# Patient Record
Sex: FEMALE | Race: BLACK OR AFRICAN AMERICAN | NOT HISPANIC OR LATINO
[De-identification: names, ages, dates, MRNs, and addresses within clinical notes are randomized per-mention and may not be internally consistent; named-entity substitution may affect disease eponyms.]

---

## 2017-04-05 ENCOUNTER — CHARTING TRANS (OUTPATIENT)
Dept: OTHER | Age: 67
End: 2017-04-05

## 2017-04-05 ENCOUNTER — LAB SERVICES (OUTPATIENT)
Dept: OTHER | Age: 67
End: 2017-04-05

## 2017-04-06 ENCOUNTER — CHARTING TRANS (OUTPATIENT)
Dept: OTHER | Age: 67
End: 2017-04-06

## 2017-04-06 LAB
ALBUMIN SERPL-MCNC: 3.9 G/DL (ref 3.6–5.1)
ALBUMIN/GLOB SERPL: 1.1 (ref 1–2.4)
ALP SERPL-CCNC: 128 UNITS/L (ref 45–117)
ALT SERPL-CCNC: 16 UNITS/L
ANION GAP SERPL CALC-SCNC: 8 MMOL/L (ref 10–20)
AST SERPL-CCNC: 20 UNITS/L
BASOPHILS # BLD: 0 K/MCL (ref 0–0.3)
BASOPHILS NFR BLD: 0 %
BILIRUB SERPL-MCNC: 0.4 MG/DL (ref 0.2–1)
BUN SERPL-MCNC: 14 MG/DL (ref 6–20)
BUN/CREAT SERPL: 18 (ref 7–25)
CALCIUM SERPL-MCNC: 8.9 MG/DL (ref 8.4–10.2)
CHLORIDE SERPL-SCNC: 103 MMOL/L (ref 98–107)
CHOLEST SERPL-MCNC: 144 MG/DL
CHOLEST/HDLC SERPL: 3.1
CO2 SERPL-SCNC: 35 MMOL/L (ref 21–32)
CREAT SERPL-MCNC: 0.76 MG/DL (ref 0.51–0.95)
CREATININE RANDOM URINE: 237 MG/DL
DIFFERENTIAL METHOD BLD: ABNORMAL
EOSINOPHIL # BLD: 0.2 K/MCL (ref 0.1–0.5)
EOSINOPHIL NFR BLD: 3 %
ERYTHROCYTE [DISTWIDTH] IN BLOOD: 14.1 % (ref 11–15)
GLOBULIN SER-MCNC: 3.5 G/DL (ref 2–4)
GLUCOSE SERPL-MCNC: 109 MG/DL (ref 65–99)
HBA1C MFR BLD: 9.6 % (ref 4.5–5.6)
HDLC SERPL-MCNC: 47 MG/DL
HEMATOCRIT: 44.5 % (ref 36–46.5)
HEMOGLOBIN: 14 G/DL (ref 12–15.5)
LDLC SERPL CALC-MCNC: 81 MG/DL
LENGTH OF FAST TIME PATIENT: ABNORMAL HRS
LENGTH OF FAST TIME PATIENT: ABNORMAL HRS
LYMPHOCYTES # BLD: 3.2 K/MCL (ref 1–4)
LYMPHOCYTES NFR BLD: 45 %
MEAN CORPUSCULAR HEMOGLOBIN: 28.6 PG (ref 26–34)
MEAN CORPUSCULAR HGB CONC: 31.5 G/DL (ref 32–36.5)
MEAN CORPUSCULAR VOLUME: 91 FL (ref 78–100)
MICROALBUMIN UR-MCNC: 10.8 MG/DL
MICROALBUMIN/CREAT UR: 45.6 MCG/MG
MONOCYTES # BLD: 0.5 K/MCL (ref 0.3–0.9)
MONOCYTES NFR BLD: 6 %
NEUTROPHILS # BLD: 3.2 K/MCL (ref 1.8–7.7)
NEUTROPHILS NFR BLD: 46 %
NONHDLC SERPL-MCNC: 97 MG/DL
PLATELET COUNT: 276 K/MCL (ref 140–450)
POTASSIUM SERPL-SCNC: 3.5 MMOL/L (ref 3.4–5.1)
RED CELL COUNT: 4.89 MIL/MCL (ref 4–5.2)
SODIUM SERPL-SCNC: 142 MMOL/L (ref 135–145)
TOTAL PROTEIN: 7.4 G/DL (ref 6.4–8.2)
TRIGL SERPL-MCNC: 79 MG/DL
WHITE BLOOD COUNT: 7.1 K/MCL (ref 4.2–11)

## 2017-06-22 ENCOUNTER — CHARTING TRANS (OUTPATIENT)
Dept: OTHER | Age: 67
End: 2017-06-22

## 2017-08-30 ENCOUNTER — CHARTING TRANS (OUTPATIENT)
Dept: OTHER | Age: 67
End: 2017-08-30

## 2018-02-05 ENCOUNTER — CHARTING TRANS (OUTPATIENT)
Dept: OTHER | Age: 68
End: 2018-02-05

## 2018-03-16 ENCOUNTER — HOSPITAL (OUTPATIENT)
Dept: OTHER | Age: 68
End: 2018-03-16
Attending: INTERNAL MEDICINE

## 2018-03-16 LAB — GLUCOSE BLDC GLUCOMTR-MCNC: 175 MG/DL (ref 65–99)

## 2018-08-04 ENCOUNTER — LAB SERVICES (OUTPATIENT)
Dept: OTHER | Age: 68
End: 2018-08-04

## 2018-08-06 ENCOUNTER — CHARTING TRANS (OUTPATIENT)
Dept: OTHER | Age: 68
End: 2018-08-06

## 2018-08-06 LAB
ALBUMIN SERPL-MCNC: 3.8 G/DL (ref 3.6–5.1)
ALBUMIN/GLOB SERPL: 1.1 (ref 1–2.4)
ALP SERPL-CCNC: 111 UNITS/L (ref 45–117)
ALT SERPL-CCNC: 19 UNITS/L
ANION GAP SERPL CALC-SCNC: 12 MMOL/L (ref 10–20)
AST SERPL-CCNC: 16 UNITS/L
BASOPHILS # BLD: 0 K/MCL (ref 0–0.3)
BASOPHILS NFR BLD: 1 %
BILIRUB SERPL-MCNC: 0.4 MG/DL (ref 0.2–1)
BUN SERPL-MCNC: 17 MG/DL (ref 6–20)
BUN/CREAT SERPL: 20 (ref 7–25)
CALCIUM SERPL-MCNC: 9.2 MG/DL (ref 8.4–10.2)
CHLORIDE SERPL-SCNC: 105 MMOL/L (ref 98–107)
CHOLEST SERPL-MCNC: 128 MG/DL
CHOLEST/HDLC SERPL: 2.8
CO2 SERPL-SCNC: 30 MMOL/L (ref 21–32)
CREAT SERPL-MCNC: 0.87 MG/DL (ref 0.51–0.95)
CREATININE RANDOM URINE: 371 MG/DL
DIFFERENTIAL METHOD BLD: ABNORMAL
EOSINOPHIL # BLD: 0.1 K/MCL (ref 0.1–0.5)
EOSINOPHIL NFR BLD: 2 %
ERYTHROCYTE [DISTWIDTH] IN BLOOD: 13.3 % (ref 11–15)
GLOBULIN SER-MCNC: 3.4 G/DL (ref 2–4)
GLUCOSE SERPL-MCNC: 190 MG/DL (ref 65–99)
HBA1C MFR BLD: 10.5 % (ref 4.5–5.6)
HDLC SERPL-MCNC: 45 MG/DL
HEMATOCRIT: 41.1 % (ref 36–46.5)
HEMOGLOBIN: 13.2 G/DL (ref 12–15.5)
IMM GRANULOCYTES # BLD AUTO: 0 K/MCL (ref 0–0.2)
IMM GRANULOCYTES NFR BLD: 0 %
LDLC SERPL CALC-MCNC: 72 MG/DL
LENGTH OF FAST TIME PATIENT: 0 HRS
LENGTH OF FAST TIME PATIENT: 0 HRS
LYMPHOCYTES # BLD: 2 K/MCL (ref 1–4)
LYMPHOCYTES NFR BLD: 37 %
MEAN CORPUSCULAR HEMOGLOBIN: 28.8 PG (ref 26–34)
MEAN CORPUSCULAR HGB CONC: 32.1 G/DL (ref 32–36.5)
MEAN CORPUSCULAR VOLUME: 89.7 FL (ref 78–100)
MICROALBUMIN UR-MCNC: 47.6 MG/DL
MICROALBUMIN/CREAT UR: 128.3 MG/G
MONOCYTES # BLD: 0.5 K/MCL (ref 0.3–0.9)
MONOCYTES NFR BLD: 9 %
NEUTROPHILS # BLD: 2.8 K/MCL (ref 1.8–7.7)
NEUTROPHILS NFR BLD: 51 %
NONHDLC SERPL-MCNC: 83 MG/DL
NRBC (NRBCRE): 0 /100 WBC
PLATELET COUNT: 215 K/MCL (ref 140–450)
POTASSIUM SERPL-SCNC: 4 MMOL/L (ref 3.4–5.1)
RED CELL COUNT: 4.58 MIL/MCL (ref 4–5.2)
SODIUM SERPL-SCNC: 143 MMOL/L (ref 135–145)
TOTAL PROTEIN: 7.2 G/DL (ref 6.4–8.2)
TRIGL SERPL-MCNC: 57 MG/DL
WHITE BLOOD COUNT: 5.5 K/MCL (ref 4.2–11)

## 2018-08-07 ENCOUNTER — CHARTING TRANS (OUTPATIENT)
Dept: OTHER | Age: 68
End: 2018-08-07

## 2018-10-31 VITALS — BODY MASS INDEX: 36.58 KG/M2 | RESPIRATION RATE: 18 BRPM | HEART RATE: 81 BPM | WEIGHT: 247 LBS | HEIGHT: 69 IN

## 2018-11-01 VITALS
HEIGHT: 69 IN | TEMPERATURE: 98.1 F | BODY MASS INDEX: 37.92 KG/M2 | RESPIRATION RATE: 18 BRPM | WEIGHT: 256 LBS | HEART RATE: 70 BPM

## 2018-11-03 VITALS
HEART RATE: 68 BPM | BODY MASS INDEX: 37.62 KG/M2 | WEIGHT: 254 LBS | SYSTOLIC BLOOD PRESSURE: 124 MMHG | HEIGHT: 69 IN | DIASTOLIC BLOOD PRESSURE: 68 MMHG

## 2018-11-03 VITALS
TEMPERATURE: 98.3 F | BODY MASS INDEX: 37.18 KG/M2 | HEIGHT: 69 IN | RESPIRATION RATE: 18 BRPM | WEIGHT: 251.02 LBS | HEART RATE: 62 BPM

## 2018-11-03 VITALS
WEIGHT: 250.99 LBS | HEART RATE: 56 BPM | RESPIRATION RATE: 18 BRPM | BODY MASS INDEX: 37.18 KG/M2 | HEIGHT: 69 IN | TEMPERATURE: 98.3 F

## 2018-11-23 ENCOUNTER — CHARTING TRANS (OUTPATIENT)
Dept: OTHER | Age: 68
End: 2018-11-23

## 2018-12-02 ENCOUNTER — TELEPHONE (OUTPATIENT)
Dept: SCHEDULING | Age: 68
End: 2018-12-02

## 2018-12-04 ENCOUNTER — APPOINTMENT (OUTPATIENT)
Dept: INTERNAL MEDICINE | Age: 68
End: 2018-12-04

## 2018-12-17 ENCOUNTER — OFFICE VISIT (OUTPATIENT)
Dept: INTERNAL MEDICINE | Age: 68
End: 2018-12-17

## 2018-12-17 VITALS
SYSTOLIC BLOOD PRESSURE: 144 MMHG | BODY MASS INDEX: 37.03 KG/M2 | WEIGHT: 250 LBS | DIASTOLIC BLOOD PRESSURE: 68 MMHG | HEIGHT: 69 IN | HEART RATE: 76 BPM | RESPIRATION RATE: 18 BRPM

## 2018-12-17 DIAGNOSIS — Z86.010 HX OF ADENOMATOUS COLONIC POLYPS: ICD-10-CM

## 2018-12-17 PROBLEM — E66.9 OBESITY (BMI 30-39.9): Status: ACTIVE | Noted: 2018-12-17

## 2018-12-17 PROCEDURE — 99214 OFFICE O/P EST MOD 30 MIN: CPT | Performed by: INTERNAL MEDICINE

## 2018-12-17 RX ORDER — FUROSEMIDE 40 MG/1
TABLET ORAL EVERY 12 HOURS SCHEDULED
COMMUNITY
Start: 2018-07-02 | End: 2019-11-13 | Stop reason: SDUPTHER

## 2018-12-17 RX ORDER — LISINOPRIL 40 MG/1
TABLET ORAL
COMMUNITY
Start: 2018-10-09 | End: 2019-04-23 | Stop reason: SDUPTHER

## 2018-12-17 RX ORDER — CARVEDILOL 25 MG/1
TABLET ORAL
COMMUNITY
Start: 2018-07-05 | End: 2019-11-13 | Stop reason: SDUPTHER

## 2018-12-17 RX ORDER — LANCETS
EACH MISCELLANEOUS
COMMUNITY
Start: 2018-03-29 | End: 2021-04-24

## 2018-12-17 RX ORDER — GLIMEPIRIDE 2 MG/1
TABLET ORAL EVERY 12 HOURS
COMMUNITY
Start: 2018-07-02 | End: 2021-05-04 | Stop reason: CLARIF

## 2018-12-17 RX ORDER — DOCUSATE SODIUM 100 MG/1
CAPSULE, LIQUID FILLED ORAL
COMMUNITY
Start: 2018-08-07 | End: 2019-08-07

## 2018-12-17 RX ORDER — SPIRONOLACTONE 50 MG/1
TABLET, FILM COATED ORAL DAILY
COMMUNITY
Start: 2018-10-25 | End: 2019-01-18 | Stop reason: SDUPTHER

## 2018-12-17 RX ORDER — FELODIPINE 10 MG/1
TABLET, EXTENDED RELEASE ORAL DAILY
COMMUNITY
Start: 2018-07-02 | End: 2019-11-13 | Stop reason: SDUPTHER

## 2018-12-17 RX ORDER — ROSUVASTATIN CALCIUM 40 MG/1
TABLET, COATED ORAL
COMMUNITY
Start: 2018-03-25 | End: 2020-10-31 | Stop reason: CLARIF

## 2018-12-17 SDOH — HEALTH STABILITY: MENTAL HEALTH: HOW OFTEN DO YOU HAVE A DRINK CONTAINING ALCOHOL?: NEVER

## 2018-12-17 SDOH — HEALTH STABILITY: PHYSICAL HEALTH: ON AVERAGE, HOW MANY DAYS PER WEEK DO YOU ENGAGE IN MODERATE TO STRENUOUS EXERCISE (LIKE A BRISK WALK)?: 0 DAYS

## 2018-12-17 ASSESSMENT — ENCOUNTER SYMPTOMS
ABDOMINAL PAIN: 0
POLYDIPSIA: 0
WHEEZING: 0
COUGH: 0
ACTIVITY CHANGE: 0
ADENOPATHY: 0
CONFUSION: 0
TROUBLE SWALLOWING: 0
SHORTNESS OF BREATH: 0
ROS SKIN COMMENTS: NO UNUSUAL MOLE OR SKIN LESION
CHEST TIGHTNESS: 0
ROS GI COMMENTS: NO CHANGE IN BOWEL HABITS
APNEA: 0
BRUISES/BLEEDS EASILY: 0
DIZZINESS: 0
NERVOUS/ANXIOUS: 0
RHINORRHEA: 0
CHOKING: 0
SLEEP DISTURBANCE: 0
HEADACHES: 0

## 2018-12-17 ASSESSMENT — PATIENT HEALTH QUESTIONNAIRE - PHQ9
2. FEELING DOWN, DEPRESSED OR HOPELESS: NOT AT ALL
1. LITTLE INTEREST OR PLEASURE IN DOING THINGS: NOT AT ALL
SUM OF ALL RESPONSES TO PHQ9 QUESTIONS 1 AND 2: 0

## 2019-01-01 ENCOUNTER — EXTERNAL RECORD (OUTPATIENT)
Dept: HEALTH INFORMATION MANAGEMENT | Facility: OTHER | Age: 69
End: 2019-01-01

## 2019-01-22 RX ORDER — SPIRONOLACTONE 50 MG/1
TABLET, FILM COATED ORAL
Qty: 90 TABLET | Refills: 0 | Status: SHIPPED | OUTPATIENT
Start: 2019-01-22 | End: 2019-10-04 | Stop reason: SDUPTHER

## 2019-03-18 ENCOUNTER — TELEPHONE (OUTPATIENT)
Dept: SCHEDULING | Age: 69
End: 2019-03-18

## 2019-03-18 ENCOUNTER — OFFICE VISIT (OUTPATIENT)
Dept: INTERNAL MEDICINE | Age: 69
End: 2019-03-18

## 2019-03-18 VITALS
WEIGHT: 249.7 LBS | SYSTOLIC BLOOD PRESSURE: 187 MMHG | DIASTOLIC BLOOD PRESSURE: 98 MMHG | BODY MASS INDEX: 36.87 KG/M2 | HEART RATE: 76 BPM | TEMPERATURE: 98.2 F

## 2019-03-18 DIAGNOSIS — G47.33 OBSTRUCTIVE SLEEP APNEA: ICD-10-CM

## 2019-03-18 DIAGNOSIS — E78.5 DYSLIPIDEMIA: ICD-10-CM

## 2019-03-18 DIAGNOSIS — I10 BENIGN ESSENTIAL HYPERTENSION: ICD-10-CM

## 2019-03-18 DIAGNOSIS — F43.24 ADJUSTMENT DISORDER WITH DISTURBANCE OF CONDUCT: Primary | ICD-10-CM

## 2019-03-18 DIAGNOSIS — K92.1 HEMATOCHEZIA: ICD-10-CM

## 2019-03-18 DIAGNOSIS — E66.09 EXOGENOUS OBESITY: ICD-10-CM

## 2019-03-18 PROCEDURE — 99214 OFFICE O/P EST MOD 30 MIN: CPT | Performed by: INTERNAL MEDICINE

## 2019-03-18 SDOH — HEALTH STABILITY: MENTAL HEALTH: HOW OFTEN DO YOU HAVE A DRINK CONTAINING ALCOHOL?: NEVER

## 2019-03-18 ASSESSMENT — PATIENT HEALTH QUESTIONNAIRE - PHQ9
SUM OF ALL RESPONSES TO PHQ9 QUESTIONS 1 AND 2: 0
2. FEELING DOWN, DEPRESSED OR HOPELESS: NOT AT ALL
SUM OF ALL RESPONSES TO PHQ9 QUESTIONS 1 AND 2: 0
1. LITTLE INTEREST OR PLEASURE IN DOING THINGS: NOT AT ALL

## 2019-03-18 ASSESSMENT — ENCOUNTER SYMPTOMS
NEUROLOGICAL NEGATIVE: 1
HEMATOLOGIC/LYMPHATIC NEGATIVE: 1

## 2019-03-25 ENCOUNTER — APPOINTMENT (OUTPATIENT)
Dept: INTERNAL MEDICINE | Age: 69
End: 2019-03-25

## 2019-04-05 ENCOUNTER — LAB SERVICES (OUTPATIENT)
Dept: LAB | Age: 69
End: 2019-04-05

## 2019-04-05 LAB
ALBUMIN SERPL-MCNC: 4 G/DL (ref 3.6–5.1)
ALBUMIN/GLOB SERPL: 1.1 {RATIO} (ref 1–2.4)
ALP SERPL-CCNC: 127 UNITS/L (ref 45–117)
ALT SERPL-CCNC: 21 UNITS/L
ANION GAP SERPL CALC-SCNC: 10 MMOL/L (ref 10–20)
AST SERPL-CCNC: 15 UNITS/L
BASOPHILS # BLD AUTO: 0.1 K/MCL (ref 0–0.3)
BASOPHILS NFR BLD AUTO: 1 %
BILIRUB SERPL-MCNC: 0.4 MG/DL (ref 0.2–1)
BUN SERPL-MCNC: 18 MG/DL (ref 6–20)
BUN/CREAT SERPL: 19 (ref 7–25)
CALCIUM SERPL-MCNC: 9 MG/DL (ref 8.4–10.2)
CHLORIDE SERPL-SCNC: 103 MMOL/L (ref 98–107)
CHOLEST SERPL-MCNC: 137 MG/DL
CHOLEST/HDLC SERPL: 2.9 {RATIO}
CO2 SERPL-SCNC: 32 MMOL/L (ref 21–32)
CREAT SERPL-MCNC: 0.93 MG/DL (ref 0.51–0.95)
DIFFERENTIAL METHOD BLD: ABNORMAL
EOSINOPHIL # BLD AUTO: 0.2 K/MCL (ref 0.1–0.5)
EOSINOPHIL NFR SPEC: 3 %
ERYTHROCYTE [DISTWIDTH] IN BLOOD: 13.3 % (ref 11–15)
FASTING STATUS PATIENT QL REPORTED: ABNORMAL HRS
GLOBULIN SER-MCNC: 3.7 G/DL (ref 2–4)
GLUCOSE SERPL-MCNC: 178 MG/DL (ref 65–99)
HCT VFR BLD CALC: 44.1 % (ref 36–46.5)
HDLC SERPL-MCNC: 47 MG/DL
HGB BLD-MCNC: 13.9 G/DL (ref 12–15.5)
IMM GRANULOCYTES # BLD AUTO: 0 K/MCL (ref 0–0.2)
IMM GRANULOCYTES NFR BLD: 0 %
LDLC SERPL-MCNC: 75 MG/DL
LENGTH OF FAST TIME PATIENT: ABNORMAL HRS
LYMPHOCYTES # BLD MANUAL: 2.2 K/MCL (ref 1–4)
LYMPHOCYTES NFR BLD MANUAL: 33 %
MCH RBC QN AUTO: 28.7 PG (ref 26–34)
MCHC RBC AUTO-ENTMCNC: 31.5 G/DL (ref 32–36.5)
MCV RBC AUTO: 91.1 FL (ref 78–100)
MONOCYTES # BLD MANUAL: 0.4 K/MCL (ref 0.3–0.9)
MONOCYTES NFR BLD MANUAL: 6 %
NEUTROPHILS # BLD: 3.9 K/MCL (ref 1.8–7.7)
NEUTROPHILS NFR BLD AUTO: 57 %
NONHDLC SERPL-MCNC: 90 MG/DL
NRBC BLD MANUAL-RTO: 0 /100 WBC
PLATELET # BLD: 262 K/MCL (ref 140–450)
POTASSIUM SERPL-SCNC: 3.9 MMOL/L (ref 3.4–5.1)
PROT SERPL-MCNC: 7.7 G/DL (ref 6.4–8.2)
RBC # BLD: 4.84 MIL/MCL (ref 4–5.2)
SODIUM SERPL-SCNC: 141 MMOL/L (ref 135–145)
TRIGL SERPL-MCNC: 75 MG/DL
WBC # BLD: 6.8 K/MCL (ref 4.2–11)

## 2019-04-05 PROCEDURE — 80053 COMPREHEN METABOLIC PANEL: CPT | Performed by: INTERNAL MEDICINE

## 2019-04-05 PROCEDURE — 82043 UR ALBUMIN QUANTITATIVE: CPT | Performed by: INTERNAL MEDICINE

## 2019-04-05 PROCEDURE — 83036 HEMOGLOBIN GLYCOSYLATED A1C: CPT | Performed by: INTERNAL MEDICINE

## 2019-04-05 PROCEDURE — 36415 COLL VENOUS BLD VENIPUNCTURE: CPT

## 2019-04-05 PROCEDURE — 85025 COMPLETE CBC W/AUTO DIFF WBC: CPT | Performed by: INTERNAL MEDICINE

## 2019-04-05 PROCEDURE — 80061 LIPID PANEL: CPT | Performed by: INTERNAL MEDICINE

## 2019-04-06 LAB
CREAT UR-MCNC: 398 MG/DL
HBA1C MFR BLD: 10 % (ref 4.5–5.6)
MICROALBUMIN UR-MCNC: 45.8 MG/DL
MICROALBUMIN/CREAT UR: 115.1 MG/G

## 2019-04-08 ENCOUNTER — OFFICE VISIT (OUTPATIENT)
Dept: INTERNAL MEDICINE | Age: 69
End: 2019-04-08

## 2019-04-08 VITALS
RESPIRATION RATE: 18 BRPM | SYSTOLIC BLOOD PRESSURE: 124 MMHG | HEIGHT: 69 IN | DIASTOLIC BLOOD PRESSURE: 79 MMHG | BODY MASS INDEX: 35.83 KG/M2 | WEIGHT: 241.9 LBS | HEART RATE: 79 BPM

## 2019-04-08 PROCEDURE — 99213 OFFICE O/P EST LOW 20 MIN: CPT | Performed by: INTERNAL MEDICINE

## 2019-04-12 ENCOUNTER — TELEPHONE (OUTPATIENT)
Dept: SCHEDULING | Age: 69
End: 2019-04-12

## 2019-04-12 RX ORDER — ROSUVASTATIN CALCIUM 40 MG/1
40 TABLET, COATED ORAL DAILY
Qty: 30 TABLET | Refills: 3 | Status: SHIPPED | OUTPATIENT
Start: 2019-04-12 | End: 2020-08-03

## 2019-04-12 RX ORDER — ROSUVASTATIN CALCIUM 40 MG/1
40 TABLET, COATED ORAL DAILY
COMMUNITY
End: 2019-04-12 | Stop reason: SDUPTHER

## 2019-04-19 ENCOUNTER — HOSPITAL (OUTPATIENT)
Dept: OTHER | Age: 69
End: 2019-04-19

## 2019-04-19 ENCOUNTER — HOSPITAL (OUTPATIENT)
Dept: OTHER | Age: 69
End: 2019-04-19
Attending: INTERNAL MEDICINE

## 2019-04-19 LAB
GLUCOSE BLDC GLUCOMTR-MCNC: 158 MG/DL (ref 65–99)
GLUCOSE BLDC GLUCOMTR-MCNC: 158 MG/DL (ref 65–99)

## 2019-04-20 ENCOUNTER — TELEPHONE (OUTPATIENT)
Dept: SCHEDULING | Age: 69
End: 2019-04-20

## 2019-04-22 ENCOUNTER — TELEPHONE (OUTPATIENT)
Dept: SCHEDULING | Age: 69
End: 2019-04-22

## 2019-04-23 ENCOUNTER — TELEPHONE (OUTPATIENT)
Dept: SCHEDULING | Age: 69
End: 2019-04-23

## 2019-04-23 LAB — PATHOLOGIST NAME: NORMAL

## 2019-04-23 RX ORDER — LISINOPRIL 40 MG/1
40 TABLET ORAL DAILY
Qty: 90 TABLET | Refills: 1 | Status: SHIPPED | OUTPATIENT
Start: 2019-04-23 | End: 2020-10-31 | Stop reason: SDUPTHER

## 2019-04-23 RX ORDER — LISINOPRIL 40 MG/1
TABLET ORAL
OUTPATIENT
Start: 2019-04-23 | End: 2020-04-22

## 2019-05-03 ENCOUNTER — EXTERNAL RECORD (OUTPATIENT)
Dept: HEALTH INFORMATION MANAGEMENT | Facility: OTHER | Age: 69
End: 2019-05-03

## 2019-05-30 ENCOUNTER — TELEPHONE (OUTPATIENT)
Dept: SCHEDULING | Age: 69
End: 2019-05-30

## 2019-06-18 ENCOUNTER — APPOINTMENT (OUTPATIENT)
Dept: INTERNAL MEDICINE | Age: 69
End: 2019-06-18

## 2019-07-18 ENCOUNTER — TELEPHONE (OUTPATIENT)
Dept: SCHEDULING | Age: 69
End: 2019-07-18

## 2019-07-24 ENCOUNTER — APPOINTMENT (OUTPATIENT)
Dept: INTERNAL MEDICINE | Age: 69
End: 2019-07-24

## 2019-08-05 ENCOUNTER — TELEPHONE (OUTPATIENT)
Dept: SCHEDULING | Age: 69
End: 2019-08-05

## 2019-08-08 RX ORDER — CARVEDILOL 25 MG/1
25 TABLET ORAL 2 TIMES DAILY WITH MEALS
Qty: 180 TABLET | Refills: 3 | Status: SHIPPED | OUTPATIENT
Start: 2019-08-08 | End: 2020-10-31 | Stop reason: SDUPTHER

## 2019-08-08 RX ORDER — CARVEDILOL 25 MG/1
25 TABLET ORAL 2 TIMES DAILY WITH MEALS
Qty: 180 TABLET | Refills: 3 | Status: CANCELLED | OUTPATIENT
Start: 2019-08-08

## 2019-08-08 RX ORDER — GLIMEPIRIDE 2 MG/1
2 TABLET ORAL 2 TIMES DAILY
Qty: 180 TABLET | Refills: 3 | Status: SHIPPED | OUTPATIENT
Start: 2019-08-08 | End: 2020-11-02

## 2019-08-08 RX ORDER — FELODIPINE 10 MG/1
10 TABLET, EXTENDED RELEASE ORAL DAILY
Qty: 90 TABLET | Refills: 3 | Status: SHIPPED | OUTPATIENT
Start: 2019-08-08 | End: 2020-10-31 | Stop reason: SDUPTHER

## 2019-08-20 ENCOUNTER — TELEPHONE (OUTPATIENT)
Dept: SCHEDULING | Age: 69
End: 2019-08-20

## 2019-08-23 ENCOUNTER — APPOINTMENT (OUTPATIENT)
Dept: INTERNAL MEDICINE | Age: 69
End: 2019-08-23

## 2019-09-27 ENCOUNTER — TELEPHONE (OUTPATIENT)
Dept: INTERNAL MEDICINE | Age: 69
End: 2019-09-27

## 2019-10-08 RX ORDER — SPIRONOLACTONE 50 MG/1
TABLET, FILM COATED ORAL
Qty: 5 TABLET | Refills: 0 | Status: SHIPPED | OUTPATIENT
Start: 2019-10-08 | End: 2019-11-13 | Stop reason: SDUPTHER

## 2019-10-09 RX ORDER — SPIRONOLACTONE 50 MG/1
TABLET, FILM COATED ORAL
Qty: 5 TABLET | Refills: 0 | OUTPATIENT
Start: 2019-10-09

## 2019-10-16 ENCOUNTER — TELEPHONE (OUTPATIENT)
Dept: SCHEDULING | Age: 69
End: 2019-10-16

## 2019-10-17 RX ORDER — FUROSEMIDE 40 MG/1
40 TABLET ORAL 2 TIMES DAILY
Qty: 180 TABLET | Refills: 1 | Status: SHIPPED | OUTPATIENT
Start: 2019-10-17 | End: 2020-04-30 | Stop reason: SDUPTHER

## 2019-10-30 ENCOUNTER — TELEPHONE (OUTPATIENT)
Dept: SCHEDULING | Age: 69
End: 2019-10-30

## 2019-11-12 ENCOUNTER — TELEPHONE (OUTPATIENT)
Dept: SCHEDULING | Age: 69
End: 2019-11-12

## 2019-11-13 ENCOUNTER — OFFICE VISIT (OUTPATIENT)
Dept: INTERNAL MEDICINE | Age: 69
End: 2019-11-13

## 2019-11-13 ENCOUNTER — LAB SERVICES (OUTPATIENT)
Dept: LAB | Age: 69
End: 2019-11-13

## 2019-11-13 VITALS
WEIGHT: 244 LBS | HEART RATE: 72 BPM | SYSTOLIC BLOOD PRESSURE: 148 MMHG | TEMPERATURE: 97.7 F | DIASTOLIC BLOOD PRESSURE: 76 MMHG | BODY MASS INDEX: 36.03 KG/M2

## 2019-11-13 DIAGNOSIS — R07.89 OTHER CHEST PAIN: ICD-10-CM

## 2019-11-13 DIAGNOSIS — E78.5 DYSLIPIDEMIA: ICD-10-CM

## 2019-11-13 DIAGNOSIS — K21.00 ESOPHAGITIS, REFLUX: ICD-10-CM

## 2019-11-13 DIAGNOSIS — Z11.59 NEED FOR HEPATITIS C SCREENING TEST: ICD-10-CM

## 2019-11-13 DIAGNOSIS — I10 BENIGN ESSENTIAL HYPERTENSION: ICD-10-CM

## 2019-11-13 DIAGNOSIS — G47.33 OBSTRUCTIVE SLEEP APNEA: Primary | ICD-10-CM

## 2019-11-13 LAB
ALBUMIN SERPL-MCNC: 4 G/DL (ref 3.6–5.1)
ALBUMIN/GLOB SERPL: 1.2 {RATIO} (ref 1–2.4)
ALP SERPL-CCNC: 117 UNITS/L (ref 45–117)
ALT SERPL-CCNC: 17 UNITS/L
ANION GAP SERPL CALC-SCNC: 9 MMOL/L (ref 10–20)
AST SERPL-CCNC: 13 UNITS/L
BASOPHILS # BLD AUTO: 0 K/MCL (ref 0–0.3)
BASOPHILS NFR BLD AUTO: 1 %
BILIRUB SERPL-MCNC: 0.4 MG/DL (ref 0.2–1)
BUN SERPL-MCNC: 16 MG/DL (ref 6–20)
BUN/CREAT SERPL: 23 (ref 7–25)
CALCIUM SERPL-MCNC: 9 MG/DL (ref 8.4–10.2)
CHLORIDE SERPL-SCNC: 106 MMOL/L (ref 98–107)
CHOLEST SERPL-MCNC: 172 MG/DL
CHOLEST/HDLC SERPL: 3.1 {RATIO}
CO2 SERPL-SCNC: 32 MMOL/L (ref 21–32)
CREAT SERPL-MCNC: 0.71 MG/DL (ref 0.51–0.95)
CREAT UR-MCNC: 94 MG/DL
DIFFERENTIAL METHOD BLD: ABNORMAL
EOSINOPHIL # BLD AUTO: 0.2 K/MCL (ref 0.1–0.5)
EOSINOPHIL NFR SPEC: 2 %
ERYTHROCYTE [DISTWIDTH] IN BLOOD: 13.6 % (ref 11–15)
FASTING STATUS PATIENT QL REPORTED: ABNORMAL HRS
GLOBULIN SER-MCNC: 3.4 G/DL (ref 2–4)
GLUCOSE SERPL-MCNC: 123 MG/DL (ref 65–99)
HBA1C MFR BLD: 9.8 % (ref 4.5–5.6)
HCT VFR BLD CALC: 44.8 % (ref 36–46.5)
HCV AB SER QL: NEGATIVE
HDLC SERPL-MCNC: 55 MG/DL
HGB BLD-MCNC: 14.1 G/DL (ref 12–15.5)
IMM GRANULOCYTES # BLD AUTO: 0 K/MCL (ref 0–0.2)
IMM GRANULOCYTES NFR BLD: 0 %
LDLC SERPL-MCNC: 103 MG/DL
LENGTH OF FAST TIME PATIENT: NORMAL HRS
LYMPHOCYTES # BLD MANUAL: 2.6 K/MCL (ref 1–4)
LYMPHOCYTES NFR BLD MANUAL: 39 %
MCH RBC QN AUTO: 28.4 PG (ref 26–34)
MCHC RBC AUTO-ENTMCNC: 31.5 G/DL (ref 32–36.5)
MCV RBC AUTO: 90.1 FL (ref 78–100)
MICROALBUMIN UR-MCNC: 16.7 MG/DL
MICROALBUMIN/CREAT UR: 177.7 MG/G
MONOCYTES # BLD MANUAL: 0.5 K/MCL (ref 0.3–0.9)
MONOCYTES NFR BLD MANUAL: 7 %
NEUTROPHILS # BLD: 3.4 K/MCL (ref 1.8–7.7)
NEUTROPHILS NFR BLD AUTO: 51 %
NONHDLC SERPL-MCNC: 117 MG/DL
NRBC BLD MANUAL-RTO: 0 /100 WBC
PLATELET # BLD: 241 K/MCL (ref 140–450)
POTASSIUM SERPL-SCNC: 3.7 MMOL/L (ref 3.4–5.1)
PROT SERPL-MCNC: 7.4 G/DL (ref 6.4–8.2)
RBC # BLD: 4.97 MIL/MCL (ref 4–5.2)
SODIUM SERPL-SCNC: 143 MMOL/L (ref 135–145)
TRIGL SERPL-MCNC: 72 MG/DL
WBC # BLD: 6.7 K/MCL (ref 4.2–11)

## 2019-11-13 PROCEDURE — 83036 HEMOGLOBIN GLYCOSYLATED A1C: CPT | Performed by: INTERNAL MEDICINE

## 2019-11-13 PROCEDURE — 3078F DIAST BP <80 MM HG: CPT | Performed by: INTERNAL MEDICINE

## 2019-11-13 PROCEDURE — 3077F SYST BP >= 140 MM HG: CPT | Performed by: INTERNAL MEDICINE

## 2019-11-13 PROCEDURE — 36415 COLL VENOUS BLD VENIPUNCTURE: CPT

## 2019-11-13 PROCEDURE — 85025 COMPLETE CBC W/AUTO DIFF WBC: CPT | Performed by: INTERNAL MEDICINE

## 2019-11-13 PROCEDURE — 80061 LIPID PANEL: CPT | Performed by: INTERNAL MEDICINE

## 2019-11-13 PROCEDURE — 80053 COMPREHEN METABOLIC PANEL: CPT | Performed by: INTERNAL MEDICINE

## 2019-11-13 PROCEDURE — 93000 ELECTROCARDIOGRAM COMPLETE: CPT | Performed by: INTERNAL MEDICINE

## 2019-11-13 PROCEDURE — 82043 UR ALBUMIN QUANTITATIVE: CPT | Performed by: INTERNAL MEDICINE

## 2019-11-13 PROCEDURE — 99214 OFFICE O/P EST MOD 30 MIN: CPT | Performed by: INTERNAL MEDICINE

## 2019-11-13 PROCEDURE — 86803 HEPATITIS C AB TEST: CPT | Performed by: INTERNAL MEDICINE

## 2019-11-13 RX ORDER — LANCETS
EACH MISCELLANEOUS
COMMUNITY
Start: 2019-10-04 | End: 2019-12-31 | Stop reason: SDUPTHER

## 2019-11-13 RX ORDER — SPIRONOLACTONE 50 MG/1
50 TABLET, FILM COATED ORAL DAILY
Qty: 90 TABLET | Refills: 1 | Status: SHIPPED | OUTPATIENT
Start: 2019-11-13 | End: 2020-04-30 | Stop reason: SDUPTHER

## 2019-11-13 RX ORDER — PANTOPRAZOLE SODIUM 40 MG/1
40 TABLET, DELAYED RELEASE ORAL DAILY
Qty: 30 TABLET | Refills: 1 | Status: SHIPPED | OUTPATIENT
Start: 2019-11-13 | End: 2021-05-04 | Stop reason: CLARIF

## 2019-11-13 SDOH — HEALTH STABILITY: MENTAL HEALTH: HOW OFTEN DO YOU HAVE A DRINK CONTAINING ALCOHOL?: NEVER

## 2019-11-13 ASSESSMENT — COGNITIVE AND FUNCTIONAL STATUS - GENERAL
DO YOU HAVE SERIOUS DIFFICULTY WALKING OR CLIMBING STAIRS: YES
BECAUSE OF A PHYSICAL, MENTAL, OR EMOTIONAL CONDITION, DO YOU HAVE DIFFICULTY DOING ERRANDS ALONE: NO
BECAUSE OF A PHYSICAL, MENTAL, OR EMOTIONAL CONDITION, DO YOU HAVE SERIOUS DIFFICULTY CONCENTRATING, REMEMBERING OR MAKING DECISIONS: NO
DO YOU HAVE DIFFICULTY DRESSING OR BATHING: NO

## 2019-11-15 ENCOUNTER — TELEPHONE (OUTPATIENT)
Dept: SCHEDULING | Age: 69
End: 2019-11-15

## 2019-11-16 ENCOUNTER — TELEPHONE (OUTPATIENT)
Dept: SCHEDULING | Age: 69
End: 2019-11-16

## 2019-11-19 ENCOUNTER — TELEPHONE (OUTPATIENT)
Dept: SCHEDULING | Age: 69
End: 2019-11-19

## 2019-11-21 ENCOUNTER — HOSPITAL (OUTPATIENT)
Dept: OTHER | Age: 69
End: 2019-11-21
Attending: INTERNAL MEDICINE

## 2019-12-21 ENCOUNTER — TELEPHONE (OUTPATIENT)
Dept: SCHEDULING | Age: 69
End: 2019-12-21

## 2019-12-26 DIAGNOSIS — G47.33 OBSTRUCTIVE SLEEP APNEA: Primary | ICD-10-CM

## 2019-12-31 ENCOUNTER — TELEPHONE (OUTPATIENT)
Dept: SCHEDULING | Age: 69
End: 2019-12-31

## 2019-12-31 RX ORDER — LANCETS
EACH MISCELLANEOUS
Qty: 100 EACH | Refills: 0 | Status: SHIPPED | OUTPATIENT
Start: 2019-12-31 | End: 2020-04-24

## 2020-01-01 ENCOUNTER — EXTERNAL RECORD (OUTPATIENT)
Dept: HEALTH INFORMATION MANAGEMENT | Facility: OTHER | Age: 70
End: 2020-01-01

## 2020-02-12 ENCOUNTER — APPOINTMENT (OUTPATIENT)
Dept: INTERNAL MEDICINE | Age: 70
End: 2020-02-12

## 2020-03-21 ENCOUNTER — TELEPHONE (OUTPATIENT)
Dept: INTERNAL MEDICINE | Age: 70
End: 2020-03-21

## 2020-03-24 ENCOUNTER — APPOINTMENT (OUTPATIENT)
Dept: INTERNAL MEDICINE | Age: 70
End: 2020-03-24

## 2020-04-24 RX ORDER — LANCETS
EACH MISCELLANEOUS
Qty: 100 EACH | Refills: 0 | Status: SHIPPED | OUTPATIENT
Start: 2020-04-24 | End: 2020-08-03

## 2020-04-30 RX ORDER — SPIRONOLACTONE 50 MG/1
50 TABLET, FILM COATED ORAL DAILY
Qty: 90 TABLET | Refills: 0 | Status: SHIPPED | OUTPATIENT
Start: 2020-04-30 | End: 2020-10-31 | Stop reason: SDUPTHER

## 2020-04-30 RX ORDER — FUROSEMIDE 40 MG/1
40 TABLET ORAL 2 TIMES DAILY
Qty: 180 TABLET | Refills: 1 | Status: SHIPPED | OUTPATIENT
Start: 2020-04-30 | End: 2020-10-31 | Stop reason: SDUPTHER

## 2020-05-01 ENCOUNTER — TELEPHONE (OUTPATIENT)
Dept: INTERNAL MEDICINE | Age: 70
End: 2020-05-01

## 2020-05-01 ENCOUNTER — TELEPHONE (OUTPATIENT)
Dept: SCHEDULING | Age: 70
End: 2020-05-01

## 2020-05-13 ENCOUNTER — OFFICE VISIT (OUTPATIENT)
Dept: INTERNAL MEDICINE | Age: 70
End: 2020-05-13

## 2020-05-13 DIAGNOSIS — G47.33 OBSTRUCTIVE SLEEP APNEA: ICD-10-CM

## 2020-05-13 DIAGNOSIS — I47.29 VENTRICULAR TACHYCARDIA (PAROXYSMAL) (CMD): ICD-10-CM

## 2020-05-13 DIAGNOSIS — I10 BENIGN ESSENTIAL HYPERTENSION: ICD-10-CM

## 2020-05-13 PROCEDURE — 99214 OFFICE O/P EST MOD 30 MIN: CPT | Performed by: INTERNAL MEDICINE

## 2020-05-13 SDOH — HEALTH STABILITY: MENTAL HEALTH: HOW OFTEN DO YOU HAVE A DRINK CONTAINING ALCOHOL?: NEVER

## 2020-05-13 ASSESSMENT — PATIENT HEALTH QUESTIONNAIRE - PHQ9
2. FEELING DOWN, DEPRESSED OR HOPELESS: NOT AT ALL
SUM OF ALL RESPONSES TO PHQ9 QUESTIONS 1 AND 2: 0
SUM OF ALL RESPONSES TO PHQ9 QUESTIONS 1 AND 2: 0
1. LITTLE INTEREST OR PLEASURE IN DOING THINGS: NOT AT ALL

## 2020-06-04 ENCOUNTER — TELEPHONE (OUTPATIENT)
Dept: SCHEDULING | Age: 70
End: 2020-06-04

## 2020-06-04 ENCOUNTER — HOSPITAL ENCOUNTER (OUTPATIENT)
Facility: HOSPITAL | Age: 70
Setting detail: OBSERVATION
LOS: 1 days | Discharge: HOME | End: 2020-06-06
Attending: EMERGENCY MEDICINE | Admitting: HOSPITALIST
Payer: MEDICARE

## 2020-06-04 DIAGNOSIS — K92.1 GASTROINTESTINAL HEMORRHAGE WITH MELENA: Primary | ICD-10-CM

## 2020-06-04 DIAGNOSIS — R73.9 HYPERGLYCEMIA: ICD-10-CM

## 2020-06-04 DIAGNOSIS — K64.9 HEMORRHOIDS: ICD-10-CM

## 2020-06-04 DIAGNOSIS — K57.90 DIVERTICULOSIS: ICD-10-CM

## 2020-06-04 PROCEDURE — 99204 OFFICE O/P NEW MOD 45 MIN: CPT | Performed by: INTERNAL MEDICINE

## 2020-06-04 PROCEDURE — 99220 INITIAL OBSERVATION CARE,LEVL III: CPT | Performed by: HOSPITALIST

## 2020-06-04 RX ORDER — SODIUM PHOSPHATE, DIBASIC AND SODIUM PHOSPHATE, MONOBASIC 7; 19 G/133ML; G/133ML
1 ENEMA RECTAL ONCE AS NEEDED
Status: DISCONTINUED | OUTPATIENT
Start: 2020-06-04 | End: 2020-06-06

## 2020-06-04 RX ORDER — INSULIN ASPART 100 [IU]/ML
0.1 INJECTION, SOLUTION INTRAVENOUS; SUBCUTANEOUS ONCE
Status: COMPLETED | OUTPATIENT
Start: 2020-06-04 | End: 2020-06-04

## 2020-06-04 RX ORDER — GLIMEPIRIDE 2 MG/1
2 TABLET ORAL 2 TIMES DAILY WITH MEALS
COMMUNITY
End: 2021-12-21

## 2020-06-04 RX ORDER — POLYETHYLENE GLYCOL 3350 17 G/17G
17 POWDER, FOR SOLUTION ORAL DAILY PRN
Status: DISCONTINUED | OUTPATIENT
Start: 2020-06-04 | End: 2020-06-06

## 2020-06-04 RX ORDER — ROSUVASTATIN CALCIUM 40 MG/1
40 TABLET, COATED ORAL DAILY
COMMUNITY
End: 2022-02-01

## 2020-06-04 RX ORDER — FELODIPINE 10 MG/1
10 TABLET, EXTENDED RELEASE ORAL NIGHTLY
COMMUNITY
End: 2021-12-29

## 2020-06-04 RX ORDER — ACETAMINOPHEN 325 MG/1
650 TABLET ORAL EVERY 6 HOURS PRN
Status: DISCONTINUED | OUTPATIENT
Start: 2020-06-04 | End: 2020-06-06

## 2020-06-04 RX ORDER — FUROSEMIDE 40 MG/1
40 TABLET ORAL DAILY
COMMUNITY

## 2020-06-04 RX ORDER — ONDANSETRON 2 MG/ML
4 INJECTION INTRAMUSCULAR; INTRAVENOUS EVERY 6 HOURS PRN
Status: DISCONTINUED | OUTPATIENT
Start: 2020-06-04 | End: 2020-06-06

## 2020-06-04 RX ORDER — DEXTROSE MONOHYDRATE 25 G/50ML
50 INJECTION, SOLUTION INTRAVENOUS
Status: DISCONTINUED | OUTPATIENT
Start: 2020-06-04 | End: 2020-06-05

## 2020-06-04 RX ORDER — SPIRONOLACTONE 100 MG/1
50 TABLET, FILM COATED ORAL DAILY
COMMUNITY
End: 2021-10-19

## 2020-06-04 RX ORDER — SODIUM CHLORIDE 0.9 % (FLUSH) 0.9 %
3 SYRINGE (ML) INJECTION AS NEEDED
Status: DISCONTINUED | OUTPATIENT
Start: 2020-06-04 | End: 2020-06-06

## 2020-06-04 RX ORDER — LISINOPRIL 40 MG/1
40 TABLET ORAL DAILY
COMMUNITY
End: 2021-12-13

## 2020-06-04 RX ORDER — SODIUM CHLORIDE 9 MG/ML
INJECTION, SOLUTION INTRAVENOUS CONTINUOUS
Status: DISCONTINUED | OUTPATIENT
Start: 2020-06-04 | End: 2020-06-06

## 2020-06-04 RX ORDER — BISACODYL 10 MG
10 SUPPOSITORY, RECTAL RECTAL
Status: DISCONTINUED | OUTPATIENT
Start: 2020-06-04 | End: 2020-06-06

## 2020-06-04 RX ORDER — CARVEDILOL 25 MG/1
25 TABLET ORAL 2 TIMES DAILY WITH MEALS
COMMUNITY
End: 2022-01-20

## 2020-06-04 NOTE — ED INITIAL ASSESSMENT (HPI)
Pt ambulatory to ED with c/o rectal bleeding since yesterday. States she's been passing bright red clots every time she goes to the bathroom. Also c/o fatigue and abd fullness.

## 2020-06-04 NOTE — ED PROVIDER NOTES
Patient Seen in: [facility] 1w    History   Patient presents with:  GI Bleeding    Stated Complaint: rectal bleeding, abd pain    HPI  Patient complains of rectal bleeding , that began last night. Bloody stool with clots. .  Risk factors for GI bl Exam     ED Triage Vitals [06/04/20 0802]   /67   Pulse 84   Resp 20   Temp 97.5 °F (36.4 °C)   Temp src Temporal   SpO2 98 %   O2 Device None (Room air)       Current:/60   Pulse 78   Temp 98.5 °F (36.9 °C) (Oral)   Resp 19   Ht 175.3 cm (5' 9 components within normal limits   CBC W/ DIFFERENTIAL - Abnormal; Notable for the following components:    RBC 3.53 (*)     HGB 10.3 (*)     HCT 31.8 (*)     All other components within normal limits   RAPID SARS-COV-2 BY PCR - Normal   CBC WITH DIFFERENTI pressure.       Medications Prescribed:  Current Discharge Medication List                      Disposition and Plan     Clinical Impression:  Gastrointestinal hemorrhage with melena  (primary encounter diagnosis)  Hyperglycemia    Disposition:  Admit    Fo

## 2020-06-04 NOTE — H&P
[address] [facility] Patient Status:  Inpatient    1950 MRN [id number]   Location [facility] 1W Attending Dianna Lozano MD   Hosp Day # 0 PCP MERRY LAW     Date:  2020  Date of Admission Drug use: Never    Allergies/Medications: Allergies: No Known Allergies  carvedilol 25 MG Oral Tab, Take 25 mg by mouth 2 (two) times daily with meals. Felodipine ER 10 MG Oral Tablet 24 Hr, Take 10 mg by mouth daily.   furosemide 40 MG Oral Tab, Take 40 enlarged, symmetric, no tenderness/mass/nodules  Back: symmetric, no curvature. ROM normal. No CVA tenderness.   Pulmonary:  clear to auscultation bilaterally  Cardiovascular: S1, S2 normal, no murmur, click, rub or gallop, regular rate and rhythm  Abdomina midnight's based on the clinical documentation in H+P. Based on patients current state of illness, I anticipate that, after discharge, patient will require TBD.

## 2020-06-04 NOTE — CONSULTS
Dayan Person 98  Report of GI Consultation    Tab Jasmine Patient Status:  Inpatient    1950 MRN [id number]   Location [facility] 1W Attending Erika Solorio MD   Hosp Day # 0 YOAN TERRELL     Date of Admission:  2020 performed by Dr. Landy Maxwell MD on 8/18/2014:  · Preoperative diagnosis: Dysphagia, rectal bleeding  · Conscious sedation fentanyl 50 mcg Midazolam 10 mg given IVP  · Hiatal hernia and \"distal esophageal ring,\" biopsies taken  · Gastric antral erosions resolve spontaneously. I explained that her history of a colonoscopy examination just 1 year ago is reassuring. I offered options of conservative management, observation alone versus at least preparing for possible colonoscopy examination.   Ms. Artem Anthony (MILK OF MAGNESIA) 400 MG/5ML suspension 30 mL, 30 mL, Oral, Daily PRN  bisacodyl (DULCOLAX) rectal suppository 10 mg, 10 mg, Rectal, Daily PRN  Fleet Enema (FLEET) 7-19 GM/118ML enema 133 mL, 1 enema, Rectal, Once PRN  ondansetron HCl (ZOFRAN) injection 4 06/04/2020    CA 8.2 (L) 06/04/2020    ALB 3.1 (L) 06/04/2020    ALKPHO 105 06/04/2020    TP 6.8 06/04/2020    AST 11 (L) 06/04/2020    ALT 19 06/04/2020    BILT 0.3 06/04/2020           No results for input(s): URINE, CULTI, BLDSMR in the last 168 hours.

## 2020-06-05 ENCOUNTER — ANESTHESIA (OUTPATIENT)
Dept: ENDOSCOPY | Facility: HOSPITAL | Age: 70
End: 2020-06-05
Payer: MEDICARE

## 2020-06-05 ENCOUNTER — ANESTHESIA EVENT (OUTPATIENT)
Dept: ENDOSCOPY | Facility: HOSPITAL | Age: 70
End: 2020-06-05
Payer: MEDICARE

## 2020-06-05 PROBLEM — K92.2 GI HEMORRHAGE: Status: ACTIVE | Noted: 2020-06-05

## 2020-06-05 PROCEDURE — 0DJD8ZZ INSPECTION OF LOWER INTESTINAL TRACT, VIA NATURAL OR ARTIFICIAL OPENING ENDOSCOPIC: ICD-10-PCS | Performed by: INTERNAL MEDICINE

## 2020-06-05 PROCEDURE — 45378 DIAGNOSTIC COLONOSCOPY: CPT | Performed by: INTERNAL MEDICINE

## 2020-06-05 RX ORDER — CARVEDILOL 25 MG/1
25 TABLET ORAL 2 TIMES DAILY WITH MEALS
Status: DISCONTINUED | OUTPATIENT
Start: 2020-06-05 | End: 2020-06-06

## 2020-06-05 RX ORDER — SODIUM CHLORIDE, SODIUM LACTATE, POTASSIUM CHLORIDE, CALCIUM CHLORIDE 600; 310; 30; 20 MG/100ML; MG/100ML; MG/100ML; MG/100ML
INJECTION, SOLUTION INTRAVENOUS CONTINUOUS PRN
Status: DISCONTINUED | OUTPATIENT
Start: 2020-06-05 | End: 2020-06-05 | Stop reason: SURG

## 2020-06-05 RX ORDER — LIDOCAINE HYDROCHLORIDE 10 MG/ML
INJECTION, SOLUTION EPIDURAL; INFILTRATION; INTRACAUDAL; PERINEURAL AS NEEDED
Status: DISCONTINUED | OUTPATIENT
Start: 2020-06-05 | End: 2020-06-05 | Stop reason: SURG

## 2020-06-05 RX ORDER — SODIUM CHLORIDE, SODIUM LACTATE, POTASSIUM CHLORIDE, CALCIUM CHLORIDE 600; 310; 30; 20 MG/100ML; MG/100ML; MG/100ML; MG/100ML
INJECTION, SOLUTION INTRAVENOUS CONTINUOUS
Status: DISCONTINUED | OUTPATIENT
Start: 2020-06-05 | End: 2020-06-06

## 2020-06-05 RX ORDER — CALCIUM CARBONATE 200(500)MG
500 TABLET,CHEWABLE ORAL
Status: DISCONTINUED | OUTPATIENT
Start: 2020-06-05 | End: 2020-06-06

## 2020-06-05 RX ORDER — POTASSIUM CHLORIDE 20 MEQ/1
40 TABLET, EXTENDED RELEASE ORAL ONCE
Status: COMPLETED | OUTPATIENT
Start: 2020-06-05 | End: 2020-06-05

## 2020-06-05 RX ORDER — DEXTROSE MONOHYDRATE 25 G/50ML
50 INJECTION, SOLUTION INTRAVENOUS
Status: DISCONTINUED | OUTPATIENT
Start: 2020-06-05 | End: 2020-06-05 | Stop reason: HOSPADM

## 2020-06-05 RX ORDER — LISINOPRIL 40 MG/1
40 TABLET ORAL DAILY
Status: DISCONTINUED | OUTPATIENT
Start: 2020-06-05 | End: 2020-06-06

## 2020-06-05 RX ORDER — PANTOPRAZOLE SODIUM 40 MG/1
40 TABLET, DELAYED RELEASE ORAL
Status: DISCONTINUED | OUTPATIENT
Start: 2020-06-06 | End: 2020-06-06

## 2020-06-05 RX ORDER — ONDANSETRON 2 MG/ML
4 INJECTION INTRAMUSCULAR; INTRAVENOUS ONCE AS NEEDED
Status: DISCONTINUED | OUTPATIENT
Start: 2020-06-05 | End: 2020-06-05 | Stop reason: HOSPADM

## 2020-06-05 RX ORDER — NALOXONE HYDROCHLORIDE 0.4 MG/ML
80 INJECTION, SOLUTION INTRAMUSCULAR; INTRAVENOUS; SUBCUTANEOUS AS NEEDED
Status: DISCONTINUED | OUTPATIENT
Start: 2020-06-05 | End: 2020-06-05 | Stop reason: HOSPADM

## 2020-06-05 RX ORDER — POTASSIUM CHLORIDE 14.9 MG/ML
20 INJECTION INTRAVENOUS ONCE
Status: COMPLETED | OUTPATIENT
Start: 2020-06-05 | End: 2020-06-06

## 2020-06-05 RX ADMIN — LIDOCAINE HYDROCHLORIDE 50 MG: 10 INJECTION, SOLUTION EPIDURAL; INFILTRATION; INTRACAUDAL; PERINEURAL at 13:57:00

## 2020-06-05 RX ADMIN — SODIUM CHLORIDE, SODIUM LACTATE, POTASSIUM CHLORIDE, CALCIUM CHLORIDE: 600; 310; 30; 20 INJECTION, SOLUTION INTRAVENOUS at 14:28:00

## 2020-06-05 RX ADMIN — SODIUM CHLORIDE, SODIUM LACTATE, POTASSIUM CHLORIDE, CALCIUM CHLORIDE: 600; 310; 30; 20 INJECTION, SOLUTION INTRAVENOUS at 13:51:00

## 2020-06-05 NOTE — PLAN OF CARE
Report called to [facility]. RN,  Endorsed completion of potassium per potassium protocol upon pts return from endoscopy

## 2020-06-05 NOTE — ANESTHESIA PREPROCEDURE EVALUATION
Anesthesia PreOp Note    HPI:     Yue Cuba is a 81year old female who presents for preoperative consultation requested by: Rebecca Wesftall MD    Date of Surgery: 6/4/2020 - 6/5/2020    Procedure(s):  COLONOSCOPY  Indication: hemetochezia DO    Or  Glucose-Vitamin C (DEX-4) chewable tab 4 tablet, 4 tablet, Oral, Q15 Min PRN, Lester Basilio DO    Or  dextrose 50 % injection 50 mL, 50 mL, Intravenous, Q15 Min PRN, Lester Basilio DO    Or  glucose (DEX4) oral liquid 30 g, 30 g, Oral, Q15 Use      Smoking status: Never Smoker      Smokeless tobacco: Never Used    Substance and Sexual Activity      Alcohol use: Not Currently      Drug use: Never      Sexual activity: Not on file    Lifestyle      Physical activity:        Days per week: Not Oral   Oral   SpO2: 99%  100%    Weight:       Height:            Anesthesia Evaluation     Patient summary reviewed and Nursing notes reviewed    Airway   Mallampati: III  TM distance: >3 FB  Neck ROM: full  Dental - normal exam     Pulmonary - normal exa

## 2020-06-05 NOTE — ANESTHESIA POSTPROCEDURE EVALUATION
Patient: Waleska Vargas    Procedure Summary     Date:  06/05/20 Room / Location:  [facility] ENDOSCOPY 04 / [facility] ENDOSCOPY    Anesthesia Start:  5415 Anesthesia Stop:  9696    Procedure:  COLONOSCOPY (N/A ) Diagnosis:       Hematochezia      (diverticulosis, he

## 2020-06-05 NOTE — RESPIRATORY THERAPY NOTE
-Patient wears CPAP at home, and requested to use one during her hospital stay    -Pt.  Will be placed on AUTO 20/5 ( home settings unknown)

## 2020-06-05 NOTE — OPERATIVE REPORT
COLONOSCOPY PROCEDURE REPORT     DATE OF PROCEDURE:  6/5/2020     PCP: Georgie TERRELL     PREOPERATIVE DIAGNOSIS:  Hematochezia, acute blood loss anemia     POSTOPERATIVE DIAGNOSIS:  See impression. SURGEON:  BASILIA Hamilton

## 2020-06-05 NOTE — CM/SW NOTE
Patient failed inpatient criteria. Second level of review completed and supports observation. UR committee in agreement. Discussed with Dr. Philip Zambrano  who approves observation status. MOON given to the patient and order written.     Read notice to pt - copy on

## 2020-06-05 NOTE — DISCHARGE SUMMARY
[facility]    Discharge Summary     DOA 20  DOD 20    Eliane Guerrero Patient Status:  Observation    1950 MRN [id number]   Location [facility] ENDOSCOPY LAB SUITES Attending Karly Rothman, [facility] Day # 1 PCP suspension 30 mL, 30 mL, Oral, Daily PRN  bisacodyl (DULCOLAX) rectal suppository 10 mg, 10 mg, Rectal, Daily PRN  Fleet Enema (FLEET) 7-19 GM/118ML enema 133 mL, 1 enema, Rectal, Once PRN  ondansetron HCl (ZOFRAN) injection 4 mg, 4 mg, Intravenous, Q6H ID

## 2020-06-05 NOTE — PLAN OF CARE
Report given to [facility] , endoscopy  Aware that potassium continues to infuse,  Pt updated with POC, aware of transfer to endoscopy for colonoscopy at this time NPO manitained

## 2020-06-05 NOTE — PAYOR COMM NOTE
--------------  ADMISSION REVIEW     [employer] MEDICARE ADV PPO  Subscriber #:  [id number]  Authorization Number: [id number]    Admit date: 6/4/20  Admit time: 200       Admitting Physician: Miguel John MD  Attending Physician:  Maryln Denver, D • Cancer Father 58        COLON CANCER   • Hypertension Mother    • Other (Other) Mother         CIRCULATION PROBLEM   • Other (CHF) Other        Social History    Tobacco Use      Smoking status: Never Smoker      Smokeless tobacco: Never Used    Alcohol HEPATIC FUNCTION PANEL (7) - Abnormal; Notable for the following components:    AST 11 (*)     Albumin 3.1 (*)     All other components within normal limits   HEMOGLOBIN A1C - Abnormal; Notable for the following components:    HgbA1C 11.4 (*)     Estimated RAINBOW DRAW BLUE   RAINBOW DRAW LAVENDER   RAINBOW DRAW LIGHT GREEN   RAINBOW DRAW GOLD       MDM     @      Monitor Interpretation:  nsr 76    Radiology Interpretation:        Critical Care:        MDM       Admission disposition: 6/4/2020 10:06 AM HPI:   Song Sy is a(n) 81year old female. Patient is a 68-year-old female with a history of diabetes, hypertension, and hyperlipidemia who presents with bright red blood per rectum for the last 2 days.   Patient reports that yesterday she had a Rosuvastatin Calcium 40 MG Oral Tab, Take 40 mg by mouth daily. lisinopril 40 MG Oral Tab, Take 40 mg by mouth daily. metFORMIN HCl 500 MG Oral Tab, Take 1,000 mg by mouth 2 (two) times daily with meals.   spironolactone 100 MG Oral Tab, Take 50 mg by gordo Extremities: extremities normal, atraumatic, no cyanosis or edema  Pulses: 2+ and symmetric  Skin: Skin color, texture, turgor normal. No rashes or lesions  Neurologic: Alert and oriented X 3, normal strength and tone. Normal symmetric reflexes.  Normal  insulin aspart (NOVOLOG) 100 UNIT/ML vial 12 Units     Date Action Dose Route User    6/4/2020 0926 Given 12 Units Subcutaneous (Right Lower Abdomen) Herbert Griffin RN      Insulin Regular Human (NOVOLIN R) 100 UNIT/ML injection 1-5 Units     Date Action She describes abrupt onset about 24 hours ago, around 4 PM of painless hematochezia, large volumes. Though this has happened before, previous significant event 2014 this was the worst yet.   Some vague abdominal cramping, fullness but certainly no pain.    No record here of that cecal polyp removed.     Pathology report identified in Epic dated 4/19/2019 from [facility], Dr. Antwon Del Castillo.  MD Robbie including esophageal biopsies, gastric biopsies (H. pylori negative) hyperplastic 6/4/2020                  Past Medical History       Past Medical History:   Diagnosis Date   • Diabetes ([facility] [address]     • Diverticulitis     • Essential hypertension     • High blood pressure     • High cholesterol     • Hyperlipidemia     • Sleep apnea       Felodipine ER 10 MG Oral Tablet 24 Hr, Take 10 mg by mouth daily. furosemide 40 MG Oral Tab, Take 40 mg by mouth daily. glimepiride 2 MG Oral Tab, Take 2 mg by mouth 2 (two) times daily with meals.   Rosuvastatin Calcium 40 MG Oral Tab, Take 40 mg by mout Electronically signed by Venkat Stewart MD at 6/4/2020  4:39 PM       ED to Hosp-Admission (Current) on 6/4/2020          Detailed Report      Chart Review: Note Routing History     No routing history on file.      PLEASE FAX DAYS CERTIFIED AND N

## 2020-06-06 VITALS
HEART RATE: 70 BPM | WEIGHT: 239.69 LBS | SYSTOLIC BLOOD PRESSURE: 148 MMHG | DIASTOLIC BLOOD PRESSURE: 67 MMHG | RESPIRATION RATE: 20 BRPM | HEIGHT: 69 IN | TEMPERATURE: 98 F | BODY MASS INDEX: 35.5 KG/M2 | OXYGEN SATURATION: 99 %

## 2020-06-06 NOTE — PLAN OF CARE
Problem: Patient Centered Care  Goal: Patient preferences are identified and integrated in the patient's plan of care  Description  Interventions:  - What would you like us to know as we care for you?  Lives at home with daughter, she came living with me fever/infection during anticipated neutropenic period  Description  INTERVENTIONS  - Monitor WBC  - Administer growth factors as ordered  - Implement neutropenic guidelines  Outcome: Progressing     Problem: SAFETY ADULT - FALL  Goal: Free from fall injury trends  - Administer supportive blood products/factors, fluids and medications as ordered and appropriate  - Administer supportive blood products/factors as ordered and appropriate  Outcome: Progressing     A&Ox4, ambulates in room, patient independent   M

## 2020-06-06 NOTE — PLAN OF CARE
Problem: Patient Centered Care  Goal: Patient preferences are identified and integrated in the patient's plan of care  Description  Interventions:  - What would you like us to know as we care for you?  Lives at home with daughter, she came living with me pain and evaluate response  - Implement non-pharmacological measures as appropriate and evaluate response  - Consider cultural and social influences on pain and pain management  - Manage/alleviate anxiety  - Utilize distraction and/or relaxation techniques signs and symptoms of electrolyte imbalances  - Administer electrolyte replacement as ordered  - Monitor response to electrolyte replacements, including rhythm and repeat lab results as appropriate  - Fluid restriction as ordered  - Instruct patient on flu

## 2020-06-08 ENCOUNTER — TELEPHONE (OUTPATIENT)
Dept: SCHEDULING | Age: 70
End: 2020-06-08

## 2020-06-08 ENCOUNTER — TELEPHONE (OUTPATIENT)
Dept: MEDSURG UNIT | Facility: HOSPITAL | Age: 70
End: 2020-06-08

## 2020-06-08 ENCOUNTER — OFFICE VISIT (OUTPATIENT)
Dept: INTERNAL MEDICINE | Age: 70
End: 2020-06-08

## 2020-06-08 DIAGNOSIS — D50.0 ANEMIA DUE TO GI BLOOD LOSS: ICD-10-CM

## 2020-06-08 DIAGNOSIS — D64.9 ANEMIA, UNSPECIFIED TYPE: Primary | ICD-10-CM

## 2020-06-08 PROCEDURE — 99443 TELEPHONE E&M BY PHYSICIAN EST PT NOT ORIG PREV 7 DAYS 21-30 MIN: CPT | Performed by: INTERNAL MEDICINE

## 2020-06-08 RX ORDER — LISINOPRIL 40 MG/1
40 TABLET ORAL DAILY
Qty: 90 TABLET | Refills: 3 | Status: SHIPPED
Start: 2020-06-08 | End: 2020-06-22 | Stop reason: SDUPTHER

## 2020-06-08 ASSESSMENT — PATIENT HEALTH QUESTIONNAIRE - PHQ9
SUM OF ALL RESPONSES TO PHQ9 QUESTIONS 1 AND 2: 0
CLINICAL INTERPRETATION OF PHQ9 SCORE: NO FURTHER SCREENING NEEDED
CLINICAL INTERPRETATION OF PHQ2 SCORE: NO FURTHER SCREENING NEEDED
2. FEELING DOWN, DEPRESSED OR HOPELESS: NOT AT ALL
SUM OF ALL RESPONSES TO PHQ9 QUESTIONS 1 AND 2: 0
1. LITTLE INTEREST OR PLEASURE IN DOING THINGS: NOT AT ALL

## 2020-06-08 NOTE — PAYOR COMM NOTE
--------------  DISCHARGE REVIEW    Payor: [employer] MEDICARE ADV PPO  Subscriber #:  [id number]  Authorization Number: [id number]    Admit date: 6/4/20  Admit time:  1117  Discharge Date: 6/6/2020  3:03 PM     Admitting Physician: Miguel John MD  Attendin Dr. Stanton spoke with Dr. Thorpe after office visit. Dr. Thorpe would like to see patient in office ASAP. Will place stat referral.   tablet, 8 tablet, Oral, Q15 Min PRN  lactated ringers infusion, , Intravenous, Continuous  Atropine Sulfate 0.1 MG/ML injection 0.5 mg, 0.5 mg, Intravenous, PRN  Naloxone HCl (NARCAN) 0.4 MG/ML injection 80 mcg, 80 mcg, Intravenous, PRN  ondansetron HCl (Z Assessment and Plan:      Gastrointestinal hemorrhage with melena  - s/p c-scope with hemorrhoids. No bleeding noted.    - hb stable today   - apprec GI consult   - adv diet and possible dc later today        Hyperglycemia and DM - uncontrolled   - will

## 2020-06-20 ENCOUNTER — TELEPHONE (OUTPATIENT)
Dept: SCHEDULING | Age: 70
End: 2020-06-20

## 2020-06-20 ENCOUNTER — LAB SERVICES (OUTPATIENT)
Dept: LAB | Age: 70
End: 2020-06-20

## 2020-06-20 DIAGNOSIS — D64.9 ANEMIA, UNSPECIFIED TYPE: ICD-10-CM

## 2020-06-20 LAB
ALBUMIN SERPL-MCNC: 3.7 G/DL (ref 3.6–5.1)
ALBUMIN/GLOB SERPL: 1 {RATIO} (ref 1–2.4)
ALP SERPL-CCNC: 80 UNITS/L (ref 45–117)
ALT SERPL-CCNC: 14 UNITS/L
ANION GAP SERPL CALC-SCNC: 12 MMOL/L (ref 10–20)
AST SERPL-CCNC: 12 UNITS/L
BASOPHILS # BLD: 0 K/MCL (ref 0–0.3)
BASOPHILS NFR BLD: 1 %
BILIRUB SERPL-MCNC: 0.4 MG/DL (ref 0.2–1)
BUN SERPL-MCNC: 21 MG/DL (ref 6–20)
BUN/CREAT SERPL: 21 (ref 7–25)
CALCIUM SERPL-MCNC: 9.3 MG/DL (ref 8.4–10.2)
CHLORIDE SERPL-SCNC: 107 MMOL/L (ref 98–107)
CHOLEST SERPL-MCNC: 127 MG/DL
CHOLEST/HDLC SERPL: 2.7 {RATIO}
CO2 SERPL-SCNC: 29 MMOL/L (ref 21–32)
CREAT SERPL-MCNC: 1 MG/DL (ref 0.51–0.95)
DIFFERENTIAL METHOD BLD: ABNORMAL
EOSINOPHIL # BLD: 0.1 K/MCL (ref 0.1–0.5)
EOSINOPHIL NFR BLD: 1 %
ERYTHROCYTE [DISTWIDTH] IN BLOOD: 14.5 % (ref 11–15)
GLOBULIN SER-MCNC: 3.8 G/DL (ref 2–4)
GLUCOSE SERPL-MCNC: 109 MG/DL (ref 65–99)
HBA1C MFR BLD: 8.6 % (ref 4.5–5.6)
HCT VFR BLD CALC: 34.7 % (ref 36–46.5)
HDLC SERPL-MCNC: 47 MG/DL
HGB BLD-MCNC: 10.9 G/DL (ref 12–15.5)
IMM GRANULOCYTES # BLD AUTO: 0 K/MCL (ref 0–0.2)
IMM GRANULOCYTES NFR BLD: 0 %
LDLC SERPL CALC-MCNC: 67 MG/DL
LENGTH OF FAST TIME PATIENT: ABNORMAL HRS
LENGTH OF FAST TIME PATIENT: ABNORMAL HRS
LYMPHOCYTES # BLD: 2.2 K/MCL (ref 1–4)
LYMPHOCYTES NFR BLD: 35 %
MCH RBC QN AUTO: 28.7 PG (ref 26–34)
MCHC RBC AUTO-ENTMCNC: 31.4 G/DL (ref 32–36.5)
MCV RBC AUTO: 91.3 FL (ref 78–100)
MONOCYTES # BLD: 0.5 K/MCL (ref 0.3–0.9)
MONOCYTES NFR BLD: 7 %
NEUTROPHILS # BLD: 3.5 K/MCL (ref 1.8–7.7)
NEUTROPHILS NFR BLD: 56 %
NONHDLC SERPL-MCNC: 80 MG/DL
NRBC BLD MANUAL-RTO: 0 /100 WBC
PLATELET # BLD: 283 K/MCL (ref 140–450)
POTASSIUM SERPL-SCNC: 4.1 MMOL/L (ref 3.4–5.1)
PROT SERPL-MCNC: 7.5 G/DL (ref 6.4–8.2)
RBC # BLD: 3.8 MIL/MCL (ref 4–5.2)
SODIUM SERPL-SCNC: 144 MMOL/L (ref 135–145)
TRIGL SERPL-MCNC: 67 MG/DL
WBC # BLD: 6.3 K/MCL (ref 4.2–11)

## 2020-06-20 PROCEDURE — 80053 COMPREHEN METABOLIC PANEL: CPT | Performed by: INTERNAL MEDICINE

## 2020-06-20 PROCEDURE — 85025 COMPLETE CBC W/AUTO DIFF WBC: CPT | Performed by: INTERNAL MEDICINE

## 2020-06-20 PROCEDURE — 36415 COLL VENOUS BLD VENIPUNCTURE: CPT

## 2020-06-20 PROCEDURE — 83036 HEMOGLOBIN GLYCOSYLATED A1C: CPT | Performed by: INTERNAL MEDICINE

## 2020-06-20 PROCEDURE — 80061 LIPID PANEL: CPT | Performed by: INTERNAL MEDICINE

## 2020-06-22 DIAGNOSIS — I10 BENIGN ESSENTIAL HYPERTENSION: Primary | ICD-10-CM

## 2020-06-22 RX ORDER — LISINOPRIL 40 MG/1
TABLET ORAL
Qty: 90 TABLET | Refills: 3 | Status: SHIPPED
Start: 2020-06-22 | End: 2020-08-03

## 2020-07-07 ENCOUNTER — TELEPHONE (OUTPATIENT)
Dept: SCHEDULING | Age: 70
End: 2020-07-07

## 2020-07-17 ENCOUNTER — NURSE TRIAGE (OUTPATIENT)
Dept: TELEHEALTH | Age: 70
End: 2020-07-17

## 2020-07-17 ENCOUNTER — V-VISIT (OUTPATIENT)
Dept: INTERNAL MEDICINE | Age: 70
End: 2020-07-17

## 2020-07-17 ENCOUNTER — TELEPHONE (OUTPATIENT)
Dept: SCHEDULING | Age: 70
End: 2020-07-17

## 2020-07-17 ENCOUNTER — LAB SERVICES (OUTPATIENT)
Dept: LAB | Age: 70
End: 2020-07-17

## 2020-07-17 VITALS
TEMPERATURE: 95.7 F | WEIGHT: 220.46 LBS | HEART RATE: 64 BPM | RESPIRATION RATE: 16 BRPM | BODY MASS INDEX: 32.65 KG/M2 | DIASTOLIC BLOOD PRESSURE: 65 MMHG | SYSTOLIC BLOOD PRESSURE: 101 MMHG | HEIGHT: 69 IN

## 2020-07-17 DIAGNOSIS — E78.5 DYSLIPIDEMIA: ICD-10-CM

## 2020-07-17 DIAGNOSIS — I10 BENIGN ESSENTIAL HYPERTENSION: ICD-10-CM

## 2020-07-17 DIAGNOSIS — R53.83 FATIGUE, UNSPECIFIED TYPE: Primary | ICD-10-CM

## 2020-07-17 DIAGNOSIS — D50.0 ANEMIA DUE TO GI BLOOD LOSS: ICD-10-CM

## 2020-07-17 DIAGNOSIS — R53.83 FATIGUE, UNSPECIFIED TYPE: ICD-10-CM

## 2020-07-17 PROBLEM — R07.89 OTHER CHEST PAIN: Status: RESOLVED | Noted: 2019-11-13 | Resolved: 2020-07-17

## 2020-07-17 LAB
FASTING STATUS PATIENT QL REPORTED: NO
GLUCOSE SERPL-MCNC: 93 MG/DL (ref 65–99)

## 2020-07-17 PROCEDURE — 80050 GENERAL HEALTH PANEL: CPT | Performed by: INTERNAL MEDICINE

## 2020-07-17 PROCEDURE — 83540 ASSAY OF IRON: CPT | Performed by: INTERNAL MEDICINE

## 2020-07-17 PROCEDURE — 36415 COLL VENOUS BLD VENIPUNCTURE: CPT

## 2020-07-17 PROCEDURE — 3074F SYST BP LT 130 MM HG: CPT | Performed by: INTERNAL MEDICINE

## 2020-07-17 PROCEDURE — 83735 ASSAY OF MAGNESIUM: CPT | Performed by: INTERNAL MEDICINE

## 2020-07-17 PROCEDURE — 3078F DIAST BP <80 MM HG: CPT | Performed by: INTERNAL MEDICINE

## 2020-07-17 PROCEDURE — 82306 VITAMIN D 25 HYDROXY: CPT | Performed by: INTERNAL MEDICINE

## 2020-07-17 PROCEDURE — 99214 OFFICE O/P EST MOD 30 MIN: CPT | Performed by: INTERNAL MEDICINE

## 2020-07-17 PROCEDURE — 82962 GLUCOSE BLOOD TEST: CPT | Performed by: INTERNAL MEDICINE

## 2020-07-17 PROCEDURE — 82607 VITAMIN B-12: CPT | Performed by: INTERNAL MEDICINE

## 2020-07-17 PROCEDURE — 83550 IRON BINDING TEST: CPT | Performed by: INTERNAL MEDICINE

## 2020-07-17 SDOH — HEALTH STABILITY: MENTAL HEALTH: HOW OFTEN DO YOU HAVE A DRINK CONTAINING ALCOHOL?: NEVER

## 2020-07-17 SDOH — SOCIAL STABILITY: SOCIAL NETWORK: ARE YOU MARRIED, WIDOWED, DIVORCED, SEPARATED, NEVER MARRIED, OR LIVING WITH A PARTNER?: DIVORCED

## 2020-07-17 SDOH — HEALTH STABILITY: PHYSICAL HEALTH: ON AVERAGE, HOW MANY DAYS PER WEEK DO YOU ENGAGE IN MODERATE TO STRENUOUS EXERCISE (LIKE A BRISK WALK)?: 0 DAYS

## 2020-07-17 ASSESSMENT — ENCOUNTER SYMPTOMS
DIZZINESS: 0
ENDOCRINE NEGATIVE: 1
PSYCHIATRIC NEGATIVE: 1
CHILLS: 0
HEADACHES: 0
UNEXPECTED WEIGHT CHANGE: 1
FEVER: 0
RESPIRATORY NEGATIVE: 1
GASTROINTESTINAL NEGATIVE: 1

## 2020-07-17 ASSESSMENT — PATIENT HEALTH QUESTIONNAIRE - PHQ9
4. FEELING TIRED OR HAVING LITTLE ENERGY: NEARLY EVERY DAY
2. FEELING DOWN, DEPRESSED OR HOPELESS: NOT AT ALL
SUM OF ALL RESPONSES TO PHQ9 QUESTIONS 1 AND 2: 3
6. FEELING BAD ABOUT YOURSELF - OR THAT YOU ARE A FAILURE OR HAVE LET YOURSELF OR YOUR FAMILY DOWN: NEARLY EVERY DAY
9. THOUGHTS THAT YOU WOULD BE BETTER OFF DEAD, OR OF HURTING YOURSELF: NOT AT ALL
5. POOR APPETITE, WEIGHT LOSS, OR OVEREATING: NEARLY EVERY DAY
CLINICAL INTERPRETATION OF PHQ9 SCORE: FURTHER SCREENING NEEDED
CLINICAL INTERPRETATION OF PHQ9 SCORE: MODERATELY SEVERE DEPRESSION
SUM OF ALL RESPONSES TO PHQ QUESTIONS 1-9: 16
8. MOVING OR SPEAKING SO SLOWLY THAT OTHER PEOPLE COULD HAVE NOTICED. OR THE OPPOSITE, BEING SO FIGETY OR RESTLESS THAT YOU HAVE BEEN MOVING AROUND A LOT MORE THAN USUAL: SEVERAL DAYS
CLINICAL INTERPRETATION OF PHQ2 SCORE: MODERATELY SEVERE DEPRESSION
10. IF YOU CHECKED OFF ANY PROBLEMS, HOW DIFFICULT HAVE THESE PROBLEMS MADE IT FOR YOU TO DO YOUR WORK, TAKE CARE OF THINGS AT HOME, OR GET ALONG WITH OTHER PEOPLE: SOMEWHAT DIFFICULT
7. TROUBLE CONCENTRATING ON THINGS, SUCH AS READING THE NEWSPAPER OR WATCHING TELEVISION: NOT AT ALL
CLINICAL INTERPRETATION OF PHQ2 SCORE: FURTHER SCREENING NEEDED
SUM OF ALL RESPONSES TO PHQ9 QUESTIONS 1 TO 9: 16
3. TROUBLE FALLING OR STAYING ASLEEP OR SLEEPING TOO MUCH: NEARLY EVERY DAY
SUM OF ALL RESPONSES TO PHQ9 QUESTIONS 1 AND 2: 3
1. LITTLE INTEREST OR PLEASURE IN DOING THINGS: NEARLY EVERY DAY

## 2020-07-17 ASSESSMENT — COGNITIVE AND FUNCTIONAL STATUS - GENERAL
BECAUSE OF A PHYSICAL, MENTAL, OR EMOTIONAL CONDITION, DO YOU HAVE SERIOUS DIFFICULTY CONCENTRATING, REMEMBERING OR MAKING DECISIONS: NO
DO YOU HAVE SERIOUS DIFFICULTY WALKING OR CLIMBING STAIRS: YES
DO YOU HAVE DIFFICULTY DRESSING OR BATHING: NO
DO YOU HAVE SERIOUS DIFFICULTY WALKING OR CLIMBING STAIRS: NO
BECAUSE OF A PHYSICAL, MENTAL, OR EMOTIONAL CONDITION, DO YOU HAVE DIFFICULTY DOING ERRANDS ALONE: NO
DO YOU HAVE DIFFICULTY DRESSING OR BATHING: NO
BECAUSE OF A PHYSICAL, MENTAL, OR EMOTIONAL CONDITION, DO YOU HAVE DIFFICULTY DOING ERRANDS ALONE: YES
BECAUSE OF A PHYSICAL, MENTAL, OR EMOTIONAL CONDITION, DO YOU HAVE SERIOUS DIFFICULTY CONCENTRATING, REMEMBERING OR MAKING DECISIONS: NO

## 2020-07-18 LAB
25(OH)D3+25(OH)D2 SERPL-MCNC: 19.1 NG/ML (ref 30–100)
ALBUMIN SERPL-MCNC: 4 G/DL (ref 3.6–5.1)
ALBUMIN/GLOB SERPL: 1.1 {RATIO} (ref 1–2.4)
ALP SERPL-CCNC: 77 UNITS/L (ref 45–117)
ALT SERPL-CCNC: 13 UNITS/L
ANION GAP SERPL CALC-SCNC: 13 MMOL/L (ref 10–20)
AST SERPL-CCNC: 18 UNITS/L
BASOPHILS # BLD: 0.1 K/MCL (ref 0–0.3)
BASOPHILS NFR BLD: 1 %
BILIRUB SERPL-MCNC: 0.5 MG/DL (ref 0.2–1)
BUN SERPL-MCNC: 25 MG/DL (ref 6–20)
BUN/CREAT SERPL: 12 (ref 7–25)
CALCIUM SERPL-MCNC: 9.3 MG/DL (ref 8.4–10.2)
CHLORIDE SERPL-SCNC: 105 MMOL/L (ref 98–107)
CO2 SERPL-SCNC: 29 MMOL/L (ref 21–32)
CREAT SERPL-MCNC: 2.06 MG/DL (ref 0.51–0.95)
DIFFERENTIAL METHOD BLD: ABNORMAL
EOSINOPHIL # BLD: 0.1 K/MCL (ref 0.1–0.5)
EOSINOPHIL NFR BLD: 2 %
ERYTHROCYTE [DISTWIDTH] IN BLOOD: 13.8 % (ref 11–15)
GLOBULIN SER-MCNC: 3.8 G/DL (ref 2–4)
GLUCOSE SERPL-MCNC: 92 MG/DL (ref 65–99)
HCT VFR BLD CALC: 39.4 % (ref 36–46.5)
HGB BLD-MCNC: 12.3 G/DL (ref 12–15.5)
IMM GRANULOCYTES # BLD AUTO: 0 K/MCL (ref 0–0.2)
IMM GRANULOCYTES NFR BLD: 0 %
IRON SATN MFR SERPL: 15 % (ref 15–45)
IRON SERPL-MCNC: 52 MCG/DL (ref 50–170)
LENGTH OF FAST TIME PATIENT: ABNORMAL HRS
LYMPHOCYTES # BLD: 2.9 K/MCL (ref 1–4)
LYMPHOCYTES NFR BLD: 34 %
MAGNESIUM SERPL-MCNC: 1.9 MG/DL (ref 1.7–2.4)
MCH RBC QN AUTO: 28.3 PG (ref 26–34)
MCHC RBC AUTO-ENTMCNC: 31.2 G/DL (ref 32–36.5)
MCV RBC AUTO: 90.6 FL (ref 78–100)
MONOCYTES # BLD: 0.5 K/MCL (ref 0.3–0.9)
MONOCYTES NFR BLD: 6 %
NEUTROPHILS # BLD: 4.8 K/MCL (ref 1.8–7.7)
NEUTROPHILS NFR BLD: 57 %
NRBC BLD MANUAL-RTO: 0 /100 WBC
PLATELET # BLD: 265 K/MCL (ref 140–450)
POTASSIUM SERPL-SCNC: 3.2 MMOL/L (ref 3.4–5.1)
PROT SERPL-MCNC: 7.8 G/DL (ref 6.4–8.2)
RBC # BLD: 4.35 MIL/MCL (ref 4–5.2)
SODIUM SERPL-SCNC: 144 MMOL/L (ref 135–145)
TIBC SERPL-MCNC: 358 MCG/DL (ref 250–450)
TSH SERPL-ACNC: 1.14 MCUNITS/ML (ref 0.35–5)
VIT B12 SERPL-MCNC: 527 PG/ML (ref 211–911)
WBC # BLD: 8.4 K/MCL (ref 4.2–11)

## 2020-07-20 ENCOUNTER — E-ADVICE (OUTPATIENT)
Dept: INTERNAL MEDICINE | Age: 70
End: 2020-07-20

## 2020-07-21 ENCOUNTER — LAB SERVICES (OUTPATIENT)
Dept: INTERNAL MEDICINE | Age: 70
End: 2020-07-21

## 2020-07-21 DIAGNOSIS — R79.89 ELEVATED SERUM CREATININE: Primary | ICD-10-CM

## 2020-07-31 ENCOUNTER — TELEPHONE (OUTPATIENT)
Dept: SCHEDULING | Age: 70
End: 2020-07-31

## 2020-08-01 DIAGNOSIS — I10 BENIGN ESSENTIAL HYPERTENSION: ICD-10-CM

## 2020-08-03 PROCEDURE — 80048 BASIC METABOLIC PNL TOTAL CA: CPT | Performed by: INTERNAL MEDICINE

## 2020-08-03 RX ORDER — LISINOPRIL 40 MG/1
TABLET ORAL
Qty: 90 TABLET | Refills: 0 | Status: SHIPPED | OUTPATIENT
Start: 2020-08-03 | End: 2020-10-31 | Stop reason: CLARIF

## 2020-08-03 RX ORDER — LANCETS
EACH MISCELLANEOUS
Qty: 100 EACH | Refills: 0 | Status: SHIPPED | OUTPATIENT
Start: 2020-08-03 | End: 2020-10-29

## 2020-08-03 RX ORDER — ROSUVASTATIN CALCIUM 40 MG/1
TABLET, COATED ORAL
Qty: 90 TABLET | Refills: 0 | Status: SHIPPED | OUTPATIENT
Start: 2020-08-03 | End: 2020-10-31 | Stop reason: SDUPTHER

## 2020-08-04 ENCOUNTER — LAB SERVICES (OUTPATIENT)
Dept: LAB | Age: 70
End: 2020-08-04

## 2020-08-04 DIAGNOSIS — D50.0 ANEMIA DUE TO GI BLOOD LOSS: ICD-10-CM

## 2020-08-04 LAB
ANION GAP SERPL CALC-SCNC: 12 MMOL/L (ref 10–20)
BUN SERPL-MCNC: 24 MG/DL (ref 6–20)
BUN/CREAT SERPL: 21 (ref 7–25)
CALCIUM SERPL-MCNC: 10.1 MG/DL (ref 8.4–10.2)
CHLORIDE SERPL-SCNC: 101 MMOL/L (ref 98–107)
CO2 SERPL-SCNC: 29 MMOL/L (ref 21–32)
CREAT SERPL-MCNC: 1.17 MG/DL (ref 0.51–0.95)
GLUCOSE SERPL-MCNC: 118 MG/DL (ref 65–99)
LENGTH OF FAST TIME PATIENT: ABNORMAL HRS
POTASSIUM SERPL-SCNC: 4.3 MMOL/L (ref 3.4–5.1)
SODIUM SERPL-SCNC: 138 MMOL/L (ref 135–145)

## 2020-08-04 PROCEDURE — 36415 COLL VENOUS BLD VENIPUNCTURE: CPT

## 2020-09-01 ENCOUNTER — MED REC SCAN ONLY (OUTPATIENT)
Dept: GASTROENTEROLOGY | Facility: CLINIC | Age: 70
End: 2020-09-01

## 2020-09-15 ENCOUNTER — LAB SERVICES (OUTPATIENT)
Dept: LAB | Age: 70
End: 2020-09-15

## 2020-09-15 ENCOUNTER — OFFICE VISIT (OUTPATIENT)
Dept: INTERNAL MEDICINE | Age: 70
End: 2020-09-15

## 2020-09-15 VITALS
BODY MASS INDEX: 34.36 KG/M2 | DIASTOLIC BLOOD PRESSURE: 72 MMHG | HEIGHT: 69 IN | HEART RATE: 68 BPM | RESPIRATION RATE: 18 BRPM | TEMPERATURE: 98 F | WEIGHT: 232 LBS | SYSTOLIC BLOOD PRESSURE: 118 MMHG

## 2020-09-15 DIAGNOSIS — E78.5 DYSLIPIDEMIA: ICD-10-CM

## 2020-09-15 DIAGNOSIS — R07.89 OTHER CHEST PAIN: ICD-10-CM

## 2020-09-15 DIAGNOSIS — E66.09 EXOGENOUS OBESITY: ICD-10-CM

## 2020-09-15 DIAGNOSIS — I47.29 VENTRICULAR TACHYCARDIA (PAROXYSMAL) (CMD): ICD-10-CM

## 2020-09-15 DIAGNOSIS — Z12.31 VISIT FOR SCREENING MAMMOGRAM: ICD-10-CM

## 2020-09-15 DIAGNOSIS — I10 BENIGN ESSENTIAL HYPERTENSION: ICD-10-CM

## 2020-09-15 DIAGNOSIS — R53.83 FATIGUE, UNSPECIFIED TYPE: ICD-10-CM

## 2020-09-15 LAB
ALBUMIN SERPL-MCNC: 4.1 G/DL (ref 3.6–5.1)
ALBUMIN/GLOB SERPL: 1.1 {RATIO} (ref 1–2.4)
ALP SERPL-CCNC: 80 UNITS/L (ref 45–117)
ALT SERPL-CCNC: 15 UNITS/L
ANION GAP SERPL CALC-SCNC: 11 MMOL/L (ref 10–20)
AST SERPL-CCNC: 7 UNITS/L
BASOPHILS # BLD: 0.1 K/MCL (ref 0–0.3)
BASOPHILS NFR BLD: 1 %
BILIRUB SERPL-MCNC: 0.3 MG/DL (ref 0.2–1)
BUN SERPL-MCNC: 31 MG/DL (ref 6–20)
BUN/CREAT SERPL: 26 (ref 7–25)
CALCIUM SERPL-MCNC: 10.1 MG/DL (ref 8.4–10.2)
CHLORIDE SERPL-SCNC: 104 MMOL/L (ref 98–107)
CO2 SERPL-SCNC: 30 MMOL/L (ref 21–32)
CREAT SERPL-MCNC: 1.21 MG/DL (ref 0.51–0.95)
DIFFERENTIAL METHOD BLD: ABNORMAL
EOSINOPHIL # BLD: 0.3 K/MCL (ref 0.1–0.5)
EOSINOPHIL NFR BLD: 3 %
ERYTHROCYTE [DISTWIDTH] IN BLOOD: 16.6 % (ref 11–15)
GLOBULIN SER-MCNC: 3.8 G/DL (ref 2–4)
GLUCOSE SERPL-MCNC: 112 MG/DL (ref 65–99)
HCT VFR BLD CALC: 34.4 % (ref 36–46.5)
HGB BLD-MCNC: 10.9 G/DL (ref 12–15.5)
IMM GRANULOCYTES # BLD AUTO: 0 K/MCL (ref 0–0.2)
IMM GRANULOCYTES NFR BLD: 0 %
LENGTH OF FAST TIME PATIENT: ABNORMAL HRS
LYMPHOCYTES # BLD: 2.8 K/MCL (ref 1–4)
LYMPHOCYTES NFR BLD: 30 %
MCH RBC QN AUTO: 28.2 PG (ref 26–34)
MCHC RBC AUTO-ENTMCNC: 31.7 G/DL (ref 32–36.5)
MCV RBC AUTO: 88.9 FL (ref 78–100)
MONOCYTES # BLD: 0.7 K/MCL (ref 0.3–0.9)
MONOCYTES NFR BLD: 7 %
NEUTROPHILS # BLD: 5.5 K/MCL (ref 1.8–7.7)
NEUTROPHILS NFR BLD: 59 %
NRBC BLD MANUAL-RTO: 0 /100 WBC
PLATELET # BLD: 263 K/MCL (ref 140–450)
POTASSIUM SERPL-SCNC: 4.1 MMOL/L (ref 3.4–5.1)
PROT SERPL-MCNC: 7.9 G/DL (ref 6.4–8.2)
RBC # BLD: 3.87 MIL/MCL (ref 4–5.2)
SODIUM SERPL-SCNC: 141 MMOL/L (ref 135–145)
WBC # BLD: 9.3 K/MCL (ref 4.2–11)

## 2020-09-15 PROCEDURE — 36415 COLL VENOUS BLD VENIPUNCTURE: CPT

## 2020-09-15 PROCEDURE — 85025 COMPLETE CBC W/AUTO DIFF WBC: CPT | Performed by: INTERNAL MEDICINE

## 2020-09-15 PROCEDURE — 3078F DIAST BP <80 MM HG: CPT | Performed by: INTERNAL MEDICINE

## 2020-09-15 PROCEDURE — 80053 COMPREHEN METABOLIC PANEL: CPT | Performed by: INTERNAL MEDICINE

## 2020-09-15 PROCEDURE — 3074F SYST BP LT 130 MM HG: CPT | Performed by: INTERNAL MEDICINE

## 2020-09-15 PROCEDURE — 83036 HEMOGLOBIN GLYCOSYLATED A1C: CPT | Performed by: INTERNAL MEDICINE

## 2020-09-15 PROCEDURE — 82043 UR ALBUMIN QUANTITATIVE: CPT | Performed by: INTERNAL MEDICINE

## 2020-09-15 PROCEDURE — 99214 OFFICE O/P EST MOD 30 MIN: CPT | Performed by: INTERNAL MEDICINE

## 2020-09-15 SDOH — ECONOMIC STABILITY: TRANSPORTATION INSECURITY
IN THE PAST 12 MONTHS, HAS LACK OF TRANSPORTATION KEPT YOU FROM MEETINGS, WORK, OR FROM GETTING THINGS NEEDED FOR DAILY LIVING?: NO

## 2020-09-15 SDOH — HEALTH STABILITY: MENTAL HEALTH: HOW OFTEN DO YOU HAVE A DRINK CONTAINING ALCOHOL?: NEVER

## 2020-09-15 SDOH — ECONOMIC STABILITY: TRANSPORTATION INSECURITY
IN THE PAST 12 MONTHS, HAS THE LACK OF TRANSPORTATION KEPT YOU FROM MEDICAL APPOINTMENTS OR FROM GETTING MEDICATIONS?: NO

## 2020-09-15 ASSESSMENT — PATIENT HEALTH QUESTIONNAIRE - PHQ9
1. LITTLE INTEREST OR PLEASURE IN DOING THINGS: NOT AT ALL
CLINICAL INTERPRETATION OF PHQ2 SCORE: NO FURTHER SCREENING NEEDED
SUM OF ALL RESPONSES TO PHQ9 QUESTIONS 1 AND 2: 0
SUM OF ALL RESPONSES TO PHQ9 QUESTIONS 1 AND 2: 0
2. FEELING DOWN, DEPRESSED OR HOPELESS: NOT AT ALL
CLINICAL INTERPRETATION OF PHQ9 SCORE: NO FURTHER SCREENING NEEDED

## 2020-09-16 LAB
CREAT UR-MCNC: 39 MG/DL
HBA1C MFR BLD: 7.7 % (ref 4.5–5.6)
MICROALBUMIN UR-MCNC: 4.13 MG/DL
MICROALBUMIN/CREAT UR: 105.9 MG/G

## 2020-09-30 ENCOUNTER — ADVANCED DIRECTIVES (OUTPATIENT)
Dept: INTERNAL MEDICINE | Age: 70
End: 2020-09-30

## 2020-10-13 ENCOUNTER — OFFICE VISIT (OUTPATIENT)
Dept: INTERNAL MEDICINE | Age: 70
End: 2020-10-13

## 2020-10-13 DIAGNOSIS — E11.29 DIABETES MELLITUS WITH MICROALBUMINURIA (CMD): Primary | ICD-10-CM

## 2020-10-13 DIAGNOSIS — R80.9 DIABETES MELLITUS WITH MICROALBUMINURIA (CMD): Primary | ICD-10-CM

## 2020-10-13 PROCEDURE — 3074F SYST BP LT 130 MM HG: CPT | Performed by: INTERNAL MEDICINE

## 2020-10-13 PROCEDURE — 99215 OFFICE O/P EST HI 40 MIN: CPT | Performed by: INTERNAL MEDICINE

## 2020-10-13 PROCEDURE — 3078F DIAST BP <80 MM HG: CPT | Performed by: INTERNAL MEDICINE

## 2020-10-13 SDOH — HEALTH STABILITY: MENTAL HEALTH: HOW OFTEN DO YOU HAVE A DRINK CONTAINING ALCOHOL?: NEVER

## 2020-10-13 ASSESSMENT — PATIENT HEALTH QUESTIONNAIRE - PHQ9
1. LITTLE INTEREST OR PLEASURE IN DOING THINGS: NOT AT ALL
2. FEELING DOWN, DEPRESSED OR HOPELESS: NOT AT ALL
CLINICAL INTERPRETATION OF PHQ9 SCORE: NO FURTHER SCREENING NEEDED
CLINICAL INTERPRETATION OF PHQ2 SCORE: NO FURTHER SCREENING NEEDED
SUM OF ALL RESPONSES TO PHQ9 QUESTIONS 1 AND 2: 0
SUM OF ALL RESPONSES TO PHQ9 QUESTIONS 1 AND 2: 0

## 2020-10-13 ASSESSMENT — COGNITIVE AND FUNCTIONAL STATUS - GENERAL
BECAUSE OF A PHYSICAL, MENTAL, OR EMOTIONAL CONDITION, DO YOU HAVE DIFFICULTY DOING ERRANDS ALONE: NO
BECAUSE OF A PHYSICAL, MENTAL, OR EMOTIONAL CONDITION, DO YOU HAVE SERIOUS DIFFICULTY CONCENTRATING, REMEMBERING OR MAKING DECISIONS: NO
DO YOU HAVE DIFFICULTY DRESSING OR BATHING: NO
DO YOU HAVE SERIOUS DIFFICULTY WALKING OR CLIMBING STAIRS: YES

## 2020-10-13 ASSESSMENT — PAIN SCALES - GENERAL: PAINLEVEL: 0

## 2020-10-29 DIAGNOSIS — I10 BENIGN ESSENTIAL HYPERTENSION: ICD-10-CM

## 2020-10-29 RX ORDER — FELODIPINE 10 MG/1
TABLET, EXTENDED RELEASE ORAL
Qty: 90 TABLET | Refills: 0 | OUTPATIENT
Start: 2020-10-29

## 2020-10-29 RX ORDER — ROSUVASTATIN CALCIUM 40 MG/1
TABLET, COATED ORAL
Qty: 90 TABLET | Refills: 0 | OUTPATIENT
Start: 2020-10-29

## 2020-10-29 RX ORDER — GLIMEPIRIDE 2 MG/1
TABLET ORAL
Qty: 180 TABLET | Refills: 0 | OUTPATIENT
Start: 2020-10-29

## 2020-10-29 RX ORDER — LANCETS
EACH MISCELLANEOUS
Qty: 100 EACH | Refills: 0 | Status: SHIPPED | OUTPATIENT
Start: 2020-10-29 | End: 2021-02-21

## 2020-10-29 RX ORDER — CARVEDILOL 25 MG/1
TABLET ORAL
Qty: 180 TABLET | Refills: 0 | OUTPATIENT
Start: 2020-10-29

## 2020-10-29 RX ORDER — FUROSEMIDE 40 MG/1
TABLET ORAL
Qty: 180 TABLET | Refills: 0 | OUTPATIENT
Start: 2020-10-29

## 2020-10-29 RX ORDER — SPIRONOLACTONE 50 MG/1
TABLET, FILM COATED ORAL
Qty: 90 TABLET | Refills: 0 | OUTPATIENT
Start: 2020-10-29

## 2020-10-29 RX ORDER — LISINOPRIL 40 MG/1
TABLET ORAL
Qty: 90 TABLET | Refills: 0 | OUTPATIENT
Start: 2020-10-29

## 2020-10-30 DIAGNOSIS — I10 BENIGN ESSENTIAL HYPERTENSION: ICD-10-CM

## 2020-10-30 RX ORDER — SPIRONOLACTONE 50 MG/1
TABLET, FILM COATED ORAL
Qty: 90 TABLET | Refills: 0 | OUTPATIENT
Start: 2020-10-30

## 2020-10-30 RX ORDER — CARVEDILOL 25 MG/1
TABLET ORAL
Qty: 180 TABLET | Refills: 0 | OUTPATIENT
Start: 2020-10-30

## 2020-10-30 RX ORDER — FUROSEMIDE 40 MG/1
TABLET ORAL
Qty: 180 TABLET | Refills: 0 | OUTPATIENT
Start: 2020-10-30

## 2020-10-30 RX ORDER — GLIMEPIRIDE 2 MG/1
TABLET ORAL
Qty: 180 TABLET | Refills: 0 | OUTPATIENT
Start: 2020-10-30

## 2020-10-30 RX ORDER — LISINOPRIL 40 MG/1
TABLET ORAL
Qty: 90 TABLET | Refills: 0 | OUTPATIENT
Start: 2020-10-30

## 2020-10-30 RX ORDER — ROSUVASTATIN CALCIUM 40 MG/1
TABLET, COATED ORAL
Qty: 90 TABLET | Refills: 0 | OUTPATIENT
Start: 2020-10-30

## 2020-10-30 RX ORDER — FELODIPINE 10 MG/1
TABLET, EXTENDED RELEASE ORAL
Qty: 90 TABLET | Refills: 0 | OUTPATIENT
Start: 2020-10-30

## 2020-10-31 ENCOUNTER — TELEPHONE (OUTPATIENT)
Dept: SCHEDULING | Age: 70
End: 2020-10-31

## 2020-10-31 RX ORDER — ROSUVASTATIN CALCIUM 40 MG/1
40 TABLET, COATED ORAL DAILY
Qty: 90 TABLET | Refills: 0 | Status: SHIPPED | OUTPATIENT
Start: 2020-10-31 | End: 2021-02-21

## 2020-10-31 RX ORDER — FELODIPINE 10 MG/1
10 TABLET, EXTENDED RELEASE ORAL DAILY
Qty: 90 TABLET | Refills: 3 | Status: SHIPPED | OUTPATIENT
Start: 2020-10-31

## 2020-10-31 RX ORDER — SPIRONOLACTONE 50 MG/1
50 TABLET, FILM COATED ORAL DAILY
Qty: 90 TABLET | Refills: 0 | Status: SHIPPED | OUTPATIENT
Start: 2020-10-31 | End: 2021-02-21

## 2020-10-31 RX ORDER — LISINOPRIL 40 MG/1
40 TABLET ORAL DAILY
Qty: 90 TABLET | Refills: 1 | Status: SHIPPED | OUTPATIENT
Start: 2020-10-31 | End: 2021-05-04 | Stop reason: SDUPTHER

## 2020-10-31 RX ORDER — CARVEDILOL 25 MG/1
25 TABLET ORAL 2 TIMES DAILY WITH MEALS
Qty: 180 TABLET | Refills: 3 | Status: SHIPPED | OUTPATIENT
Start: 2020-10-31

## 2020-10-31 RX ORDER — FUROSEMIDE 40 MG/1
40 TABLET ORAL 2 TIMES DAILY
Qty: 180 TABLET | Refills: 1 | Status: SHIPPED | OUTPATIENT
Start: 2020-10-31 | End: 2021-05-04 | Stop reason: SDUPTHER

## 2020-11-02 ENCOUNTER — TELEPHONE (OUTPATIENT)
Dept: SCHEDULING | Age: 70
End: 2020-11-02

## 2020-11-02 RX ORDER — GLIMEPIRIDE 2 MG/1
TABLET ORAL
Qty: 180 TABLET | Refills: 0 | Status: SHIPPED | OUTPATIENT
Start: 2020-11-02 | End: 2021-02-21

## 2020-11-03 ENCOUNTER — TELEPHONE (OUTPATIENT)
Dept: SCHEDULING | Age: 70
End: 2020-11-03

## 2020-11-23 DIAGNOSIS — Z12.31 VISIT FOR SCREENING MAMMOGRAM: ICD-10-CM

## 2020-12-11 ENCOUNTER — TELEPHONE (OUTPATIENT)
Dept: SCHEDULING | Age: 70
End: 2020-12-11

## 2020-12-15 ENCOUNTER — APPOINTMENT (OUTPATIENT)
Dept: INTERNAL MEDICINE | Age: 70
End: 2020-12-15

## 2021-01-01 ENCOUNTER — EXTERNAL RECORD (OUTPATIENT)
Dept: HEALTH INFORMATION MANAGEMENT | Facility: OTHER | Age: 71
End: 2021-01-01

## 2021-02-03 DIAGNOSIS — Z23 NEED FOR VACCINATION: ICD-10-CM

## 2021-02-08 ENCOUNTER — TELEPHONE (OUTPATIENT)
Dept: SCHEDULING | Age: 71
End: 2021-02-08

## 2021-02-09 ENCOUNTER — APPOINTMENT (OUTPATIENT)
Dept: INTERNAL MEDICINE | Age: 71
End: 2021-02-09

## 2021-02-10 ENCOUNTER — TELEPHONE (OUTPATIENT)
Dept: SCHEDULING | Age: 71
End: 2021-02-10

## 2021-02-21 RX ORDER — ROSUVASTATIN CALCIUM 40 MG/1
TABLET, COATED ORAL
Qty: 90 TABLET | Refills: 3 | Status: SHIPPED | OUTPATIENT
Start: 2021-02-21

## 2021-02-21 RX ORDER — LANCETS
EACH MISCELLANEOUS
Qty: 100 EACH | Refills: 3 | Status: SHIPPED | OUTPATIENT
Start: 2021-02-21

## 2021-02-21 RX ORDER — GLIMEPIRIDE 2 MG/1
TABLET ORAL
Qty: 180 TABLET | Refills: 3 | Status: SHIPPED | OUTPATIENT
Start: 2021-02-21 | End: 2021-05-04 | Stop reason: CLARIF

## 2021-02-21 RX ORDER — SPIRONOLACTONE 50 MG/1
TABLET, FILM COATED ORAL
Qty: 90 TABLET | Refills: 1 | Status: SHIPPED | OUTPATIENT
Start: 2021-02-21 | End: 2021-08-05

## 2021-02-23 ENCOUNTER — APPOINTMENT (OUTPATIENT)
Dept: INTERNAL MEDICINE | Age: 71
End: 2021-02-23

## 2021-03-05 ENCOUNTER — IMMUNIZATION (OUTPATIENT)
Dept: LAB | Age: 71
End: 2021-03-05

## 2021-03-05 DIAGNOSIS — Z23 NEED FOR VACCINATION: Primary | ICD-10-CM

## 2021-03-05 PROCEDURE — 91301 COVID-19 MODERNA VACCINE: CPT

## 2021-03-05 PROCEDURE — 0011A COVID-19 MODERNA VACCINE: CPT

## 2021-03-10 ENCOUNTER — TELEPHONE (OUTPATIENT)
Dept: SCHEDULING | Age: 71
End: 2021-03-10

## 2021-03-30 ENCOUNTER — APPOINTMENT (OUTPATIENT)
Dept: INTERNAL MEDICINE | Age: 71
End: 2021-03-30

## 2021-04-02 ENCOUNTER — IMMUNIZATION (OUTPATIENT)
Dept: LAB | Age: 71
End: 2021-04-02
Attending: HOSPITALIST

## 2021-04-02 DIAGNOSIS — Z23 NEED FOR VACCINATION: Primary | ICD-10-CM

## 2021-04-02 PROCEDURE — 91301 COVID-19 MODERNA VACCINE: CPT

## 2021-04-02 PROCEDURE — 0012A COVID-19 MODERNA VACCINE: CPT

## 2021-04-04 ENCOUNTER — TELEPHONE (OUTPATIENT)
Dept: SCHEDULING | Age: 71
End: 2021-04-04

## 2021-04-05 ENCOUNTER — TELEPHONE (OUTPATIENT)
Dept: SCHEDULING | Age: 71
End: 2021-04-05

## 2021-04-06 ENCOUNTER — APPOINTMENT (OUTPATIENT)
Dept: INTERNAL MEDICINE | Age: 71
End: 2021-04-06

## 2021-04-10 ENCOUNTER — TELEPHONE (OUTPATIENT)
Dept: SCHEDULING | Age: 71
End: 2021-04-10

## 2021-04-13 ENCOUNTER — APPOINTMENT (OUTPATIENT)
Dept: INTERNAL MEDICINE | Age: 71
End: 2021-04-13

## 2021-05-04 ENCOUNTER — OFFICE VISIT (OUTPATIENT)
Dept: INTERNAL MEDICINE | Age: 71
End: 2021-05-04

## 2021-05-04 DIAGNOSIS — I87.2 VENOUS INSUFFICIENCY: Primary | ICD-10-CM

## 2021-05-04 DIAGNOSIS — E66.9 OBESITY (BMI 30-39.9): ICD-10-CM

## 2021-05-04 DIAGNOSIS — E78.5 DYSLIPIDEMIA: ICD-10-CM

## 2021-05-04 DIAGNOSIS — E11.29 DIABETES MELLITUS WITH MICROALBUMINURIA (CMD): ICD-10-CM

## 2021-05-04 DIAGNOSIS — R80.9 DIABETES MELLITUS WITH MICROALBUMINURIA (CMD): ICD-10-CM

## 2021-05-04 DIAGNOSIS — I10 BENIGN ESSENTIAL HYPERTENSION: ICD-10-CM

## 2021-05-04 DIAGNOSIS — G47.33 OBSTRUCTIVE SLEEP APNEA: ICD-10-CM

## 2021-05-04 DIAGNOSIS — N18.31 STAGE 3A CHRONIC KIDNEY DISEASE (CMD): ICD-10-CM

## 2021-05-04 DIAGNOSIS — E66.09 EXOGENOUS OBESITY: ICD-10-CM

## 2021-05-04 PROBLEM — Z91.199 NONCOMPLIANCE WITH CPAP TREATMENT: Status: ACTIVE | Noted: 2021-05-04

## 2021-05-04 PROBLEM — E11.22 TYPE 2 DM WITH CKD STAGE 3 AND HYPERTENSION (CMD): Status: ACTIVE | Noted: 2017-08-30

## 2021-05-04 PROBLEM — I12.9 TYPE 2 DM WITH CKD STAGE 3 AND HYPERTENSION (CMD): Status: ACTIVE | Noted: 2017-08-30

## 2021-05-04 PROBLEM — N18.30 TYPE 2 DM WITH CKD STAGE 3 AND HYPERTENSION (CMD): Status: ACTIVE | Noted: 2017-08-30

## 2021-05-04 PROBLEM — K21.9 GASTROESOPHAGEAL REFLUX DISEASE WITHOUT ESOPHAGITIS: Status: ACTIVE | Noted: 2021-05-04

## 2021-05-04 PROBLEM — D50.0 ANEMIA DUE TO GI BLOOD LOSS: Status: RESOLVED | Noted: 2020-06-08 | Resolved: 2021-05-04

## 2021-05-04 PROCEDURE — 3078F DIAST BP <80 MM HG: CPT | Performed by: INTERNAL MEDICINE

## 2021-05-04 PROCEDURE — 3074F SYST BP LT 130 MM HG: CPT | Performed by: INTERNAL MEDICINE

## 2021-05-04 PROCEDURE — 99214 OFFICE O/P EST MOD 30 MIN: CPT | Performed by: INTERNAL MEDICINE

## 2021-05-04 RX ORDER — FUROSEMIDE 40 MG/1
40 TABLET ORAL DAILY
Qty: 90 TABLET | Refills: 1 | Status: SHIPPED | COMMUNITY
Start: 2021-05-04 | End: 2021-08-05

## 2021-05-04 RX ORDER — LISINOPRIL 40 MG/1
40 TABLET ORAL DAILY
Qty: 90 TABLET | Refills: 1 | Status: SHIPPED | OUTPATIENT
Start: 2021-05-04 | End: 2022-05-04

## 2021-05-04 ASSESSMENT — PATIENT HEALTH QUESTIONNAIRE - PHQ9
SUM OF ALL RESPONSES TO PHQ9 QUESTIONS 1 AND 2: 0
2. FEELING DOWN, DEPRESSED OR HOPELESS: NOT AT ALL
CLINICAL INTERPRETATION OF PHQ2 SCORE: NO FURTHER SCREENING NEEDED
CLINICAL INTERPRETATION OF PHQ9 SCORE: NO FURTHER SCREENING NEEDED
SUM OF ALL RESPONSES TO PHQ9 QUESTIONS 1 AND 2: 0
1. LITTLE INTEREST OR PLEASURE IN DOING THINGS: NOT AT ALL

## 2021-05-04 ASSESSMENT — PAIN SCALES - GENERAL: PAINLEVEL: 0

## 2021-05-20 ENCOUNTER — TELEPHONE (OUTPATIENT)
Dept: SCHEDULING | Age: 71
End: 2021-05-20

## 2021-05-20 DIAGNOSIS — I87.2 VENOUS INSUFFICIENCY: ICD-10-CM

## 2021-05-20 RX ORDER — FUROSEMIDE 40 MG/1
TABLET ORAL
Qty: 180 TABLET | Refills: 0 | OUTPATIENT
Start: 2021-05-20

## 2021-05-21 ENCOUNTER — TELEPHONE (OUTPATIENT)
Dept: SCHEDULING | Age: 71
End: 2021-05-21

## 2021-05-22 DIAGNOSIS — I87.2 VENOUS INSUFFICIENCY: ICD-10-CM

## 2021-05-24 RX ORDER — FUROSEMIDE 40 MG/1
TABLET ORAL
Qty: 180 TABLET | Refills: 0 | OUTPATIENT
Start: 2021-05-24

## 2021-05-25 ENCOUNTER — APPOINTMENT (OUTPATIENT)
Dept: INTERNAL MEDICINE | Age: 71
End: 2021-05-25

## 2021-05-25 VITALS
DIASTOLIC BLOOD PRESSURE: 57 MMHG | HEART RATE: 65 BPM | BODY MASS INDEX: 36.29 KG/M2 | SYSTOLIC BLOOD PRESSURE: 123 MMHG | HEART RATE: 72 BPM | TEMPERATURE: 96.9 F | TEMPERATURE: 97.7 F | SYSTOLIC BLOOD PRESSURE: 115 MMHG | HEIGHT: 69 IN | DIASTOLIC BLOOD PRESSURE: 65 MMHG | BODY MASS INDEX: 34.25 KG/M2 | WEIGHT: 231.26 LBS | HEIGHT: 69 IN | WEIGHT: 245 LBS

## 2021-06-08 ENCOUNTER — NURSE TRIAGE (OUTPATIENT)
Dept: TELEHEALTH | Age: 71
End: 2021-06-08

## 2021-06-08 ENCOUNTER — TELEPHONE (OUTPATIENT)
Dept: SCHEDULING | Age: 71
End: 2021-06-08

## 2021-06-08 ENCOUNTER — APPOINTMENT (OUTPATIENT)
Dept: LAB | Age: 71
End: 2021-06-08

## 2021-06-08 ENCOUNTER — APPOINTMENT (OUTPATIENT)
Dept: INTERNAL MEDICINE | Age: 71
End: 2021-06-08

## 2021-06-10 ENCOUNTER — LAB SERVICES (OUTPATIENT)
Dept: LAB | Age: 71
End: 2021-06-10

## 2021-06-10 DIAGNOSIS — I87.2 VENOUS INSUFFICIENCY: ICD-10-CM

## 2021-06-10 DIAGNOSIS — I10 BENIGN ESSENTIAL HYPERTENSION: ICD-10-CM

## 2021-06-10 DIAGNOSIS — E78.5 DYSLIPIDEMIA: ICD-10-CM

## 2021-06-10 DIAGNOSIS — R80.9 DIABETES MELLITUS WITH MICROALBUMINURIA (CMD): ICD-10-CM

## 2021-06-10 DIAGNOSIS — E11.29 DIABETES MELLITUS WITH MICROALBUMINURIA (CMD): ICD-10-CM

## 2021-06-10 LAB
ALBUMIN SERPL-MCNC: 3.7 G/DL (ref 3.6–5.1)
ALBUMIN/GLOB SERPL: 1.1 {RATIO} (ref 1–2.4)
ALP SERPL-CCNC: 105 UNITS/L (ref 45–117)
ALT SERPL-CCNC: 18 UNITS/L
ANION GAP SERPL CALC-SCNC: 8 MMOL/L (ref 10–20)
AST SERPL-CCNC: 11 UNITS/L
BILIRUB SERPL-MCNC: 0.4 MG/DL (ref 0.2–1)
BUN SERPL-MCNC: 22 MG/DL (ref 6–20)
BUN/CREAT SERPL: 20 (ref 7–25)
CALCIUM SERPL-MCNC: 9.3 MG/DL (ref 8.4–10.2)
CHLORIDE SERPL-SCNC: 104 MMOL/L (ref 98–107)
CHOLEST SERPL-MCNC: 150 MG/DL
CHOLEST/HDLC SERPL: 2.5 {RATIO}
CO2 SERPL-SCNC: 32 MMOL/L (ref 21–32)
CREAT SERPL-MCNC: 1.08 MG/DL (ref 0.51–0.95)
CREAT UR-MCNC: 264 MG/DL
FASTING DURATION TIME PATIENT: ABNORMAL H
FASTING DURATION TIME PATIENT: NORMAL H
GFR SERPLBLD BASED ON 1.73 SQ M-ARVRAT: 61 ML/MIN/1.73M2
GLOBULIN SER-MCNC: 3.3 G/DL (ref 2–4)
GLUCOSE SERPL-MCNC: 261 MG/DL (ref 65–99)
HBA1C MFR BLD: 12.3 % (ref 4.5–5.6)
HDLC SERPL-MCNC: 59 MG/DL
LDLC SERPL CALC-MCNC: 76 MG/DL
MICROALBUMIN UR-MCNC: 12.7 MG/DL
MICROALBUMIN/CREAT UR: 48.1 MG/G
NONHDLC SERPL-MCNC: 91 MG/DL
POTASSIUM SERPL-SCNC: 4 MMOL/L (ref 3.4–5.1)
PROT SERPL-MCNC: 7 G/DL (ref 6.4–8.2)
SODIUM SERPL-SCNC: 140 MMOL/L (ref 135–145)
TRIGL SERPL-MCNC: 77 MG/DL

## 2021-06-10 PROCEDURE — 82570 ASSAY OF URINE CREATININE: CPT | Performed by: INTERNAL MEDICINE

## 2021-06-10 PROCEDURE — 36415 COLL VENOUS BLD VENIPUNCTURE: CPT

## 2021-06-10 PROCEDURE — 83036 HEMOGLOBIN GLYCOSYLATED A1C: CPT | Performed by: INTERNAL MEDICINE

## 2021-06-10 PROCEDURE — 80061 LIPID PANEL: CPT | Performed by: INTERNAL MEDICINE

## 2021-06-10 PROCEDURE — 82043 UR ALBUMIN QUANTITATIVE: CPT | Performed by: INTERNAL MEDICINE

## 2021-06-10 PROCEDURE — 80053 COMPREHEN METABOLIC PANEL: CPT | Performed by: INTERNAL MEDICINE

## 2021-06-11 PROBLEM — N18.2 TYPE 2 DM WITH CKD STAGE 2 AND HYPERTENSION (CMD): Status: ACTIVE | Noted: 2021-06-11

## 2021-06-11 PROBLEM — I12.9 TYPE 2 DM WITH CKD STAGE 2 AND HYPERTENSION (CMD): Status: ACTIVE | Noted: 2021-06-11

## 2021-06-11 PROBLEM — N18.31 STAGE 3A CHRONIC KIDNEY DISEASE (CMD): Status: RESOLVED | Noted: 2021-05-04 | Resolved: 2021-06-11

## 2021-06-11 PROBLEM — E11.22 TYPE 2 DM WITH CKD STAGE 2 AND HYPERTENSION (CMD): Status: ACTIVE | Noted: 2021-06-11

## 2021-06-11 PROBLEM — N18.2 CKD STAGE 2 DUE TO TYPE 2 DIABETES MELLITUS (CMD): Status: ACTIVE | Noted: 2017-08-30

## 2021-06-14 ENCOUNTER — TELEPHONE (OUTPATIENT)
Dept: INTERNAL MEDICINE | Age: 71
End: 2021-06-14

## 2021-06-15 ENCOUNTER — OFFICE VISIT (OUTPATIENT)
Dept: INTERNAL MEDICINE | Age: 71
End: 2021-06-15

## 2021-06-15 VITALS
OXYGEN SATURATION: 95 % | HEIGHT: 69 IN | TEMPERATURE: 98 F | SYSTOLIC BLOOD PRESSURE: 101 MMHG | DIASTOLIC BLOOD PRESSURE: 67 MMHG | HEART RATE: 74 BPM | BODY MASS INDEX: 35.17 KG/M2 | WEIGHT: 237.44 LBS

## 2021-06-15 DIAGNOSIS — I12.9 TYPE 2 DM WITH CKD STAGE 2 AND HYPERTENSION (CMD): ICD-10-CM

## 2021-06-15 DIAGNOSIS — E11.22 TYPE 2 DM WITH CKD STAGE 2 AND HYPERTENSION (CMD): ICD-10-CM

## 2021-06-15 DIAGNOSIS — I10 BENIGN ESSENTIAL HYPERTENSION: Primary | ICD-10-CM

## 2021-06-15 DIAGNOSIS — N18.2 TYPE 2 DM WITH CKD STAGE 2 AND HYPERTENSION (CMD): ICD-10-CM

## 2021-06-15 PROCEDURE — 99214 OFFICE O/P EST MOD 30 MIN: CPT | Performed by: INTERNAL MEDICINE

## 2021-06-15 ASSESSMENT — PATIENT HEALTH QUESTIONNAIRE - PHQ9
1. LITTLE INTEREST OR PLEASURE IN DOING THINGS: MORE THAN HALF THE DAYS
2. FEELING DOWN, DEPRESSED OR HOPELESS: NOT AT ALL
CLINICAL INTERPRETATION OF PHQ9 SCORE: NO FURTHER SCREENING NEEDED
SUM OF ALL RESPONSES TO PHQ9 QUESTIONS 1 AND 2: 2
CLINICAL INTERPRETATION OF PHQ2 SCORE: NO FURTHER SCREENING NEEDED
SUM OF ALL RESPONSES TO PHQ9 QUESTIONS 1 AND 2: 2

## 2021-07-05 ENCOUNTER — TELEPHONE (OUTPATIENT)
Dept: INTERNAL MEDICINE | Age: 71
End: 2021-07-05

## 2021-07-05 ENCOUNTER — TELEPHONE (OUTPATIENT)
Dept: SCHEDULING | Age: 71
End: 2021-07-05

## 2021-07-20 ENCOUNTER — APPOINTMENT (OUTPATIENT)
Dept: INTERNAL MEDICINE | Age: 71
End: 2021-07-20

## 2021-08-05 DIAGNOSIS — I87.2 VENOUS INSUFFICIENCY: ICD-10-CM

## 2021-08-05 RX ORDER — SPIRONOLACTONE 50 MG/1
TABLET, FILM COATED ORAL
Qty: 90 TABLET | Refills: 0 | Status: SHIPPED | OUTPATIENT
Start: 2021-08-05

## 2021-08-05 RX ORDER — FUROSEMIDE 40 MG/1
TABLET ORAL
Qty: 180 TABLET | Refills: 3 | Status: SHIPPED | OUTPATIENT
Start: 2021-08-05 | End: 2022-10-31

## 2021-09-28 ENCOUNTER — OFFICE VISIT (OUTPATIENT)
Dept: INTERNAL MEDICINE CLINIC | Facility: CLINIC | Age: 71
End: 2021-09-28
Payer: MEDICARE

## 2021-09-28 VITALS
HEIGHT: 69 IN | BODY MASS INDEX: 34.96 KG/M2 | SYSTOLIC BLOOD PRESSURE: 120 MMHG | TEMPERATURE: 97 F | RESPIRATION RATE: 20 BRPM | HEART RATE: 60 BPM | DIASTOLIC BLOOD PRESSURE: 74 MMHG | WEIGHT: 236 LBS

## 2021-09-28 DIAGNOSIS — N18.31 TYPE 2 DIABETES MELLITUS WITH STAGE 3A CHRONIC KIDNEY DISEASE, WITHOUT LONG-TERM CURRENT USE OF INSULIN (HCC): Primary | ICD-10-CM

## 2021-09-28 DIAGNOSIS — E11.22 TYPE 2 DIABETES MELLITUS WITH STAGE 3A CHRONIC KIDNEY DISEASE, WITHOUT LONG-TERM CURRENT USE OF INSULIN (HCC): Primary | ICD-10-CM

## 2021-09-28 DIAGNOSIS — E78.5 HYPERLIPIDEMIA, UNSPECIFIED HYPERLIPIDEMIA TYPE: ICD-10-CM

## 2021-09-28 DIAGNOSIS — I10 PRIMARY HYPERTENSION: ICD-10-CM

## 2021-09-28 DIAGNOSIS — E55.9 VITAMIN D DEFICIENCY: ICD-10-CM

## 2021-09-28 DIAGNOSIS — R06.00 DOE (DYSPNEA ON EXERTION): ICD-10-CM

## 2021-09-28 DIAGNOSIS — K62.5 GASTROINTESTINAL HEMORRHAGE ASSOCIATED WITH ANORECTAL SOURCE: ICD-10-CM

## 2021-09-28 PROBLEM — K92.2 GI HEMORRHAGE: Status: RESOLVED | Noted: 2020-06-05 | Resolved: 2021-09-28

## 2021-09-28 PROBLEM — K92.1 GASTROINTESTINAL HEMORRHAGE WITH MELENA: Status: RESOLVED | Noted: 2020-06-04 | Resolved: 2021-09-28

## 2021-09-28 PROBLEM — K92.1 MELENA: Status: RESOLVED | Noted: 2020-06-04 | Resolved: 2021-09-28

## 2021-09-28 PROBLEM — R73.9 HYPERGLYCEMIA: Status: RESOLVED | Noted: 2020-06-04 | Resolved: 2021-09-28

## 2021-09-28 PROBLEM — E11.9 TYPE 2 DIABETES MELLITUS, WITHOUT LONG-TERM CURRENT USE OF INSULIN (HCC): Status: ACTIVE | Noted: 2021-09-28

## 2021-09-28 PROCEDURE — 3078F DIAST BP <80 MM HG: CPT | Performed by: INTERNAL MEDICINE

## 2021-09-28 PROCEDURE — 99204 OFFICE O/P NEW MOD 45 MIN: CPT | Performed by: INTERNAL MEDICINE

## 2021-09-28 PROCEDURE — 3074F SYST BP LT 130 MM HG: CPT | Performed by: INTERNAL MEDICINE

## 2021-09-28 PROCEDURE — 3008F BODY MASS INDEX DOCD: CPT | Performed by: INTERNAL MEDICINE

## 2021-09-28 RX ORDER — BLOOD SUGAR DIAGNOSTIC
STRIP MISCELLANEOUS 2 TIMES DAILY
COMMUNITY
Start: 2021-08-05

## 2021-09-28 RX ORDER — ORAL SEMAGLUTIDE 3 MG/1
TABLET ORAL
COMMUNITY
Start: 2021-06-15 | End: 2021-09-28

## 2021-09-28 RX ORDER — FLUOROMETHOLONE 0.1 %
SUSPENSION, DROPS(FINAL DOSAGE FORM)(ML) OPHTHALMIC (EYE)
COMMUNITY
End: 2021-09-28 | Stop reason: ALTCHOICE

## 2021-09-28 RX ORDER — LANCETS
EACH MISCELLANEOUS 2 TIMES DAILY
COMMUNITY
Start: 2021-08-11 | End: 2022-01-31

## 2021-09-28 RX ORDER — NEBIVOLOL 10 MG/1
10 TABLET ORAL DAILY
COMMUNITY
End: 2021-09-28

## 2021-09-28 NOTE — ASSESSMENT & PLAN NOTE
History of diabetes–longstanding, not insulin-dependent. Last A1c completed about 6 months back was about 9. She has been on Metformin 500 mg 2 tablets twice daily which she has tolerated well.   Trial of glimepiride 2 mg twice daily–cause severe low suga

## 2021-09-28 NOTE — PATIENT INSTRUCTIONS
Problem List Items Addressed This Visit        Unprioritized    RESOLVED: GI hemorrhage    Hyperlipidemia     Patient has been on rosuvastatin at 40 mg daily which she seems to have tolerated well. She is due for recheck labs–orders provided.          Prim tolerate due to nausea, abdominal discomfort and loss of appetite. Patient has not been taking both the glimepiride and the Rybelsus. She was given a trial of the semaglutide which she does not seem to have started.   Her blood sugars at home have been an Relevant Orders    CARD ECHO 2D DOPPLER (CPT=93306)

## 2021-09-28 NOTE — PROGRESS NOTES
HPI:    Patient ID: Margaret Devi is a 78year old female. New patient, here to establish care.   Was seen by Dr. Sherrill Holstein law in the past.        Immunization History  Administered            Date(s) Administered    Covid-19 Vaccine Moderna 100 mcg/0.5 m current. Review of Systems   Constitutional: Negative. HENT: Negative. Eyes: Negative. Respiratory: Negative. Cardiovascular: Negative. Gastrointestinal: Negative. Endocrine: Negative. Genitourinary: Negative.     Musculoskeletal: normal.      Mouth/Throat:      Pharynx: No oropharyngeal exudate. Eyes:      General: No scleral icterus. Extraocular Movements: Extraocular movements intact.       Conjunctiva/sclera: Conjunctivae normal.      Pupils: Pupils are equal, round, and re tablets twice daily which she has tolerated well. Trial of glimepiride 2 mg twice daily–cause severe low sugar reactions and hence discontinued.   She also has been given a trial of Rybelsus which she could not tolerate due to nausea, abdominal discomfort TO FREE T4 (Completed)    OPHTHALMOLOGY - INTERNAL    Hyperlipidemia     Patient has been on rosuvastatin at 40 mg daily which she seems to have tolerated well. She is due for recheck labs–orders provided.          Primary hypertension     Blood pressure 1 DIRECTIONS AND ADVICE    Orders Placed This Encounter      Comp Metabolic Panel (14)      CBC With Differential With Platelet      Hemoglobin A1C      Lipid Panel      Microalb/Creat Ratio, Random Urine      Urinalysis, Routine      TSH W Reflex To Free T4

## 2021-09-28 NOTE — ASSESSMENT & PLAN NOTE
Blood pressure 120/74, pulse 60, temperature 97.2 °F (36.2 °C), temperature source Other, resp. rate 20, height 5' 9\" (1.753 m), weight 236 lb (107 kg). Blood pressure seems well controlled at this time.   She does have bilateral lower extremity juliann

## 2021-09-28 NOTE — ASSESSMENT & PLAN NOTE
History of vitamin D deficiency in the past, this has not been recently rechecked. Recheck labs ordered.

## 2021-09-28 NOTE — ASSESSMENT & PLAN NOTE
Patient has been on rosuvastatin at 40 mg daily which she seems to have tolerated well. She is due for recheck labs–orders provided.

## 2021-10-11 ENCOUNTER — HOSPITAL ENCOUNTER (OUTPATIENT)
Dept: CV DIAGNOSTICS | Facility: HOSPITAL | Age: 71
Discharge: HOME | End: 2021-10-11
Attending: INTERNAL MEDICINE
Payer: MEDICARE

## 2021-10-11 DIAGNOSIS — R06.00 DOE (DYSPNEA ON EXERTION): ICD-10-CM

## 2021-10-11 DIAGNOSIS — I10 PRIMARY HYPERTENSION: ICD-10-CM

## 2021-10-11 PROCEDURE — 93306 TTE W/DOPPLER COMPLETE: CPT | Performed by: INTERNAL MEDICINE

## 2021-10-14 ENCOUNTER — LAB ENCOUNTER (OUTPATIENT)
Dept: LAB | Facility: HOSPITAL | Age: 71
End: 2021-10-14
Attending: INTERNAL MEDICINE
Payer: MEDICARE

## 2021-10-14 DIAGNOSIS — E55.9 VITAMIN D DEFICIENCY: ICD-10-CM

## 2021-10-14 DIAGNOSIS — E11.22 TYPE 2 DIABETES MELLITUS WITH STAGE 3A CHRONIC KIDNEY DISEASE, WITHOUT LONG-TERM CURRENT USE OF INSULIN (HCC): ICD-10-CM

## 2021-10-14 DIAGNOSIS — N18.31 TYPE 2 DIABETES MELLITUS WITH STAGE 3A CHRONIC KIDNEY DISEASE, WITHOUT LONG-TERM CURRENT USE OF INSULIN (HCC): ICD-10-CM

## 2021-10-14 PROCEDURE — 82043 UR ALBUMIN QUANTITATIVE: CPT

## 2021-10-14 PROCEDURE — 80061 LIPID PANEL: CPT

## 2021-10-14 PROCEDURE — 83036 HEMOGLOBIN GLYCOSYLATED A1C: CPT

## 2021-10-14 PROCEDURE — 80053 COMPREHEN METABOLIC PANEL: CPT

## 2021-10-14 PROCEDURE — 3046F HEMOGLOBIN A1C LEVEL >9.0%: CPT | Performed by: INTERNAL MEDICINE

## 2021-10-14 PROCEDURE — 84443 ASSAY THYROID STIM HORMONE: CPT

## 2021-10-14 PROCEDURE — 36415 COLL VENOUS BLD VENIPUNCTURE: CPT

## 2021-10-14 PROCEDURE — 82570 ASSAY OF URINE CREATININE: CPT

## 2021-10-14 PROCEDURE — 3060F POS MICROALBUMINURIA REV: CPT | Performed by: INTERNAL MEDICINE

## 2021-10-14 PROCEDURE — 3061F NEG MICROALBUMINURIA REV: CPT | Performed by: INTERNAL MEDICINE

## 2021-10-14 PROCEDURE — 81001 URINALYSIS AUTO W/SCOPE: CPT

## 2021-10-14 PROCEDURE — 84439 ASSAY OF FREE THYROXINE: CPT

## 2021-10-14 PROCEDURE — 82607 VITAMIN B-12: CPT

## 2021-10-14 PROCEDURE — 85025 COMPLETE CBC W/AUTO DIFF WBC: CPT

## 2021-10-14 PROCEDURE — 82306 VITAMIN D 25 HYDROXY: CPT

## 2021-10-18 NOTE — PROGRESS NOTES
2D echo Doppler of the heart shows normal heart size and wall thickness. Pumping capacity of the heart looks normal with an ejection fraction of 60 to 60%. There is slight stiffening in the heart muscle most likely related to high blood pressure-grade 2 pinto

## 2021-10-18 NOTE — PROGRESS NOTES
Test for diabetes-hemoglobin A1c slightly better from 11.4-9.5 at this time. This needs to be below 7.5. We will need to set up an appointment to discuss.   Vitamin D levels are low, please start on vitamin D 50,000 units once a week for the next 6 months

## 2021-10-19 ENCOUNTER — OFFICE VISIT (OUTPATIENT)
Dept: INTERNAL MEDICINE CLINIC | Facility: CLINIC | Age: 71
End: 2021-10-19
Payer: MEDICARE

## 2021-10-19 VITALS
TEMPERATURE: 98 F | WEIGHT: 236 LBS | HEART RATE: 62 BPM | BODY MASS INDEX: 34.96 KG/M2 | DIASTOLIC BLOOD PRESSURE: 62 MMHG | SYSTOLIC BLOOD PRESSURE: 130 MMHG | RESPIRATION RATE: 20 BRPM | HEIGHT: 69 IN

## 2021-10-19 DIAGNOSIS — I51.89 DIASTOLIC DYSFUNCTION: Primary | ICD-10-CM

## 2021-10-19 DIAGNOSIS — E55.9 VITAMIN D DEFICIENCY: ICD-10-CM

## 2021-10-19 DIAGNOSIS — I10 PRIMARY HYPERTENSION: ICD-10-CM

## 2021-10-19 DIAGNOSIS — E78.5 HYPERLIPIDEMIA, UNSPECIFIED HYPERLIPIDEMIA TYPE: ICD-10-CM

## 2021-10-19 DIAGNOSIS — E11.22 TYPE 2 DIABETES MELLITUS WITH STAGE 3A CHRONIC KIDNEY DISEASE, WITHOUT LONG-TERM CURRENT USE OF INSULIN (HCC): ICD-10-CM

## 2021-10-19 DIAGNOSIS — N18.31 TYPE 2 DIABETES MELLITUS WITH STAGE 3A CHRONIC KIDNEY DISEASE, WITHOUT LONG-TERM CURRENT USE OF INSULIN (HCC): ICD-10-CM

## 2021-10-19 PROCEDURE — 3078F DIAST BP <80 MM HG: CPT | Performed by: INTERNAL MEDICINE

## 2021-10-19 PROCEDURE — 3075F SYST BP GE 130 - 139MM HG: CPT | Performed by: INTERNAL MEDICINE

## 2021-10-19 PROCEDURE — 3008F BODY MASS INDEX DOCD: CPT | Performed by: INTERNAL MEDICINE

## 2021-10-19 PROCEDURE — 99214 OFFICE O/P EST MOD 30 MIN: CPT | Performed by: INTERNAL MEDICINE

## 2021-10-19 RX ORDER — ERGOCALCIFEROL 1.25 MG/1
50000 CAPSULE ORAL WEEKLY
Qty: 12 CAPSULE | Refills: 1 | Status: SHIPPED | OUTPATIENT
Start: 2021-10-19 | End: 2021-11-18

## 2021-10-19 RX ORDER — SPIRONOLACTONE 25 MG/1
TABLET ORAL
Qty: 90 TABLET | Refills: 1 | Status: SHIPPED | OUTPATIENT
Start: 2021-10-19

## 2021-10-19 NOTE — PROGRESS NOTES
HPI:    Patient ID: Anna Blunt is a 78year old female. Labs    Test for diabetes-hemoglobin A1c slightly better from 11.4-9.5 at this time.  This needs to be below 7.5.  We will need to set up an appointment to discuss.   Vitamin D levels are low, Did not start on semaglutide which was given to her. ). She is compliant with treatment most of the time. Her weight is stable. She is following a diabetic, generally unhealthy, high fiber, low fat/cholesterol and low salt diet.  Meal planning includes avoi Calcium 40 MG Oral Tab Take 40 mg by mouth daily. • lisinopril 40 MG Oral Tab Take 40 mg by mouth daily. • metFORMIN HCl 500 MG Oral Tab Take 1,000 mg by mouth 2 (two) times daily with meals.        Allergies:  Flu Virus Vaccine       OTHER (SEE COM sensory deficit. Motor: No abnormal muscle tone.       Coordination: Coordination normal.      Gait: Gait normal.      Deep Tendon Reflexes: Reflexes normal.   Psychiatric:         Mood and Affect: Mood normal.         Behavior: Behavior normal. spironolactone to 25 mg daily. Relevant Medications    spironolactone 25 MG Oral Tab    Vitamin D deficiency     Restarted on vitamin D 50,000 units once a week for the next 6 months. New prescription provided.          Diastolic dysfunction - Prim

## 2021-10-19 NOTE — PATIENT INSTRUCTIONS
.  Problem List Items Addressed This Visit        Unprioritized    Diastolic dysfunction - Primary     Grade 2 diastolic dysfunction with exertional dyspnea. Unable to tolerate a treadmill stress test and does not want nuclear stress test either.   Referra eating         Relevant Medications    SITagliptin Phosphate (JANUVIA) 50 MG Oral Tab    Vitamin D deficiency     Restarted on vitamin D 50,000 units once a week for the next 6 months. New prescription provided.

## 2021-10-19 NOTE — ASSESSMENT & PLAN NOTE
Blood pressure 130/62, pulse 62, temperature 98.2 °F (36.8 °C), temperature source Other, resp. rate 20, height 5' 9\" (1.753 m), weight 236 lb (107 kg). Blood pressures upon recheck were much lower–100/60.   History of CKD stage III which continues to

## 2021-10-19 NOTE — ASSESSMENT & PLAN NOTE
Grade 2 diastolic dysfunction with exertional dyspnea. Unable to tolerate a treadmill stress test and does not want nuclear stress test either. Referral to cardiology provided.   CT calcium scoring heart scan–call [phone] option #2

## 2021-10-19 NOTE — ASSESSMENT & PLAN NOTE
Lipid panel and liver function test seems stable on rosuvastatin at 40 mg daily. Continue on the same dose of medication.

## 2021-10-19 NOTE — ASSESSMENT & PLAN NOTE
Since her last office visit here on September 28 she has been on Metformin 500 mg twice daily and glimepiride 2 mg half a tablet with lunch.   She did have a few low sugar reactions which most likely is because of mealtime alterations or change in the type

## 2021-10-20 ENCOUNTER — TELEPHONE (OUTPATIENT)
Dept: INTERNAL MEDICINE CLINIC | Facility: CLINIC | Age: 71
End: 2021-10-20

## 2021-10-20 NOTE — TELEPHONE ENCOUNTER
Patient called and advised that Dr. Colletta Cypress was pushed out really far (into Early 2022). She was then suggested to see Dr. Zoya Crow. She called her insurance company and they advised he is covered.  Patient wanted to let  know, in case Dr chatterjee

## 2021-10-20 NOTE — TELEPHONE ENCOUNTER
That is the generic option ,ok to change to onglyza or tradjenta if that is cheaper for thept-please call pharmacy

## 2021-10-20 NOTE — TELEPHONE ENCOUNTER
Patient called and advised that the medication below was $45 Out of Pocket. She was wondering if there is a generic version of this medication that would not be as expensive for her. Please Advise.       Medication in Question:   Medication Quantity R

## 2021-10-21 NOTE — TELEPHONE ENCOUNTER
Pharm states that [place] [facility] [place], onglyza and tradjenta are all the same class of meds and would all be 45$ per 90days. Pt agreed to go ahead the [place] [facility] [place].

## 2021-10-21 NOTE — TELEPHONE ENCOUNTER
Pt states that she did get a sooner appt with Dr. Melany Matt in November. No further action required.

## 2021-11-29 ENCOUNTER — HOSPITAL ENCOUNTER (OUTPATIENT)
Dept: CT IMAGING | Facility: HOSPITAL | Age: 71
Discharge: HOME | End: 2021-11-29
Attending: INTERNAL MEDICINE

## 2021-11-29 DIAGNOSIS — Z13.6 SCREENING FOR HEART DISEASE: ICD-10-CM

## 2021-12-05 ENCOUNTER — LAB ENCOUNTER (OUTPATIENT)
Dept: LAB | Facility: HOSPITAL | Age: 71
End: 2021-12-05
Attending: INTERNAL MEDICINE
Payer: MEDICARE

## 2021-12-05 DIAGNOSIS — E07.9 THYROID DYSFUNCTION: ICD-10-CM

## 2021-12-05 PROCEDURE — 80048 BASIC METABOLIC PNL TOTAL CA: CPT | Performed by: INTERNAL MEDICINE

## 2021-12-05 PROCEDURE — 84481 FREE ASSAY (FT-3): CPT

## 2021-12-05 PROCEDURE — 84443 ASSAY THYROID STIM HORMONE: CPT

## 2021-12-05 PROCEDURE — 36415 COLL VENOUS BLD VENIPUNCTURE: CPT | Performed by: INTERNAL MEDICINE

## 2021-12-05 PROCEDURE — 84439 ASSAY OF FREE THYROXINE: CPT

## 2021-12-06 NOTE — PROGRESS NOTES
CT scan over read which looks at the lung and the tissues around the heart shows multiple lung nodules which will need further evaluation, please schedule an office visit to discuss.

## 2021-12-07 ENCOUNTER — OFFICE VISIT (OUTPATIENT)
Dept: INTERNAL MEDICINE CLINIC | Facility: CLINIC | Age: 71
End: 2021-12-07
Payer: MEDICARE

## 2021-12-07 VITALS
HEART RATE: 68 BPM | SYSTOLIC BLOOD PRESSURE: 114 MMHG | BODY MASS INDEX: 36.14 KG/M2 | TEMPERATURE: 98 F | DIASTOLIC BLOOD PRESSURE: 70 MMHG | RESPIRATION RATE: 20 BRPM | HEIGHT: 69 IN | WEIGHT: 244 LBS

## 2021-12-07 DIAGNOSIS — N18.31 TYPE 2 DIABETES MELLITUS WITH STAGE 3A CHRONIC KIDNEY DISEASE, WITHOUT LONG-TERM CURRENT USE OF INSULIN (HCC): Primary | ICD-10-CM

## 2021-12-07 DIAGNOSIS — E55.9 VITAMIN D DEFICIENCY: ICD-10-CM

## 2021-12-07 DIAGNOSIS — E11.22 TYPE 2 DIABETES MELLITUS WITH STAGE 3A CHRONIC KIDNEY DISEASE, WITHOUT LONG-TERM CURRENT USE OF INSULIN (HCC): Primary | ICD-10-CM

## 2021-12-07 DIAGNOSIS — Z78.0 MENOPAUSE: ICD-10-CM

## 2021-12-07 DIAGNOSIS — Z12.31 VISIT FOR SCREENING MAMMOGRAM: ICD-10-CM

## 2021-12-07 DIAGNOSIS — I10 PRIMARY HYPERTENSION: ICD-10-CM

## 2021-12-07 DIAGNOSIS — R91.1 PULMONARY NODULE: ICD-10-CM

## 2021-12-07 DIAGNOSIS — E78.5 HYPERLIPIDEMIA, UNSPECIFIED HYPERLIPIDEMIA TYPE: ICD-10-CM

## 2021-12-07 DIAGNOSIS — I51.89 DIASTOLIC DYSFUNCTION: ICD-10-CM

## 2021-12-07 PROCEDURE — 1111F DSCHRG MED/CURRENT MED MERGE: CPT | Performed by: INTERNAL MEDICINE

## 2021-12-07 PROCEDURE — 3078F DIAST BP <80 MM HG: CPT | Performed by: INTERNAL MEDICINE

## 2021-12-07 PROCEDURE — 99214 OFFICE O/P EST MOD 30 MIN: CPT | Performed by: INTERNAL MEDICINE

## 2021-12-07 PROCEDURE — 3008F BODY MASS INDEX DOCD: CPT | Performed by: INTERNAL MEDICINE

## 2021-12-07 PROCEDURE — 3074F SYST BP LT 130 MM HG: CPT | Performed by: INTERNAL MEDICINE

## 2021-12-08 NOTE — ASSESSMENT & PLAN NOTE
Patient is currently on Metformin 500 mg twice daily and Januvia 50 mg daily. She discontinued her glimepiride due to multiple low sugar reactions. Blood sugars on Januvia with Metformin are all below 150. She has not had any low sugar reactions. Advised to continue on the same regimen. We will discontinue the glimepiride at this time. Consider recheck labs in about 3 to 4 months. Keep well-hydrated, drink plenty of fluids.

## 2021-12-13 NOTE — TELEPHONE ENCOUNTER
Refill passed per [facility] protocol, but not refilled due to high level interaction.     Requested Prescriptions   Pending Prescriptions Disp Refills    LISINOPRIL 40 MG Oral Tab [Pharmacy Med Name: Lisinopril 40 Mg Tab Solc] 90 tablet 0     Sig: OTF

## 2021-12-15 NOTE — TELEPHONE ENCOUNTER
Patient is calling to check status of refill. She is down to last pill today. Please call back when refill is sent to pharmacy.

## 2021-12-17 RX ORDER — LISINOPRIL 40 MG/1
40 TABLET ORAL DAILY
Qty: 90 TABLET | Refills: 1 | Status: SHIPPED | OUTPATIENT
Start: 2021-12-17

## 2021-12-21 VITALS — BODY MASS INDEX: 36.43 KG/M2 | WEIGHT: 246 LBS | HEIGHT: 69 IN

## 2021-12-21 RX ORDER — ASPIRIN 81 MG/1
81 TABLET ORAL DAILY
COMMUNITY

## 2021-12-21 RX ORDER — SODIUM CHLORIDE 9 MG/ML
INJECTION, SOLUTION INTRAVENOUS
Status: CANCELLED | OUTPATIENT
Start: 2021-12-22 | End: 2021-12-22

## 2021-12-23 ENCOUNTER — NURSE ONLY (OUTPATIENT)
Dept: LAB | Facility: HOSPITAL | Age: 71
End: 2021-12-23
Attending: INTERNAL MEDICINE
Payer: MEDICARE

## 2021-12-23 ENCOUNTER — HOSPITAL ENCOUNTER (OUTPATIENT)
Dept: GENERAL RADIOLOGY | Facility: HOSPITAL | Age: 71
Discharge: HOME | End: 2021-12-23
Attending: INTERNAL MEDICINE
Payer: MEDICARE

## 2021-12-23 DIAGNOSIS — Z01.818 PRE-OP TESTING: ICD-10-CM

## 2021-12-23 PROCEDURE — 85610 PROTHROMBIN TIME: CPT

## 2021-12-23 PROCEDURE — 71046 X-RAY EXAM CHEST 2 VIEWS: CPT | Performed by: INTERNAL MEDICINE

## 2021-12-23 PROCEDURE — 80048 BASIC METABOLIC PNL TOTAL CA: CPT

## 2021-12-23 PROCEDURE — 36415 COLL VENOUS BLD VENIPUNCTURE: CPT

## 2021-12-23 PROCEDURE — 85027 COMPLETE CBC AUTOMATED: CPT

## 2021-12-28 ENCOUNTER — HOSPITAL ENCOUNTER (OUTPATIENT)
Dept: INTERVENTIONAL RADIOLOGY/VASCULAR | Facility: HOSPITAL | Age: 71
Discharge: HOME | End: 2021-12-28
Attending: INTERNAL MEDICINE
Payer: MEDICARE

## 2021-12-28 DIAGNOSIS — Z01.818 PRE-OP TESTING: Primary | ICD-10-CM

## 2021-12-29 NOTE — TELEPHONE ENCOUNTER
Patient requesting refills on the following medications. She mentioned that Dr. Sharyle Mealing changed her dosage on metformin to twice a day. She wanted to make sure that was noted.      Metformin 500 mg  Felodipine 10 mg

## 2021-12-30 RX ORDER — FELODIPINE 10 MG/1
10 TABLET, EXTENDED RELEASE ORAL NIGHTLY
Qty: 90 TABLET | Refills: 1 | Status: SHIPPED | OUTPATIENT
Start: 2021-12-30

## 2021-12-30 NOTE — TELEPHONE ENCOUNTER
Patient is currently on Metformin 500 mg twice daily and Januvia 50 mg daily. She discontinued her glimepiride due to multiple low sugar reactions. Blood sugars on Januvia with Metformin are all below 150. She has not had any low sugar reactions.

## 2022-01-03 ENCOUNTER — HOSPITAL ENCOUNTER (OUTPATIENT)
Dept: ULTRASOUND IMAGING | Facility: HOSPITAL | Age: 72
Discharge: HOME | End: 2022-01-03
Attending: INTERNAL MEDICINE

## 2022-01-03 DIAGNOSIS — Z13.6 SCREENING FOR CARDIOVASCULAR CONDITION: ICD-10-CM

## 2022-01-04 ENCOUNTER — LAB ENCOUNTER (OUTPATIENT)
Dept: LAB | Facility: HOSPITAL | Age: 72
End: 2022-01-04
Attending: INTERNAL MEDICINE
Payer: MEDICARE

## 2022-01-04 DIAGNOSIS — Z01.818 PRE-OP TESTING: ICD-10-CM

## 2022-01-05 LAB — SARS-COV-2 RNA RESP QL NAA+PROBE: NOT DETECTED

## 2022-01-07 ENCOUNTER — HOSPITAL ENCOUNTER (OUTPATIENT)
Dept: INTERVENTIONAL RADIOLOGY/VASCULAR | Facility: HOSPITAL | Age: 72
Discharge: HOME | End: 2022-01-07
Attending: INTERNAL MEDICINE | Admitting: INTERNAL MEDICINE
Payer: MEDICARE

## 2022-01-07 VITALS
WEIGHT: 246 LBS | OXYGEN SATURATION: 99 % | SYSTOLIC BLOOD PRESSURE: 108 MMHG | TEMPERATURE: 98 F | RESPIRATION RATE: 14 BRPM | BODY MASS INDEX: 36.43 KG/M2 | DIASTOLIC BLOOD PRESSURE: 57 MMHG | HEART RATE: 82 BPM | HEIGHT: 69 IN

## 2022-01-07 DIAGNOSIS — R06.02 SOB (SHORTNESS OF BREATH): ICD-10-CM

## 2022-01-07 DIAGNOSIS — E11.9 DM (DIABETES MELLITUS), TYPE 2 (HCC): ICD-10-CM

## 2022-01-07 DIAGNOSIS — I50.31 ACUTE HEART FAILURE WITH PRESERVED EJECTION FRACTION (HFPEF) (HCC): ICD-10-CM

## 2022-01-07 DIAGNOSIS — R93.1 ABNORMAL FINDINGS ON DIAGNOSTIC IMAGING OF HEART AND CORONARY CIRCULATION: ICD-10-CM

## 2022-01-07 DIAGNOSIS — Z01.818 PRE-OP TESTING: Primary | ICD-10-CM

## 2022-01-07 LAB
GLUCOSE BLDC GLUCOMTR-MCNC: 206 MG/DL (ref 70–99)
ISTAT ACTIVATED CLOTTING TIME: 273 SECONDS (ref 125–137)

## 2022-01-07 PROCEDURE — 4A023N7 MEASUREMENT OF CARDIAC SAMPLING AND PRESSURE, LEFT HEART, PERCUTANEOUS APPROACH: ICD-10-PCS | Performed by: INTERNAL MEDICINE

## 2022-01-07 PROCEDURE — 36415 COLL VENOUS BLD VENIPUNCTURE: CPT

## 2022-01-07 PROCEDURE — 85347 COAGULATION TIME ACTIVATED: CPT

## 2022-01-07 PROCEDURE — B2111ZZ FLUOROSCOPY OF MULTIPLE CORONARY ARTERIES USING LOW OSMOLAR CONTRAST: ICD-10-PCS | Performed by: INTERNAL MEDICINE

## 2022-01-07 PROCEDURE — 93458 L HRT ARTERY/VENTRICLE ANGIO: CPT

## 2022-01-07 PROCEDURE — 027135Z DILATION OF CORONARY ARTERY, TWO ARTERIES WITH TWO DRUG-ELUTING INTRALUMINAL DEVICES, PERCUTANEOUS APPROACH: ICD-10-PCS | Performed by: INTERNAL MEDICINE

## 2022-01-07 PROCEDURE — 99152 MOD SED SAME PHYS/QHP 5/>YRS: CPT

## 2022-01-07 PROCEDURE — 99153 MOD SED SAME PHYS/QHP EA: CPT

## 2022-01-07 PROCEDURE — 82962 GLUCOSE BLOOD TEST: CPT

## 2022-01-07 RX ORDER — HEPARIN SODIUM 1000 [USP'U]/ML
INJECTION, SOLUTION INTRAVENOUS; SUBCUTANEOUS
Status: COMPLETED
Start: 2022-01-07 | End: 2022-01-07

## 2022-01-07 RX ORDER — CLOPIDOGREL BISULFATE 75 MG/1
TABLET ORAL
Status: COMPLETED
Start: 2022-01-07 | End: 2022-01-07

## 2022-01-07 RX ORDER — SODIUM CHLORIDE 9 MG/ML
INJECTION, SOLUTION INTRAVENOUS
Status: DISCONTINUED | OUTPATIENT
Start: 2022-01-07 | End: 2022-01-07

## 2022-01-07 RX ORDER — CLOPIDOGREL BISULFATE 75 MG/1
75 TABLET ORAL DAILY
Status: DISCONTINUED | OUTPATIENT
Start: 2022-01-08 | End: 2022-01-07

## 2022-01-07 RX ORDER — VERAPAMIL HYDROCHLORIDE 2.5 MG/ML
INJECTION, SOLUTION INTRAVENOUS
Status: COMPLETED
Start: 2022-01-07 | End: 2022-01-07

## 2022-01-07 RX ORDER — LIDOCAINE HYDROCHLORIDE 20 MG/ML
INJECTION, SOLUTION EPIDURAL; INFILTRATION; INTRACAUDAL; PERINEURAL
Status: COMPLETED
Start: 2022-01-07 | End: 2022-01-07

## 2022-01-07 RX ORDER — CLOPIDOGREL BISULFATE 75 MG/1
75 TABLET ORAL DAILY
Qty: 90 TABLET | Refills: 3 | Status: SHIPPED | OUTPATIENT
Start: 2022-01-08

## 2022-01-07 RX ORDER — NITROGLYCERIN 20 MG/100ML
INJECTION INTRAVENOUS
Status: DISCONTINUED
Start: 2022-01-07 | End: 2022-01-07 | Stop reason: WASHOUT

## 2022-01-07 RX ORDER — NITROGLYCERIN 20 MG/100ML
INJECTION INTRAVENOUS
Status: COMPLETED
Start: 2022-01-07 | End: 2022-01-07

## 2022-01-07 RX ORDER — MIDAZOLAM HYDROCHLORIDE 1 MG/ML
INJECTION INTRAMUSCULAR; INTRAVENOUS
Status: COMPLETED
Start: 2022-01-07 | End: 2022-01-07

## 2022-01-07 NOTE — DIETARY NOTE
NUTRITION EDUCATION NOTE    Received consult for nutrition education per cardiac rehab order set. Encouraged follow up in [facility] for elevated A1C>9% 10/14/21. Appropriate education and handout(s) provided.  See education section of Epic for

## 2022-01-07 NOTE — PROCEDURES
[facility] HOSP - [facility]    Cardiac Cath Procedure Note  Mario Lakhani Patient Status:  Outpatient in a Bed    1950 MRN [id number]   Location [facility] Attending Jj Bergeron MD   Hosp Day # 0 YOAN TERRELL diagonal  Normal ejection fraction  Diabetes  Hypertension      Recommendations:  DAPT: Aspirin and Plavix  Continue Crestor 40  No changes to blood pressure medications or diabetes medications      Summary of Case: After written informed consent was Ulisses Baker

## 2022-01-07 NOTE — CARDIAC REHAB
Cardiac Rehab Phase I    Activity:  Distance   Assistance needed   Patient tolerated activity . Education:  Handouts provided and reviewed: [address] Diet: Healthy Cardiac diet reviewed.     Disease Process: Disease process rev

## 2022-01-18 ENCOUNTER — HOSPITAL ENCOUNTER (OUTPATIENT)
Dept: CT IMAGING | Facility: HOSPITAL | Age: 72
Discharge: HOME | End: 2022-01-18
Attending: INTERNAL MEDICINE
Payer: MEDICARE

## 2022-01-18 DIAGNOSIS — R91.1 PULMONARY NODULE: ICD-10-CM

## 2022-01-18 PROCEDURE — 71260 CT THORAX DX C+: CPT | Performed by: INTERNAL MEDICINE

## 2022-01-20 RX ORDER — CARVEDILOL 25 MG/1
TABLET ORAL
Qty: 180 TABLET | Refills: 1 | Status: SHIPPED | OUTPATIENT
Start: 2022-01-20

## 2022-01-20 NOTE — TELEPHONE ENCOUNTER
Refill passed per [facility] protocol    Requested Prescriptions   Pending Prescriptions Disp Refills    CARVEDILOL 25 MG Oral Tab [Pharmacy Med Name: Carvedilol 25 Mg Tab Auro] 180 tablet 0     Sig: TAKE ONE TABLET BY MOUTH TWICE A DAY WITH MEALS

## 2022-01-31 RX ORDER — LANCETS
EACH MISCELLANEOUS
Qty: 100 EACH | Refills: 3 | Status: SHIPPED | OUTPATIENT
Start: 2022-01-31

## 2022-02-01 RX ORDER — ROSUVASTATIN CALCIUM 40 MG/1
TABLET, COATED ORAL
Qty: 90 TABLET | Refills: 0 | Status: SHIPPED | OUTPATIENT
Start: 2022-02-01

## 2022-02-01 NOTE — TELEPHONE ENCOUNTER
Please review. Protocol failed / No protocol. Medication never ordered by provider.  Routing to provider for consideration  Requested Prescriptions   Pending Prescriptions Disp Refills    ROSUVASTATIN 40 MG Oral Tab [Pharmacy Med Name: Rosuvastatin Calcium 40 Mg Tab Nort] 90 tablet 0     Sig: TAKE ONE TABLET BY MOUTH DAILY        Cholesterol Medication Protocol Passed - 1/31/2022  9:06 AM        Passed - ALT in past 12 months        Passed - LDL in past 12 months        Passed - Last ALT < 80       Lab Results   Component Value Date    ALT 16 10/14/2021             Passed - Last LDL < 130     Lab Results   Component Value Date    LDL 82 10/14/2021               Passed - Appointment in past 12 or next 3 months           ACCU-CHEK SOFTCLIX LANCETS Does not apply Misc [Pharmacy Med Name: Accu-Chek Softclix Lancets Mis Roch]  0     Sig: TEST BLOOD GLUCOSE ONCE DAILY        Diabetic Supplies Protocol Passed - 1/31/2022  9:06 AM        Passed - Appointment in past 12 or next 3 months             Recent Outpatient Visits              1 month ago Pre-op testing    [facility]    Nurse Only    1 month ago Type 2 diabetes mellitus with stage 3a chronic kidney disease, without long-term current use of insulin ([address]    [facility], [address], Nicholas Salu MD    Office Visit    3 months ago Diastolic dysfunction    [facility], Nicholas Saul MD    Office Visit    4 months ago Type 2 diabetes mellitus with stage 3a chronic kidney disease, without long-term current use of insulin [facility])    [address] [place] [address], [address], Nicholas Saul MD    Office Visit           Future Appointments         Provider Department Appt Notes    In 1 week Drew Child DO [address], [facility] 84 Pulmonary nodule    In 2 weeks [facility] THE [place] [address] for Health     In 2 weeks [facility] [address] [facility] [employer] [address] pt indicates that she's had a calvin around Oct 2019 w/ other establishment / unable to get scans    In 1 month Gabriel Manuel MD [facility], [address] [place] [address] follow up    In 1 month Libby Michael MD [facility] for Health Ophthalmology NP,RAJAT EE

## 2022-02-08 ENCOUNTER — OFFICE VISIT (OUTPATIENT)
Dept: PULMONOLOGY | Facility: CLINIC | Age: 72
End: 2022-02-08
Payer: MEDICARE

## 2022-02-08 VITALS
OXYGEN SATURATION: 99 % | HEIGHT: 69 IN | HEART RATE: 65 BPM | BODY MASS INDEX: 36.14 KG/M2 | DIASTOLIC BLOOD PRESSURE: 67 MMHG | RESPIRATION RATE: 16 BRPM | SYSTOLIC BLOOD PRESSURE: 124 MMHG | WEIGHT: 244 LBS

## 2022-02-08 DIAGNOSIS — R91.8 LUNG NODULES: Primary | ICD-10-CM

## 2022-02-08 PROCEDURE — 3078F DIAST BP <80 MM HG: CPT | Performed by: INTERNAL MEDICINE

## 2022-02-08 PROCEDURE — 99204 OFFICE O/P NEW MOD 45 MIN: CPT | Performed by: INTERNAL MEDICINE

## 2022-02-08 PROCEDURE — 3074F SYST BP LT 130 MM HG: CPT | Performed by: INTERNAL MEDICINE

## 2022-02-08 PROCEDURE — 3008F BODY MASS INDEX DOCD: CPT | Performed by: INTERNAL MEDICINE

## 2022-02-15 ENCOUNTER — HOSPITAL ENCOUNTER (OUTPATIENT)
Dept: BONE DENSITY | Facility: HOSPITAL | Age: 72
Discharge: HOME | End: 2022-02-15
Attending: INTERNAL MEDICINE
Payer: MEDICARE

## 2022-02-15 ENCOUNTER — HOSPITAL ENCOUNTER (OUTPATIENT)
Dept: MAMMOGRAPHY | Facility: HOSPITAL | Age: 72
Discharge: HOME | End: 2022-02-15
Attending: INTERNAL MEDICINE
Payer: MEDICARE

## 2022-02-15 DIAGNOSIS — Z12.31 VISIT FOR SCREENING MAMMOGRAM: ICD-10-CM

## 2022-02-15 DIAGNOSIS — Z78.0 MENOPAUSE: ICD-10-CM

## 2022-02-15 PROCEDURE — 77063 BREAST TOMOSYNTHESIS BI: CPT | Performed by: INTERNAL MEDICINE

## 2022-02-15 PROCEDURE — 77080 DXA BONE DENSITY AXIAL: CPT | Performed by: INTERNAL MEDICINE

## 2022-02-15 PROCEDURE — 77067 SCR MAMMO BI INCL CAD: CPT | Performed by: INTERNAL MEDICINE

## 2022-03-06 ENCOUNTER — LAB ENCOUNTER (OUTPATIENT)
Dept: LAB | Facility: HOSPITAL | Age: 72
End: 2022-03-06
Attending: INTERNAL MEDICINE
Payer: MEDICARE

## 2022-03-06 DIAGNOSIS — N18.31 TYPE 2 DIABETES MELLITUS WITH STAGE 3A CHRONIC KIDNEY DISEASE, WITHOUT LONG-TERM CURRENT USE OF INSULIN (HCC): ICD-10-CM

## 2022-03-06 DIAGNOSIS — E11.22 TYPE 2 DIABETES MELLITUS WITH STAGE 3A CHRONIC KIDNEY DISEASE, WITHOUT LONG-TERM CURRENT USE OF INSULIN (HCC): ICD-10-CM

## 2022-03-06 LAB
ALBUMIN SERPL-MCNC: 3.5 G/DL (ref 3.4–5)
ALBUMIN/GLOB SERPL: 1 {RATIO} (ref 1–2)
ALP LIVER SERPL-CCNC: 82 U/L
ALT SERPL-CCNC: 16 U/L
ANION GAP SERPL CALC-SCNC: 5 MMOL/L (ref 0–18)
AST SERPL-CCNC: 12 U/L (ref 15–37)
BASOPHILS # BLD AUTO: 0.04 X10(3) UL (ref 0–0.2)
BASOPHILS NFR BLD AUTO: 0.6 %
BILIRUB SERPL-MCNC: 0.3 MG/DL (ref 0.1–2)
BILIRUB UR QL: NEGATIVE
BUN BLD-MCNC: 36 MG/DL (ref 7–18)
BUN/CREAT SERPL: 25.4 (ref 10–20)
CALCIUM BLD-MCNC: 8.8 MG/DL (ref 8.5–10.1)
CHLORIDE SERPL-SCNC: 106 MMOL/L (ref 98–112)
CHOLEST SERPL-MCNC: 119 MG/DL (ref ?–200)
CO2 SERPL-SCNC: 29 MMOL/L (ref 21–32)
COLOR UR: YELLOW
CREAT BLD-MCNC: 1.42 MG/DL
CREAT UR-SCNC: 219 MG/DL
DEPRECATED RDW RBC AUTO: 50.3 FL (ref 35.1–46.3)
EOSINOPHIL # BLD AUTO: 0.22 X10(3) UL (ref 0–0.7)
EOSINOPHIL NFR BLD AUTO: 3.3 %
ERYTHROCYTE [DISTWIDTH] IN BLOOD BY AUTOMATED COUNT: 14.4 % (ref 11–15)
EST. AVERAGE GLUCOSE BLD GHB EST-MCNC: 180 MG/DL (ref 68–126)
FASTING PATIENT LIPID ANSWER: YES
FASTING STATUS PATIENT QL REPORTED: YES
GLOBULIN PLAS-MCNC: 3.5 G/DL (ref 2.8–4.4)
GLUCOSE BLD-MCNC: 155 MG/DL (ref 70–99)
GLUCOSE UR-MCNC: NEGATIVE MG/DL
HBA1C MFR BLD: 7.9 % (ref ?–5.7)
HCT VFR BLD AUTO: 35.8 %
HDLC SERPL-MCNC: 52 MG/DL (ref 40–59)
HGB BLD-MCNC: 11.1 G/DL
HGB UR QL STRIP.AUTO: NEGATIVE
IMM GRANULOCYTES # BLD AUTO: 0.02 X10(3) UL (ref 0–1)
IMM GRANULOCYTES NFR BLD: 0.3 %
KETONES UR-MCNC: NEGATIVE MG/DL
LDLC SERPL CALC-MCNC: 52 MG/DL (ref ?–100)
LEUKOCYTE ESTERASE UR QL STRIP.AUTO: NEGATIVE
LYMPHOCYTES # BLD AUTO: 1.67 X10(3) UL (ref 1–4)
LYMPHOCYTES NFR BLD AUTO: 24.9 %
MCH RBC QN AUTO: 29.2 PG (ref 26–34)
MCHC RBC AUTO-ENTMCNC: 31 G/DL (ref 31–37)
MCV RBC AUTO: 94.2 FL
MICROALBUMIN UR-MCNC: 15.5 MG/DL
MICROALBUMIN/CREAT 24H UR-RTO: 70.8 UG/MG (ref ?–30)
MONOCYTES # BLD AUTO: 0.54 X10(3) UL (ref 0.1–1)
MONOCYTES NFR BLD AUTO: 8 %
NEUTROPHILS # BLD AUTO: 4.23 X10 (3) UL (ref 1.5–7.7)
NEUTROPHILS # BLD AUTO: 4.23 X10(3) UL (ref 1.5–7.7)
NEUTROPHILS NFR BLD AUTO: 62.9 %
NITRITE UR QL STRIP.AUTO: NEGATIVE
NONHDLC SERPL-MCNC: 67 MG/DL (ref ?–130)
OSMOLALITY SERPL CALC.SUM OF ELEC: 301 MOSM/KG (ref 275–295)
PH UR: 5 [PH] (ref 5–8)
PLATELET # BLD AUTO: 219 10(3)UL (ref 150–450)
POTASSIUM SERPL-SCNC: 4.2 MMOL/L (ref 3.5–5.1)
PROT SERPL-MCNC: 7 G/DL (ref 6.4–8.2)
PROT UR-MCNC: 100 MG/DL
RBC # BLD AUTO: 3.8 X10(6)UL
SODIUM SERPL-SCNC: 140 MMOL/L (ref 136–145)
SP GR UR STRIP: 1.02 (ref 1–1.03)
TRIGL SERPL-MCNC: 70 MG/DL (ref 30–149)
UROBILINOGEN UR STRIP-ACNC: <2
VIT C UR-MCNC: 40 MG/DL
VLDLC SERPL CALC-MCNC: 10 MG/DL (ref 0–30)
WBC # BLD AUTO: 6.7 X10(3) UL (ref 4–11)

## 2022-03-06 PROCEDURE — 81001 URINALYSIS AUTO W/SCOPE: CPT

## 2022-03-06 PROCEDURE — 36415 COLL VENOUS BLD VENIPUNCTURE: CPT

## 2022-03-06 PROCEDURE — 82570 ASSAY OF URINE CREATININE: CPT

## 2022-03-06 PROCEDURE — 83036 HEMOGLOBIN GLYCOSYLATED A1C: CPT

## 2022-03-06 PROCEDURE — 3051F HG A1C>EQUAL 7.0%<8.0%: CPT | Performed by: INTERNAL MEDICINE

## 2022-03-06 PROCEDURE — 3061F NEG MICROALBUMINURIA REV: CPT | Performed by: INTERNAL MEDICINE

## 2022-03-06 PROCEDURE — 80061 LIPID PANEL: CPT

## 2022-03-06 PROCEDURE — 85025 COMPLETE CBC W/AUTO DIFF WBC: CPT

## 2022-03-06 PROCEDURE — 82043 UR ALBUMIN QUANTITATIVE: CPT

## 2022-03-06 PROCEDURE — 80053 COMPREHEN METABOLIC PANEL: CPT

## 2022-03-06 PROCEDURE — 3060F POS MICROALBUMINURIA REV: CPT | Performed by: INTERNAL MEDICINE

## 2022-03-07 NOTE — PROGRESS NOTES
Please ensure that this patient is transferred to PCP in the system as I will be leaving the practice.

## 2022-03-08 ENCOUNTER — OFFICE VISIT (OUTPATIENT)
Dept: INTERNAL MEDICINE CLINIC | Facility: CLINIC | Age: 72
End: 2022-03-08
Payer: MEDICARE

## 2022-03-08 VITALS
BODY MASS INDEX: 35.99 KG/M2 | HEART RATE: 60 BPM | HEIGHT: 69 IN | WEIGHT: 243 LBS | DIASTOLIC BLOOD PRESSURE: 74 MMHG | TEMPERATURE: 97 F | SYSTOLIC BLOOD PRESSURE: 118 MMHG | RESPIRATION RATE: 20 BRPM

## 2022-03-08 DIAGNOSIS — D50.0 IRON DEFICIENCY ANEMIA DUE TO CHRONIC BLOOD LOSS: ICD-10-CM

## 2022-03-08 DIAGNOSIS — E78.5 HYPERLIPIDEMIA, UNSPECIFIED HYPERLIPIDEMIA TYPE: ICD-10-CM

## 2022-03-08 DIAGNOSIS — N18.31 TYPE 2 DIABETES MELLITUS WITH STAGE 3A CHRONIC KIDNEY DISEASE, WITHOUT LONG-TERM CURRENT USE OF INSULIN (HCC): Primary | ICD-10-CM

## 2022-03-08 DIAGNOSIS — Z95.820 S/P ANGIOPLASTY WITH STENT: ICD-10-CM

## 2022-03-08 DIAGNOSIS — R91.1 PULMONARY NODULE: ICD-10-CM

## 2022-03-08 DIAGNOSIS — N18.30 CKD STAGE 3 DUE TO TYPE 2 DIABETES MELLITUS (HCC): ICD-10-CM

## 2022-03-08 DIAGNOSIS — E11.22 CKD STAGE 3 DUE TO TYPE 2 DIABETES MELLITUS (HCC): ICD-10-CM

## 2022-03-08 DIAGNOSIS — E11.22 TYPE 2 DIABETES MELLITUS WITH STAGE 3A CHRONIC KIDNEY DISEASE, WITHOUT LONG-TERM CURRENT USE OF INSULIN (HCC): Primary | ICD-10-CM

## 2022-03-08 DIAGNOSIS — I10 PRIMARY HYPERTENSION: ICD-10-CM

## 2022-03-08 PROCEDURE — 3008F BODY MASS INDEX DOCD: CPT | Performed by: INTERNAL MEDICINE

## 2022-03-08 PROCEDURE — 3074F SYST BP LT 130 MM HG: CPT | Performed by: INTERNAL MEDICINE

## 2022-03-08 PROCEDURE — 3078F DIAST BP <80 MM HG: CPT | Performed by: INTERNAL MEDICINE

## 2022-03-08 PROCEDURE — 99214 OFFICE O/P EST MOD 30 MIN: CPT | Performed by: INTERNAL MEDICINE

## 2022-03-08 RX ORDER — FELODIPINE 10 MG/1
10 TABLET, EXTENDED RELEASE ORAL NIGHTLY
Qty: 90 TABLET | Refills: 1 | Status: SHIPPED | OUTPATIENT
Start: 2022-03-08

## 2022-03-08 RX ORDER — ERGOCALCIFEROL 1.25 MG/1
50000 CAPSULE ORAL WEEKLY
COMMUNITY
Start: 2022-01-06 | End: 2022-03-08

## 2022-03-08 RX ORDER — ERGOCALCIFEROL (VITAMIN D2) 1250 MCG
1 CAPSULE ORAL WEEKLY
COMMUNITY
Start: 2021-10-29

## 2022-03-08 NOTE — ASSESSMENT & PLAN NOTE
Patient is currently on Januvia 50 mg daily and Metformin 500 mg twice daily. Blood sugars seem much improved. Hemoglobin A1c is at 7.9. Monitor at this time. Her glimepiride was discontinued due to significant low sugar reactions. Lipid panel stable on rosuvastatin. Kidney functions remain low, will consider nephrology evaluation at her next office visit.

## 2022-03-08 NOTE — ASSESSMENT & PLAN NOTE
Lipid panel and liver function tests seems stable on rosuvastatin 40 mg daily. Continue on the same dose of medication and follow-up with labs.

## 2022-03-22 ENCOUNTER — OFFICE VISIT (OUTPATIENT)
Dept: OPHTHALMOLOGY | Facility: CLINIC | Age: 72
End: 2022-03-22
Payer: MEDICARE

## 2022-03-22 DIAGNOSIS — Z96.1 PSEUDOPHAKIA OF RIGHT EYE: ICD-10-CM

## 2022-03-22 DIAGNOSIS — H25.12 AGE-RELATED NUCLEAR CATARACT OF LEFT EYE: ICD-10-CM

## 2022-03-22 DIAGNOSIS — E11.9 DIABETES MELLITUS TYPE 2 WITHOUT RETINOPATHY (HCC): Primary | ICD-10-CM

## 2022-03-22 PROCEDURE — 2023F DILAT RTA XM W/O RTNOPTHY: CPT | Performed by: OPHTHALMOLOGY

## 2022-03-22 PROCEDURE — 92015 DETERMINE REFRACTIVE STATE: CPT | Performed by: OPHTHALMOLOGY

## 2022-03-22 PROCEDURE — 92004 COMPRE OPH EXAM NEW PT 1/>: CPT | Performed by: OPHTHALMOLOGY

## 2022-03-22 NOTE — PATIENT INSTRUCTIONS
Diabetes mellitus type 2 without retinopathy ([facility] [address]  Diabetes type II: no background of retinopathy, no signs of neovascularization noted. Discussed ocular and systemic benefits of blood sugar control. Diagnosis and treatment discussed in detail with patient. Pseudophakia of right eye  No treatment. Age-related nuclear cataract of left eye  No treatment is needed on mild cataract in the left eye. New glasses today; update as needed. Discussed that new prescription is only a slight change.

## 2022-03-22 NOTE — ASSESSMENT & PLAN NOTE
No treatment is needed on mild cataract in the left eye. New glasses today; update as needed. Discussed that new prescription is only a slight change.

## 2022-04-05 ENCOUNTER — OFFICE VISIT (OUTPATIENT)
Dept: NEPHROLOGY | Facility: CLINIC | Age: 72
End: 2022-04-05
Payer: MEDICARE

## 2022-04-05 VITALS
SYSTOLIC BLOOD PRESSURE: 101 MMHG | HEART RATE: 64 BPM | HEIGHT: 69 IN | WEIGHT: 247 LBS | BODY MASS INDEX: 36.58 KG/M2 | DIASTOLIC BLOOD PRESSURE: 54 MMHG

## 2022-04-05 DIAGNOSIS — N18.32 STAGE 3B CHRONIC KIDNEY DISEASE (HCC): Primary | ICD-10-CM

## 2022-04-05 PROCEDURE — 99205 OFFICE O/P NEW HI 60 MIN: CPT | Performed by: INTERNAL MEDICINE

## 2022-04-05 PROCEDURE — 3074F SYST BP LT 130 MM HG: CPT | Performed by: INTERNAL MEDICINE

## 2022-04-05 PROCEDURE — 3078F DIAST BP <80 MM HG: CPT | Performed by: INTERNAL MEDICINE

## 2022-04-05 PROCEDURE — 3008F BODY MASS INDEX DOCD: CPT | Performed by: INTERNAL MEDICINE

## 2022-04-05 NOTE — PROGRESS NOTES
04/05/22        Patient: Darío Sosa   YOB: 1950   Date of Visit: 4/5/2022       Dear  Dr. Frankie Lopez MD,      Thank you for referring Darío Sosa to my practice. Please find my assessment and plan below. As you know she is a 37-year-old -American female with a history of adult onset diabetes mellitus, hypertension, coronary artery disease who I now had the pleasure of seeing for evaluation of chronic kidney disease stage III. Laboratory studies were reviewed in epic. She had a creatinine 0.70 back in June 2020. In October 2021 her creatinine was 1.17 and on December 23, 2021 her creatinine is 1.23. Finally on March 6, 2022 her creatinine is 1.42 with an estimated GFR of 43 cc/min and renal consultation has now been called. Of note she did receive a CT scan with contrast on January 18, 2022. Her past medical history is significant for hypertension at least 10 to 15 years. She has had some trauma to the eye but is not aware of any actual diabetic retinopathy or peripheral neuropathy. She has been hypertensive for at least 30 years. She had 2 coronary artery stents placed in January 2022. Medications are as listed. Denies any significant use of nonsteroidals. Social history she is a non-smoker. Family history is positive for hypertension diabetes but negative for renal pathology. Review of system patient otherwise states she is doing well without any chest pain, shortness of breath, GI or urinary tract symptoms. 10 point review of systems is otherwise unremarkable. On physical exam her blood pressure was 101/54 with a pulse of 64 and she weighed 247 pounds. She states her blood pressure readings at home are usually more in the 034 systolic range. Her neck was supple without JVD. Lungs were clear. Heart revealed a regular rate and rhythm with an S4 but no gallops, murmurs or rubs. Abdomen was soft, flat, nontender without organomegaly, masses or bruits. Extremities revealed no edema. Laboratory studies as stated above. Urine for microalbumin was only mildly elevated at 71 and her A1c was 7.9. I therefore informed the patient and her daughter that she has chronic kidney disease stage III. This most likely secondary to hypertension and/or diabetic nephropathy. Other causes need to be excluded. For completion sake a repeat CBC, renal panel, sed rate, connective tissue profile, random urine for Bence-Sotelo protein and a renal ultrasound have been ordered. Further impressions and recommendations will be forthcoming after reviewing the above. Reinforced importance of maintaining adequate hydration. Avoid nonsteroidals. She knows that she is at risk for contrast-induced nephropathy. Thank you very much for allowing me to participate in the care of your patient. If you have any questions please feel free to call.            Sincerely,   Ofe Lowe MD   [address], [address] [place] [address]  [address] [facility] [address]  [address] [facility] [address] [address]    Document electronically generated by:  Ofe Lowe MD

## 2022-04-13 ENCOUNTER — ORDER TRANSCRIPTION (OUTPATIENT)
Dept: CARDIAC REHAB | Facility: HOSPITAL | Age: 72
End: 2022-04-13

## 2022-04-17 NOTE — ASSESSMENT & PLAN NOTE
Continue on vitamin D as directed and will follow-up with labs. Problem: Depressed Mood (Adult/Pediatric)  Goal: *STG: Participates in treatment plan  Outcome: Progressing Towards Goal  Goal: *STG: Verbalizes anger, guilt, and other feelings in a constructive manor  Outcome: Progressing Towards Goal  Goal: *STG: Demonstrates reduction in symptoms and increase in insight into coping skills/future focused  Outcome: Progressing Towards Goal     0700: Shift change report given to Mary Ann WHITTEN (oncoming nurse) by Johny Michaels (offgoing nurse). Report included the following information SBAR, Kardex, MAR and Recent Results. 0342-1636: Assumed care of patient. Met patient in dayroom. Calm and cooperative during assessment. Patient denied anxiety, depression, SI, HI, AVH and pain. Medication and meal compliant. [id number]: Patient in dayroom watching TV.     2366-4837: Meal compliant. 2884-1429: No issues. 9728-4582: Med and meal compliant.

## 2022-04-18 ENCOUNTER — CARDPULM VISIT (OUTPATIENT)
Dept: CARDIAC REHAB | Facility: HOSPITAL | Age: 72
End: 2022-04-18
Attending: INTERNAL MEDICINE
Payer: MEDICARE

## 2022-04-18 DIAGNOSIS — Z98.61 S/P PTCA (PERCUTANEOUS TRANSLUMINAL CORONARY ANGIOPLASTY): ICD-10-CM

## 2022-04-20 ENCOUNTER — LAB ENCOUNTER (OUTPATIENT)
Dept: LAB | Facility: HOSPITAL | Age: 72
End: 2022-04-20
Attending: INTERNAL MEDICINE
Payer: MEDICARE

## 2022-04-20 DIAGNOSIS — E11.22 TYPE 2 DIABETES MELLITUS WITH STAGE 3A CHRONIC KIDNEY DISEASE, WITHOUT LONG-TERM CURRENT USE OF INSULIN (HCC): ICD-10-CM

## 2022-04-20 DIAGNOSIS — N18.32 STAGE 3B CHRONIC KIDNEY DISEASE (HCC): ICD-10-CM

## 2022-04-20 DIAGNOSIS — N18.31 TYPE 2 DIABETES MELLITUS WITH STAGE 3A CHRONIC KIDNEY DISEASE, WITHOUT LONG-TERM CURRENT USE OF INSULIN (HCC): ICD-10-CM

## 2022-04-20 LAB
ALBUMIN SERPL-MCNC: 3.6 G/DL (ref 3.4–5)
ALBUMIN/GLOB SERPL: 1 {RATIO} (ref 1–2)
ALP LIVER SERPL-CCNC: 88 U/L
ALT SERPL-CCNC: 18 U/L
ANION GAP SERPL CALC-SCNC: 6 MMOL/L (ref 0–18)
AST SERPL-CCNC: 13 U/L (ref 15–37)
BASOPHILS # BLD AUTO: 0.04 X10(3) UL (ref 0–0.2)
BASOPHILS NFR BLD AUTO: 0.7 %
BILIRUB SERPL-MCNC: 0.3 MG/DL (ref 0.1–2)
BILIRUB UR QL: NEGATIVE
BUN BLD-MCNC: 31 MG/DL (ref 7–18)
BUN/CREAT SERPL: 20 (ref 10–20)
C3 SERPL-MCNC: 141 MG/DL (ref 90–180)
C4 SERPL-MCNC: 29 MG/DL (ref 10–40)
CALCIUM BLD-MCNC: 9.4 MG/DL (ref 8.5–10.1)
CHLORIDE SERPL-SCNC: 105 MMOL/L (ref 98–112)
CLARITY UR: CLEAR
CO2 SERPL-SCNC: 30 MMOL/L (ref 21–32)
COLOR UR: YELLOW
CREAT BLD-MCNC: 1.55 MG/DL
CREAT UR-SCNC: 188 MG/DL
CRP SERPL-MCNC: <0.29 MG/DL (ref ?–0.3)
DEPRECATED RDW RBC AUTO: 50.6 FL (ref 35.1–46.3)
EOSINOPHIL # BLD AUTO: 0.19 X10(3) UL (ref 0–0.7)
EOSINOPHIL NFR BLD AUTO: 3.1 %
ERYTHROCYTE [DISTWIDTH] IN BLOOD BY AUTOMATED COUNT: 14.9 % (ref 11–15)
ERYTHROCYTE [SEDIMENTATION RATE] IN BLOOD: 20 MM/HR
FASTING STATUS PATIENT QL REPORTED: YES
GLOBULIN PLAS-MCNC: 3.6 G/DL (ref 2.8–4.4)
GLUCOSE BLD-MCNC: 161 MG/DL (ref 70–99)
GLUCOSE UR-MCNC: NEGATIVE MG/DL
HCT VFR BLD AUTO: 37.3 %
HGB BLD-MCNC: 11.5 G/DL
HGB UR QL STRIP.AUTO: NEGATIVE
IMM GRANULOCYTES # BLD AUTO: 0.01 X10(3) UL (ref 0–1)
IMM GRANULOCYTES NFR BLD: 0.2 %
KETONES UR-MCNC: NEGATIVE MG/DL
LEUKOCYTE ESTERASE UR QL STRIP.AUTO: NEGATIVE
LYMPHOCYTES # BLD AUTO: 1.59 X10(3) UL (ref 1–4)
LYMPHOCYTES NFR BLD AUTO: 25.9 %
MCH RBC QN AUTO: 28.6 PG (ref 26–34)
MCHC RBC AUTO-ENTMCNC: 30.8 G/DL (ref 31–37)
MCV RBC AUTO: 92.8 FL
MICROALBUMIN UR-MCNC: 40.9 MG/DL
MICROALBUMIN/CREAT 24H UR-RTO: 217.6 UG/MG (ref ?–30)
MONOCYTES # BLD AUTO: 0.51 X10(3) UL (ref 0.1–1)
MONOCYTES NFR BLD AUTO: 8.3 %
NEUTROPHILS # BLD AUTO: 3.81 X10 (3) UL (ref 1.5–7.7)
NEUTROPHILS # BLD AUTO: 3.81 X10(3) UL (ref 1.5–7.7)
NEUTROPHILS NFR BLD AUTO: 61.8 %
NITRITE UR QL STRIP.AUTO: NEGATIVE
OSMOLALITY SERPL CALC.SUM OF ELEC: 302 MOSM/KG (ref 275–295)
PH UR: 6 [PH] (ref 5–8)
PHOSPHATE SERPL-MCNC: 3.3 MG/DL (ref 2.5–4.9)
PLATELET # BLD AUTO: 212 10(3)UL (ref 150–450)
POTASSIUM SERPL-SCNC: 4.1 MMOL/L (ref 3.5–5.1)
PROT SERPL-MCNC: 7.2 G/DL (ref 6.4–8.2)
PROT UR-MCNC: 100 MG/DL
PROT UR-MCNC: 145.9 MG/DL
RBC # BLD AUTO: 4.02 X10(6)UL
RHEUMATOID FACT SERPL-ACNC: <10 IU/ML (ref ?–15)
SODIUM SERPL-SCNC: 141 MMOL/L (ref 136–145)
SP GR UR STRIP: 1.02 (ref 1–1.03)
UROBILINOGEN UR STRIP-ACNC: <2
VIT C UR-MCNC: 40 MG/DL
WBC # BLD AUTO: 6.2 X10(3) UL (ref 4–11)

## 2022-04-20 PROCEDURE — 36415 COLL VENOUS BLD VENIPUNCTURE: CPT

## 2022-04-20 PROCEDURE — 85652 RBC SED RATE AUTOMATED: CPT

## 2022-04-20 PROCEDURE — 86038 ANTINUCLEAR ANTIBODIES: CPT

## 2022-04-20 PROCEDURE — 84100 ASSAY OF PHOSPHORUS: CPT

## 2022-04-20 PROCEDURE — 86160 COMPLEMENT ANTIGEN: CPT

## 2022-04-20 PROCEDURE — 84165 PROTEIN E-PHORESIS SERUM: CPT

## 2022-04-20 PROCEDURE — 85025 COMPLETE CBC W/AUTO DIFF WBC: CPT

## 2022-04-20 PROCEDURE — 86140 C-REACTIVE PROTEIN: CPT

## 2022-04-20 PROCEDURE — 84166 PROTEIN E-PHORESIS/URINE/CSF: CPT

## 2022-04-20 PROCEDURE — 80053 COMPREHEN METABOLIC PANEL: CPT

## 2022-04-20 PROCEDURE — 82043 UR ALBUMIN QUANTITATIVE: CPT

## 2022-04-20 PROCEDURE — 86335 IMMUNFIX E-PHORSIS/URINE/CSF: CPT

## 2022-04-20 PROCEDURE — 86431 RHEUMATOID FACTOR QUANT: CPT

## 2022-04-20 PROCEDURE — 81001 URINALYSIS AUTO W/SCOPE: CPT

## 2022-04-20 PROCEDURE — 82570 ASSAY OF URINE CREATININE: CPT

## 2022-04-20 PROCEDURE — 3060F POS MICROALBUMINURIA REV: CPT | Performed by: INTERNAL MEDICINE

## 2022-04-21 LAB — NUCLEAR IGG TITR SER IF: NEGATIVE {TITER}

## 2022-04-22 LAB
ALBUMIN SERPL ELPH-MCNC: 4 G/DL (ref 3.75–5.21)
ALBUMIN/GLOB SERPL: 1.21 {RATIO} (ref 1–2)
ALPHA1 GLOB SERPL ELPH-MCNC: 0.34 G/DL (ref 0.19–0.46)
ALPHA2 GLOB SERPL ELPH-MCNC: 0.93 G/DL (ref 0.48–1.05)
B-GLOBULIN SERPL ELPH-MCNC: 0.83 G/DL (ref 0.68–1.23)
GAMMA GLOB SERPL ELPH-MCNC: 1.2 G/DL (ref 0.62–1.7)
PROT SERPL-MCNC: 7.3 G/DL (ref 6.4–8.2)

## 2022-04-23 ENCOUNTER — TELEPHONE (OUTPATIENT)
Dept: NEPHROLOGY | Facility: CLINIC | Age: 72
End: 2022-04-23

## 2022-04-23 NOTE — TELEPHONE ENCOUNTER
Labs are in.  Kidney function was a little worse. Please see soon for follow-up after she completes the ultrasound the kidney.   Bring in any recent blood pressure readings

## 2022-04-25 ENCOUNTER — CARDPULM VISIT (OUTPATIENT)
Dept: CARDIAC REHAB | Facility: HOSPITAL | Age: 72
End: 2022-04-25
Attending: INTERNAL MEDICINE
Payer: MEDICARE

## 2022-04-25 NOTE — TELEPHONE ENCOUNTER
Spoke to patient and relayed Dr. Raquel Nunez message as shown below. Patient verbalized understanding of whole message. Patient has US of kidney scheduled on 5/5. F/u appointment scheduled on 5/10. No further action required at this time.

## 2022-04-27 ENCOUNTER — CARDPULM VISIT (OUTPATIENT)
Dept: CARDIAC REHAB | Facility: HOSPITAL | Age: 72
End: 2022-04-27
Attending: INTERNAL MEDICINE
Payer: MEDICARE

## 2022-04-29 ENCOUNTER — CARDPULM VISIT (OUTPATIENT)
Dept: CARDIAC REHAB | Facility: HOSPITAL | Age: 72
End: 2022-04-29
Attending: INTERNAL MEDICINE
Payer: MEDICARE

## 2022-05-02 ENCOUNTER — CARDPULM VISIT (OUTPATIENT)
Dept: CARDIAC REHAB | Facility: HOSPITAL | Age: 72
End: 2022-05-02
Attending: INTERNAL MEDICINE
Payer: MEDICARE

## 2022-05-04 ENCOUNTER — CARDPULM VISIT (OUTPATIENT)
Dept: CARDIAC REHAB | Facility: HOSPITAL | Age: 72
End: 2022-05-04
Attending: INTERNAL MEDICINE
Payer: MEDICARE

## 2022-05-05 ENCOUNTER — HOSPITAL ENCOUNTER (OUTPATIENT)
Dept: ULTRASOUND IMAGING | Facility: HOSPITAL | Age: 72
Discharge: HOME | End: 2022-05-05
Attending: INTERNAL MEDICINE
Payer: MEDICARE

## 2022-05-05 DIAGNOSIS — N18.32 STAGE 3B CHRONIC KIDNEY DISEASE (HCC): ICD-10-CM

## 2022-05-05 PROCEDURE — 76770 US EXAM ABDO BACK WALL COMP: CPT | Performed by: INTERNAL MEDICINE

## 2022-05-06 ENCOUNTER — TELEPHONE (OUTPATIENT)
Dept: NEPHROLOGY | Facility: CLINIC | Age: 72
End: 2022-05-06

## 2022-05-06 ENCOUNTER — CARDPULM VISIT (OUTPATIENT)
Dept: CARDIAC REHAB | Facility: HOSPITAL | Age: 72
End: 2022-05-06
Attending: INTERNAL MEDICINE
Payer: MEDICARE

## 2022-05-09 ENCOUNTER — CARDPULM VISIT (OUTPATIENT)
Dept: CARDIAC REHAB | Facility: HOSPITAL | Age: 72
End: 2022-05-09
Attending: INTERNAL MEDICINE
Payer: MEDICARE

## 2022-05-10 ENCOUNTER — OFFICE VISIT (OUTPATIENT)
Dept: NEPHROLOGY | Facility: CLINIC | Age: 72
End: 2022-05-10
Payer: MEDICARE

## 2022-05-10 VITALS
HEIGHT: 69 IN | DIASTOLIC BLOOD PRESSURE: 61 MMHG | WEIGHT: 244 LBS | HEART RATE: 62 BPM | SYSTOLIC BLOOD PRESSURE: 122 MMHG | BODY MASS INDEX: 36.14 KG/M2

## 2022-05-10 DIAGNOSIS — N18.32 STAGE 3B CHRONIC KIDNEY DISEASE (HCC): Primary | ICD-10-CM

## 2022-05-10 PROCEDURE — 3008F BODY MASS INDEX DOCD: CPT | Performed by: INTERNAL MEDICINE

## 2022-05-10 PROCEDURE — 99213 OFFICE O/P EST LOW 20 MIN: CPT | Performed by: INTERNAL MEDICINE

## 2022-05-10 PROCEDURE — 3078F DIAST BP <80 MM HG: CPT | Performed by: INTERNAL MEDICINE

## 2022-05-10 PROCEDURE — 3074F SYST BP LT 130 MM HG: CPT | Performed by: INTERNAL MEDICINE

## 2022-05-11 ENCOUNTER — CARDPULM VISIT (OUTPATIENT)
Dept: CARDIAC REHAB | Facility: HOSPITAL | Age: 72
End: 2022-05-11
Attending: INTERNAL MEDICINE
Payer: MEDICARE

## 2022-05-13 ENCOUNTER — CARDPULM VISIT (OUTPATIENT)
Dept: CARDIAC REHAB | Facility: HOSPITAL | Age: 72
End: 2022-05-13
Attending: INTERNAL MEDICINE
Payer: MEDICARE

## 2022-05-16 ENCOUNTER — APPOINTMENT (OUTPATIENT)
Dept: CARDIAC REHAB | Facility: HOSPITAL | Age: 72
End: 2022-05-16
Attending: INTERNAL MEDICINE
Payer: MEDICARE

## 2022-05-18 ENCOUNTER — CARDPULM VISIT (OUTPATIENT)
Dept: CARDIAC REHAB | Facility: HOSPITAL | Age: 72
End: 2022-05-18
Attending: INTERNAL MEDICINE
Payer: MEDICARE

## 2022-05-18 PROCEDURE — 93798 PHYS/QHP OP CAR RHAB W/ECG: CPT

## 2022-05-20 ENCOUNTER — CARDPULM VISIT (OUTPATIENT)
Dept: CARDIAC REHAB | Facility: HOSPITAL | Age: 72
End: 2022-05-20
Attending: INTERNAL MEDICINE
Payer: MEDICARE

## 2022-05-20 PROCEDURE — 93798 PHYS/QHP OP CAR RHAB W/ECG: CPT

## 2022-05-23 ENCOUNTER — CARDPULM VISIT (OUTPATIENT)
Dept: CARDIAC REHAB | Facility: HOSPITAL | Age: 72
End: 2022-05-23
Attending: INTERNAL MEDICINE
Payer: MEDICARE

## 2022-05-23 PROCEDURE — 93798 PHYS/QHP OP CAR RHAB W/ECG: CPT

## 2022-05-25 ENCOUNTER — CARDPULM VISIT (OUTPATIENT)
Dept: CARDIAC REHAB | Facility: HOSPITAL | Age: 72
End: 2022-05-25
Attending: INTERNAL MEDICINE
Payer: MEDICARE

## 2022-05-25 PROCEDURE — 93798 PHYS/QHP OP CAR RHAB W/ECG: CPT

## 2022-05-27 ENCOUNTER — CARDPULM VISIT (OUTPATIENT)
Dept: CARDIAC REHAB | Facility: HOSPITAL | Age: 72
End: 2022-05-27
Attending: INTERNAL MEDICINE
Payer: MEDICARE

## 2022-05-27 PROCEDURE — 93798 PHYS/QHP OP CAR RHAB W/ECG: CPT

## 2022-06-01 ENCOUNTER — CARDPULM VISIT (OUTPATIENT)
Dept: CARDIAC REHAB | Facility: HOSPITAL | Age: 72
End: 2022-06-01
Attending: INTERNAL MEDICINE
Payer: MEDICARE

## 2022-06-01 PROCEDURE — 93798 PHYS/QHP OP CAR RHAB W/ECG: CPT

## 2022-06-03 ENCOUNTER — CARDPULM VISIT (OUTPATIENT)
Dept: CARDIAC REHAB | Facility: HOSPITAL | Age: 72
End: 2022-06-03
Attending: INTERNAL MEDICINE
Payer: MEDICARE

## 2022-06-03 PROCEDURE — 93798 PHYS/QHP OP CAR RHAB W/ECG: CPT

## 2022-06-06 ENCOUNTER — CARDPULM VISIT (OUTPATIENT)
Dept: CARDIAC REHAB | Facility: HOSPITAL | Age: 72
End: 2022-06-06
Attending: INTERNAL MEDICINE
Payer: MEDICARE

## 2022-06-06 PROCEDURE — 93798 PHYS/QHP OP CAR RHAB W/ECG: CPT

## 2022-06-08 ENCOUNTER — CARDPULM VISIT (OUTPATIENT)
Dept: CARDIAC REHAB | Facility: HOSPITAL | Age: 72
End: 2022-06-08
Attending: INTERNAL MEDICINE
Payer: MEDICARE

## 2022-06-08 PROCEDURE — 93798 PHYS/QHP OP CAR RHAB W/ECG: CPT

## 2022-06-10 ENCOUNTER — CARDPULM VISIT (OUTPATIENT)
Dept: CARDIAC REHAB | Facility: HOSPITAL | Age: 72
End: 2022-06-10
Attending: INTERNAL MEDICINE
Payer: MEDICARE

## 2022-06-10 PROCEDURE — 93798 PHYS/QHP OP CAR RHAB W/ECG: CPT

## 2022-06-15 ENCOUNTER — CARDPULM VISIT (OUTPATIENT)
Dept: CARDIAC REHAB | Facility: HOSPITAL | Age: 72
End: 2022-06-15
Attending: INTERNAL MEDICINE
Payer: MEDICARE

## 2022-06-15 PROCEDURE — 93798 PHYS/QHP OP CAR RHAB W/ECG: CPT

## 2022-06-17 ENCOUNTER — CARDPULM VISIT (OUTPATIENT)
Dept: CARDIAC REHAB | Facility: HOSPITAL | Age: 72
End: 2022-06-17
Attending: INTERNAL MEDICINE
Payer: MEDICARE

## 2022-06-17 PROCEDURE — 93798 PHYS/QHP OP CAR RHAB W/ECG: CPT

## 2022-06-20 ENCOUNTER — CARDPULM VISIT (OUTPATIENT)
Dept: CARDIAC REHAB | Facility: HOSPITAL | Age: 72
End: 2022-06-20
Attending: INTERNAL MEDICINE
Payer: MEDICARE

## 2022-06-20 PROCEDURE — 93798 PHYS/QHP OP CAR RHAB W/ECG: CPT

## 2022-06-22 ENCOUNTER — CARDPULM VISIT (OUTPATIENT)
Dept: CARDIAC REHAB | Facility: HOSPITAL | Age: 72
End: 2022-06-22
Attending: INTERNAL MEDICINE
Payer: MEDICARE

## 2022-06-22 PROCEDURE — 93798 PHYS/QHP OP CAR RHAB W/ECG: CPT

## 2022-06-24 ENCOUNTER — CARDPULM VISIT (OUTPATIENT)
Dept: CARDIAC REHAB | Facility: HOSPITAL | Age: 72
End: 2022-06-24
Attending: INTERNAL MEDICINE
Payer: MEDICARE

## 2022-06-24 PROCEDURE — 93798 PHYS/QHP OP CAR RHAB W/ECG: CPT

## 2022-06-27 ENCOUNTER — CARDPULM VISIT (OUTPATIENT)
Dept: CARDIAC REHAB | Facility: HOSPITAL | Age: 72
End: 2022-06-27
Attending: INTERNAL MEDICINE
Payer: MEDICARE

## 2022-06-27 PROCEDURE — 93798 PHYS/QHP OP CAR RHAB W/ECG: CPT

## 2022-06-29 ENCOUNTER — CARDPULM VISIT (OUTPATIENT)
Dept: CARDIAC REHAB | Facility: HOSPITAL | Age: 72
End: 2022-06-29
Attending: INTERNAL MEDICINE
Payer: MEDICARE

## 2022-06-29 PROCEDURE — 93798 PHYS/QHP OP CAR RHAB W/ECG: CPT

## 2022-07-01 ENCOUNTER — CARDPULM VISIT (OUTPATIENT)
Dept: CARDIAC REHAB | Facility: HOSPITAL | Age: 72
End: 2022-07-01
Attending: INTERNAL MEDICINE
Payer: MEDICARE

## 2022-07-01 PROCEDURE — 93798 PHYS/QHP OP CAR RHAB W/ECG: CPT

## 2022-07-06 ENCOUNTER — CARDPULM VISIT (OUTPATIENT)
Dept: CARDIAC REHAB | Facility: HOSPITAL | Age: 72
End: 2022-07-06
Attending: INTERNAL MEDICINE
Payer: MEDICARE

## 2022-07-06 PROCEDURE — 93798 PHYS/QHP OP CAR RHAB W/ECG: CPT

## 2022-07-08 ENCOUNTER — CARDPULM VISIT (OUTPATIENT)
Dept: CARDIAC REHAB | Facility: HOSPITAL | Age: 72
End: 2022-07-08
Attending: INTERNAL MEDICINE
Payer: MEDICARE

## 2022-07-08 PROCEDURE — 93798 PHYS/QHP OP CAR RHAB W/ECG: CPT

## 2022-07-11 ENCOUNTER — CARDPULM VISIT (OUTPATIENT)
Dept: CARDIAC REHAB | Facility: HOSPITAL | Age: 72
End: 2022-07-11
Attending: INTERNAL MEDICINE
Payer: MEDICARE

## 2022-07-11 PROCEDURE — 93798 PHYS/QHP OP CAR RHAB W/ECG: CPT

## 2022-07-13 ENCOUNTER — CARDPULM VISIT (OUTPATIENT)
Dept: CARDIAC REHAB | Facility: HOSPITAL | Age: 72
End: 2022-07-13
Attending: INTERNAL MEDICINE
Payer: MEDICARE

## 2022-07-13 PROCEDURE — 93798 PHYS/QHP OP CAR RHAB W/ECG: CPT

## 2022-07-15 ENCOUNTER — CARDPULM VISIT (OUTPATIENT)
Dept: CARDIAC REHAB | Facility: HOSPITAL | Age: 72
End: 2022-07-15
Attending: INTERNAL MEDICINE
Payer: MEDICARE

## 2022-07-15 PROCEDURE — 93798 PHYS/QHP OP CAR RHAB W/ECG: CPT

## 2022-07-18 ENCOUNTER — CARDPULM VISIT (OUTPATIENT)
Dept: CARDIAC REHAB | Facility: HOSPITAL | Age: 72
End: 2022-07-18
Attending: INTERNAL MEDICINE
Payer: MEDICARE

## 2022-07-18 PROCEDURE — 93798 PHYS/QHP OP CAR RHAB W/ECG: CPT

## 2022-07-20 ENCOUNTER — APPOINTMENT (OUTPATIENT)
Dept: CARDIAC REHAB | Facility: HOSPITAL | Age: 72
End: 2022-07-20
Attending: INTERNAL MEDICINE
Payer: MEDICARE

## 2022-07-22 ENCOUNTER — CARDPULM VISIT (OUTPATIENT)
Dept: CARDIAC REHAB | Facility: HOSPITAL | Age: 72
End: 2022-07-22
Attending: INTERNAL MEDICINE
Payer: MEDICARE

## 2022-07-22 PROCEDURE — 93798 PHYS/QHP OP CAR RHAB W/ECG: CPT

## 2022-07-25 ENCOUNTER — CARDPULM VISIT (OUTPATIENT)
Dept: CARDIAC REHAB | Facility: HOSPITAL | Age: 72
End: 2022-07-25
Attending: INTERNAL MEDICINE
Payer: MEDICARE

## 2022-07-25 PROCEDURE — 93798 PHYS/QHP OP CAR RHAB W/ECG: CPT

## 2022-09-01 ENCOUNTER — TELEPHONE (OUTPATIENT)
Dept: PULMONOLOGY | Facility: CLINIC | Age: 72
End: 2022-09-01

## 2022-09-01 NOTE — TELEPHONE ENCOUNTER
Pt has an overdue result for CT CHEST    Upon investigation, order was placed on 2/8/2022 and not completed at this time. Letter sent via [employer] due to Active status. Postponed in 2 [facility] results for one month.

## 2022-09-19 ENCOUNTER — LAB ENCOUNTER (OUTPATIENT)
Dept: LAB | Facility: HOSPITAL | Age: 72
End: 2022-09-19
Attending: INTERNAL MEDICINE

## 2022-09-19 ENCOUNTER — OFFICE VISIT (OUTPATIENT)
Dept: GASTROENTEROLOGY | Facility: CLINIC | Age: 72
End: 2022-09-19

## 2022-09-19 VITALS
WEIGHT: 239.63 LBS | DIASTOLIC BLOOD PRESSURE: 66 MMHG | SYSTOLIC BLOOD PRESSURE: 134 MMHG | BODY MASS INDEX: 35.49 KG/M2 | HEART RATE: 60 BPM | HEIGHT: 69 IN

## 2022-09-19 DIAGNOSIS — K62.5 RECTAL BLEEDING: Primary | ICD-10-CM

## 2022-09-19 DIAGNOSIS — K59.04 CHRONIC IDIOPATHIC CONSTIPATION: ICD-10-CM

## 2022-09-19 DIAGNOSIS — T14.8XXA CRUSHING INJURY OF MULTIPLE SITES OF TRUNK: Primary | ICD-10-CM

## 2022-09-19 DIAGNOSIS — Z86.010 HISTORY OF ADENOMATOUS POLYP OF COLON: ICD-10-CM

## 2022-09-19 DIAGNOSIS — I25.10 CORONARY ATHEROSCLEROSIS OF NATIVE CORONARY ARTERY: ICD-10-CM

## 2022-09-19 DIAGNOSIS — K21.9 GASTROESOPHAGEAL REFLUX DISEASE, UNSPECIFIED WHETHER ESOPHAGITIS PRESENT: ICD-10-CM

## 2022-09-19 LAB
BASOPHILS # BLD AUTO: 0.04 X10(3) UL (ref 0–0.2)
BASOPHILS NFR BLD AUTO: 0.6 %
DEPRECATED RDW RBC AUTO: 51.2 FL (ref 35.1–46.3)
EOSINOPHIL # BLD AUTO: 0.18 X10(3) UL (ref 0–0.7)
EOSINOPHIL NFR BLD AUTO: 2.7 %
ERYTHROCYTE [DISTWIDTH] IN BLOOD BY AUTOMATED COUNT: 15 % (ref 11–15)
HCT VFR BLD AUTO: 38.2 %
HGB BLD-MCNC: 11.9 G/DL
IMM GRANULOCYTES # BLD AUTO: 0.01 X10(3) UL (ref 0–1)
IMM GRANULOCYTES NFR BLD: 0.1 %
LYMPHOCYTES # BLD AUTO: 1.96 X10(3) UL (ref 1–4)
LYMPHOCYTES NFR BLD AUTO: 29 %
MCH RBC QN AUTO: 28.7 PG (ref 26–34)
MCHC RBC AUTO-ENTMCNC: 31.2 G/DL (ref 31–37)
MCV RBC AUTO: 92 FL
MONOCYTES # BLD AUTO: 0.48 X10(3) UL (ref 0.1–1)
MONOCYTES NFR BLD AUTO: 7.1 %
NEUTROPHILS # BLD AUTO: 4.09 X10 (3) UL (ref 1.5–7.7)
NEUTROPHILS # BLD AUTO: 4.09 X10(3) UL (ref 1.5–7.7)
NEUTROPHILS NFR BLD AUTO: 60.5 %
PLATELET # BLD AUTO: 220 10(3)UL (ref 150–450)
RBC # BLD AUTO: 4.15 X10(6)UL
WBC # BLD AUTO: 6.8 X10(3) UL (ref 4–11)

## 2022-09-19 PROCEDURE — 3008F BODY MASS INDEX DOCD: CPT | Performed by: INTERNAL MEDICINE

## 2022-09-19 PROCEDURE — 3078F DIAST BP <80 MM HG: CPT | Performed by: INTERNAL MEDICINE

## 2022-09-19 PROCEDURE — 36415 COLL VENOUS BLD VENIPUNCTURE: CPT

## 2022-09-19 PROCEDURE — 99214 OFFICE O/P EST MOD 30 MIN: CPT | Performed by: INTERNAL MEDICINE

## 2022-09-19 PROCEDURE — 3075F SYST BP GE 130 - 139MM HG: CPT | Performed by: INTERNAL MEDICINE

## 2022-09-19 PROCEDURE — 85025 COMPLETE CBC W/AUTO DIFF WBC: CPT

## 2022-09-19 PROCEDURE — 1126F AMNT PAIN NOTED NONE PRSNT: CPT | Performed by: INTERNAL MEDICINE

## 2022-09-19 RX ORDER — NEBIVOLOL 10 MG/1
10 TABLET ORAL 2 TIMES DAILY
COMMUNITY

## 2022-09-19 RX ORDER — POLYETHYLENE GLYCOL 3350, SODIUM SULFATE ANHYDROUS, SODIUM BICARBONATE, SODIUM CHLORIDE, POTASSIUM CHLORIDE 236; 22.74; 6.74; 5.86; 2.97 G/4L; G/4L; G/4L; G/4L; G/4L
4 POWDER, FOR SOLUTION ORAL ONCE
Qty: 1 EACH | Refills: 0 | Status: SHIPPED | OUTPATIENT
Start: 2022-09-19 | End: 2022-09-19

## 2022-09-20 ENCOUNTER — TELEPHONE (OUTPATIENT)
Dept: GASTROENTEROLOGY | Facility: CLINIC | Age: 72
End: 2022-09-20

## 2022-09-20 DIAGNOSIS — Z12.11 COLON CANCER SCREENING: Primary | ICD-10-CM

## 2022-09-20 DIAGNOSIS — K62.5 RECTAL BLEEDING: ICD-10-CM

## 2022-10-31 DIAGNOSIS — I87.2 VENOUS INSUFFICIENCY: ICD-10-CM

## 2022-10-31 RX ORDER — FUROSEMIDE 40 MG/1
TABLET ORAL
Qty: 180 TABLET | Refills: 0 | Status: SHIPPED | OUTPATIENT
Start: 2022-10-31

## 2022-12-21 NOTE — TELEPHONE ENCOUNTER
Pt has not completed CT CHEST. Generated certified letter, sent to pt address as listed in [address] Signing encounter, no further action is required at this time.

## 2022-12-27 ENCOUNTER — HOSPITAL ENCOUNTER (OUTPATIENT)
Facility: HOSPITAL | Age: 72
Setting detail: HOSPITAL OUTPATIENT SURGERY
Discharge: HOME | End: 2022-12-27
Attending: INTERNAL MEDICINE | Admitting: INTERNAL MEDICINE
Payer: MEDICARE

## 2022-12-27 ENCOUNTER — ANESTHESIA (OUTPATIENT)
Dept: ENDOSCOPY | Facility: HOSPITAL | Age: 72
End: 2022-12-27
Payer: MEDICARE

## 2022-12-27 ENCOUNTER — ANESTHESIA EVENT (OUTPATIENT)
Dept: ENDOSCOPY | Facility: HOSPITAL | Age: 72
End: 2022-12-27
Payer: MEDICARE

## 2022-12-27 VITALS
HEIGHT: 69 IN | SYSTOLIC BLOOD PRESSURE: 125 MMHG | RESPIRATION RATE: 14 BRPM | WEIGHT: 240 LBS | BODY MASS INDEX: 35.55 KG/M2 | HEART RATE: 55 BPM | DIASTOLIC BLOOD PRESSURE: 63 MMHG | OXYGEN SATURATION: 98 %

## 2022-12-27 LAB — GLUCOSE BLDC GLUCOMTR-MCNC: 128 MG/DL (ref 70–99)

## 2022-12-27 PROCEDURE — 0DJD8ZZ INSPECTION OF LOWER INTESTINAL TRACT, VIA NATURAL OR ARTIFICIAL OPENING ENDOSCOPIC: ICD-10-PCS | Performed by: INTERNAL MEDICINE

## 2022-12-27 PROCEDURE — G0105 COLORECTAL SCRN; HI RISK IND: HCPCS | Performed by: INTERNAL MEDICINE

## 2022-12-27 RX ORDER — NICOTINE POLACRILEX 4 MG
30 LOZENGE BUCCAL
OUTPATIENT
Start: 2022-12-27

## 2022-12-27 RX ORDER — NALOXONE HYDROCHLORIDE 0.4 MG/ML
80 INJECTION, SOLUTION INTRAMUSCULAR; INTRAVENOUS; SUBCUTANEOUS AS NEEDED
OUTPATIENT
Start: 2022-12-27 | End: 2022-12-27

## 2022-12-27 RX ORDER — SODIUM CHLORIDE, SODIUM LACTATE, POTASSIUM CHLORIDE, CALCIUM CHLORIDE 600; 310; 30; 20 MG/100ML; MG/100ML; MG/100ML; MG/100ML
INJECTION, SOLUTION INTRAVENOUS CONTINUOUS
Status: DISCONTINUED | OUTPATIENT
Start: 2022-12-27 | End: 2022-12-27

## 2022-12-27 RX ORDER — DEXTROSE MONOHYDRATE 25 G/50ML
50 INJECTION, SOLUTION INTRAVENOUS
OUTPATIENT
Start: 2022-12-27

## 2022-12-27 RX ORDER — LIDOCAINE HYDROCHLORIDE 20 MG/ML
INJECTION, SOLUTION EPIDURAL; INFILTRATION; INTRACAUDAL; PERINEURAL AS NEEDED
Status: DISCONTINUED | OUTPATIENT
Start: 2022-12-27 | End: 2022-12-27 | Stop reason: SURG

## 2022-12-27 RX ORDER — NICOTINE POLACRILEX 4 MG
15 LOZENGE BUCCAL
OUTPATIENT
Start: 2022-12-27

## 2022-12-27 RX ORDER — SODIUM CHLORIDE, SODIUM LACTATE, POTASSIUM CHLORIDE, CALCIUM CHLORIDE 600; 310; 30; 20 MG/100ML; MG/100ML; MG/100ML; MG/100ML
INJECTION, SOLUTION INTRAVENOUS CONTINUOUS
OUTPATIENT
Start: 2022-12-27

## 2022-12-27 RX ADMIN — SODIUM CHLORIDE, SODIUM LACTATE, POTASSIUM CHLORIDE, CALCIUM CHLORIDE: 600; 310; 30; 20 INJECTION, SOLUTION INTRAVENOUS at 09:20:00

## 2022-12-27 RX ADMIN — SODIUM CHLORIDE, SODIUM LACTATE, POTASSIUM CHLORIDE, CALCIUM CHLORIDE: 600; 310; 30; 20 INJECTION, SOLUTION INTRAVENOUS at 08:44:00

## 2022-12-27 RX ADMIN — LIDOCAINE HYDROCHLORIDE 40 MG: 20 INJECTION, SOLUTION EPIDURAL; INFILTRATION; INTRACAUDAL; PERINEURAL at 08:47:00

## 2022-12-27 NOTE — ANESTHESIA POSTPROCEDURE EVALUATION
Patient: Siena Barron    Procedure Summary     Date: 12/27/22 Room / Location: [facility] ENDOSCOPY 05 / [facility] ENDOSCOPY    Anesthesia Start: 8281 Anesthesia Stop:     Procedure: COLONOSCOPY-SCREENING Diagnosis:       Colon cancer screening      Rectal bleeding      (diverticulosis, hemorrhoids)    Surgeons: Harvinder Julian MD Anesthesiologist: Nathalie Houser CRNA    Anesthesia Type: MAC ASA Status: 3          Anesthesia Type: MAC    Vitals Value Taken Time   /54 12/27/22 0920   Temp  12/27/22 0921   Pulse 63 12/27/22 0920   Resp 19 12/27/22 0920   SpO2 99 % 12/27/22 0920   Vitals shown include unvalidated device data. [facility] AN Post Evaluation:   Patient Evaluated in Patient location: Endo recovery.   Patient Participation: complete - patient participated  Level of Consciousness: awake and alert  Pain Score: 0  Pain Management: adequate  Airway Patency:patent  Yes    Cardiovascular Status: acceptable  Respiratory Status: acceptable  Postoperative Hydration acceptable      Valeriano Schwarz CRNA  12/27/2022 9:21 AM

## 2022-12-27 NOTE — DISCHARGE INSTRUCTIONS
.  .  .  Notes from Dr. Wing Plascencia:    Medium severity \"diverticuLOSIS\" (pockets) of the wall of the colon - very common. You should be provided a handout regarding diet and the possible risk of diverticulitis/infection  Very small internal hemorrhoids only on today's colonoscopy exam.  Those may cause occasional blood streaks but are not a problem. NO colon polyps today. Great news. Resume taking the Plavix anticoagulation tonight  Repeat colonoscopy exam or other colon cancer screening in 5 years, depending on your health and condition at that time. .  . Home Care Instructions for Colonoscopy with Sedation    Diet:  - Resume your regular diet as tolerated unless otherwise instructed. - Start with light meals to minimize bloating.  - Do not drink alcohol today. Medication:  - If you have questions about resuming your normal medications, please contact your Primary Care Physician. Activities:  - Take it easy today. Do not return to work today. - Do not drive today. - Do not operate any machinery today (including kitchen equipment). Colonoscopy:  - You may notice some rectal \"spotting\" (a little blood on the toilet tissue) for a day or two after the exam. This is normal.  - If you experience any rectal bleeding (not spotting), persistent tenderness or sharp severe abdominal pains, oral temperature over 100 degrees Fahrenheit, light-headedness or dizziness, or any other problems, contact your doctor. **If unable to reach your doctor, please go to the [facility]**    - Your referring physician will receive a full report of your examination.  - If you do not hear from your doctor's office within two weeks of your biopsy, please call them for your results. You may be able to see your laboratory results in [facility] between 4 and 7 business days. In some cases, your physician may not have viewed the results before they are released to [address]   If you have questions regarding your results contact the physician who ordered the test/exam by phone or via [address] by choosing \"Ask a Medical Question. \"

## 2022-12-30 DIAGNOSIS — I87.2 VENOUS INSUFFICIENCY: ICD-10-CM

## 2022-12-30 RX ORDER — FUROSEMIDE 40 MG/1
TABLET ORAL
Qty: 180 TABLET | Refills: 0 | OUTPATIENT
Start: 2022-12-30

## 2023-01-04 ENCOUNTER — TELEPHONE (OUTPATIENT)
Dept: PULMONOLOGY | Facility: CLINIC | Age: 73
End: 2023-01-04

## 2023-01-20 ENCOUNTER — HOSPITAL ENCOUNTER (OUTPATIENT)
Dept: CT IMAGING | Facility: HOSPITAL | Age: 73
Discharge: HOME | End: 2023-01-20
Attending: INTERNAL MEDICINE
Payer: MEDICARE

## 2023-01-20 DIAGNOSIS — R91.8 LUNG NODULES: ICD-10-CM

## 2023-01-20 PROCEDURE — 71250 CT THORAX DX C-: CPT | Performed by: INTERNAL MEDICINE

## 2023-07-07 ENCOUNTER — OFFICE VISIT (OUTPATIENT)
Dept: NEPHROLOGY | Facility: CLINIC | Age: 73
End: 2023-07-07

## 2023-07-07 VITALS
BODY MASS INDEX: 35.84 KG/M2 | HEART RATE: 53 BPM | SYSTOLIC BLOOD PRESSURE: 106 MMHG | WEIGHT: 242 LBS | HEIGHT: 69 IN | DIASTOLIC BLOOD PRESSURE: 64 MMHG

## 2023-07-07 DIAGNOSIS — N18.32 STAGE 3B CHRONIC KIDNEY DISEASE (HCC): Primary | ICD-10-CM

## 2023-07-07 PROCEDURE — 3074F SYST BP LT 130 MM HG: CPT | Performed by: INTERNAL MEDICINE

## 2023-07-07 PROCEDURE — 99215 OFFICE O/P EST HI 40 MIN: CPT | Performed by: INTERNAL MEDICINE

## 2023-07-07 PROCEDURE — 3008F BODY MASS INDEX DOCD: CPT | Performed by: INTERNAL MEDICINE

## 2023-07-07 PROCEDURE — 3078F DIAST BP <80 MM HG: CPT | Performed by: INTERNAL MEDICINE

## 2023-07-07 PROCEDURE — 1159F MED LIST DOCD IN RCRD: CPT | Performed by: INTERNAL MEDICINE

## 2023-07-07 RX ORDER — AMLODIPINE BESYLATE 10 MG/1
10 TABLET ORAL DAILY
COMMUNITY
Start: 2023-05-04

## 2023-07-08 NOTE — PROGRESS NOTES
07/08/23        Patient: Lou Marmolejo   YOB: 1950   Date of Visit: 7/7/2023       Dear  Dr. Alesha Choudhury MD,      Thank you for referring Lou Marmolejo to my practice. Please find my assessment and plan below. As you know she is a 79-year-old female with a history of adult onset diabetes mellitus, hypertension, coronary artery disease who I now had the pleasure of seeing for follow-up of chronic kidney disease stage III. She has not been compliant with follow-up and was last seen in May 2022. Overall though she states she has been doing okay without any chest pain, shortness of breath, GI or urinary tract symptoms. Recent laboratory studies were notable for the fact that on April 26, 2023 her creatinine actually was better at 1.09 with an estimated GFR of 59 cc/min. However follow-up labs done on June 12, 2023 were notable for creatinine of 1.91 with an estimated GFR 30 cc/min. She and the daughter states that after those labs Aldactone was discontinued. Lisinopril was decreased from 40 mg down to 20 mg daily and furosemide was decreased to 40 mg 3 times per week. Patient does have some chronic lower extremity edema. She thinks it may have gotten a bit worse since diuretics were decreased. However she denies any shortness of breath or chest pain. She has been checking her blood pressures at home and systolics usually in the 832F with diastolics in the 11Z. On physical exam her blood pressure is 106/64 with a pulse of 53 and she weighed 242 pounds. Her neck was supple without JVD. Lungs were clear. Heart revealed a regular rate and rhythm with an S4 but no gallops, murmurs or rubs. Abdomen was soft, flat, nontender without organomegaly, masses or bruits. Extremities reveal 1+ edema bilaterally. I therefore informed the patient and her daughter that she did develop acute renal failure. This may be secondary to diuretics and lisinopril.   She does have lower extremity edema but has not had significant proteinuria. She just had a renal ultrasound done on June 26, 2023 that showed multiple small stones in the left kidney but otherwise was unremarkable. No prior history of renal calculi. She also just had an echocardiogram done yesterday which mentions the possibility of diastolic dysfunction but otherwise was unremarkable. She states she will be doing follow-up laboratory studies next week. They will have the lab copy me on these results. Suspect there will be improvement in renal function. Reinforced importance of following a low-salt diet. Maintain adequate hydration. Avoid nonsteroidals. Further impressions and recommendations will be forthcoming after reviewing the above. Thank you very much for allowing me to participate in the care of your patient. If you have any questions please feel free to call.         Sincerely,   Soham Reid MD   [facility] MEDICAL [facility], [address] [place] [address]  [address] [facility] [address]  [address] [facility] [address]    Document electronically generated by:  Soham Reid MD

## 2023-07-08 NOTE — PATIENT INSTRUCTIONS
Please make sure the lab sends me a copy of your follow-up laboratory studies. Follow a low-salt diet carefully.

## 2023-07-12 ENCOUNTER — OFFICE VISIT (OUTPATIENT)
Dept: ENDOCRINOLOGY CLINIC | Facility: CLINIC | Age: 73
End: 2023-07-12

## 2023-07-12 VITALS
HEART RATE: 47 BPM | HEIGHT: 69.02 IN | DIASTOLIC BLOOD PRESSURE: 69 MMHG | WEIGHT: 243 LBS | BODY MASS INDEX: 35.99 KG/M2 | SYSTOLIC BLOOD PRESSURE: 134 MMHG

## 2023-07-12 DIAGNOSIS — E11.22 CONTROLLED TYPE 2 DIABETES MELLITUS WITH CHRONIC KIDNEY DISEASE, WITHOUT LONG-TERM CURRENT USE OF INSULIN, UNSPECIFIED CKD STAGE (HCC): Primary | ICD-10-CM

## 2023-07-12 LAB
CARTRIDGE LOT#: ABNORMAL NUMERIC
GLUCOSE BLOOD: 127
HEMOGLOBIN A1C: 7.2 % (ref 4.3–5.6)
TEST STRIP LOT #: NORMAL NUMERIC

## 2023-07-12 PROCEDURE — 83036 HEMOGLOBIN GLYCOSYLATED A1C: CPT

## 2023-07-12 PROCEDURE — 3078F DIAST BP <80 MM HG: CPT

## 2023-07-12 PROCEDURE — 3075F SYST BP GE 130 - 139MM HG: CPT

## 2023-07-12 PROCEDURE — 1160F RVW MEDS BY RX/DR IN RCRD: CPT

## 2023-07-12 PROCEDURE — 82947 ASSAY GLUCOSE BLOOD QUANT: CPT

## 2023-07-12 PROCEDURE — 99204 OFFICE O/P NEW MOD 45 MIN: CPT

## 2023-07-12 PROCEDURE — 3008F BODY MASS INDEX DOCD: CPT

## 2023-07-12 PROCEDURE — 3051F HG A1C>EQUAL 7.0%<8.0%: CPT

## 2023-07-12 PROCEDURE — 1159F MED LIST DOCD IN RCRD: CPT

## 2023-07-12 NOTE — PATIENT INSTRUCTIONS
Please return to the lab in 6 weeks for lab test- does not need to be fasting.      Continue Metformin- 1 tablet twice daily     Continue Januvia 100mg daily     Continue Jardiance 10mg daily

## 2023-07-12 NOTE — PROGRESS NOTES
Name: Lucrezia Scheuermann  Date: 7/12/2023    Referring Physician: No ref. provider found    HISTORY OF PRESENT ILLNESS   Lucrezia Scheuermann is a 81year old female who presents for consult for diabetes mellitus     She was referred by PCP for management of diabetes. Diabetes History:  Diagnosed- around 10 years ago   Patient has not had hospitalizations for blood sugar issues    Prior glycohemoglobin were: 9.5% 4/2023; 7.2% POC today   Glucose in clinic today- 127 mg/dl     Dietary compliance: Fair- has been trying to decrease intake of soda in the past several months      Recall:  Breakfast- Chick erika sandwich and a few fries and small fruit cup   Lunch- sometimes skips, typically leftovers from dinner meal   Dinner- Protein, with potato, sometimes vegetable   Snack- Sometimes oreo cookies or 1 scoop ice cream   Beverages- water, occ orange crush or tea - unsweetened, or occ. Small can ginger ale or sprite     Exercise: No- some walking- fairly minimal at this time.      Polyuria/polydipsia: No  Blurred vision: No- blurry vision in R eye- macular hole     Episodes of hypoglycemia: No  Blood Glucose:  Checking - twice daily- reviewed glucose logs for last several weeks    Fasting- 141, 128, 115, 118, 129, 107, 133, 119, 116, 122, 99, 114, 119, 102, 125, 109, 123, 111, 124, 112, 116, 131, 113,   Before dinner- 119, 109, 104, 127, 106, 122, 110, 103, 112, 129, 118, 137, 108    Current DM Regimen:  Jardiance- 10mg PO daily   Metformin- 500mg PO BID  Januvia 100mg daily--> increased at last visit     Modifying factors:  Medication adherence: yes   Recent steroids, illness or infections: no       REVIEW OF SYSTEMS  Eyes: Diabetic retinopathy present: Yes- mild NPDR            Most recent visit to eye doctor in last 12 months: Yes- Dr. Tennille Mueller 6/20/2023    CV: Cardiovascular disease present: Yes- s/p stent x 2 12/2021         Hypertension present: Yes         Hyperlipidemia present: Yes         Peripheral Vascular Disease present: No    : Nephropathy present: Yes    Neuro: Neuropathy present: No    Skin: Infection or ulceration: No    Osteoporosis: No    Thyroid disease: No      Medications:     Current Outpatient Medications:     empagliflozin 10 MG Oral Tab, Take 1 tablet (10 mg total) by mouth daily. , Disp: , Rfl:     metFORMIN 500 MG Oral Tab, Take 1 tablet (500 mg total) by mouth 2 (two) times daily with meals. , Disp: 180 tablet, Rfl: 1    SITagliptin Phosphate (JANUVIA) 50 MG Oral Tab, Take 1 tablet (50 mg total) by mouth daily. (Patient taking differently: Take 2 tablets (100 mg total) by mouth Noon.), Disp: 90 tablet, Rfl: 1    amLODIPine 10 MG Oral Tab, Take 1 tablet (10 mg total) by mouth daily. , Disp: , Rfl:     nebivolol 10 MG Oral Tab, Take 1 tablet (10 mg total) by mouth in the morning and 1 tablet (10 mg total) before bedtime. , Disp: , Rfl:     ergocalciferol 1.25 MG (49350 UT) Oral Cap, Take 1 tablet by mouth once a week. (Patient not taking: Reported on 7/7/2023), Disp: , Rfl:     ROSUVASTATIN 40 MG Oral Tab, TAKE ONE TABLET BY MOUTH DAILY, Disp: 90 tablet, Rfl: 0    ACCU-CHEK SOFTCLIX LANCETS Does not apply Misc, TEST BLOOD GLUCOSE ONCE DAILY, Disp: 100 each, Rfl: 3    CARVEDILOL 25 MG Oral Tab, TAKE ONE TABLET BY MOUTH TWICE A DAY WITH MEALS, Disp: 180 tablet, Rfl: 1    aspirin 81 MG Oral Tab EC, Take 1 tablet (81 mg total) by mouth daily. , Disp: , Rfl:     lisinopril 40 MG Oral Tab, Take 1 tablet (40 mg total) by mouth daily. (Patient taking differently: Take 0.5 tablets (20 mg total) by mouth daily.), Disp: 90 tablet, Rfl: 1    Glucose Blood (ACCU-CHEK MILLA PLUS) In Vitro Strip, 2 (two) times a day., Disp: , Rfl:     Artificial Tear Solution (ARTIFICIAL TEARS OP), daily as needed. (Patient not taking: Reported on 5/10/2022), Disp: , Rfl:     furosemide 40 MG Oral Tab, Take 1 tablet (40 mg total) by mouth daily.  Pt taking three times a day, Disp: , Rfl:      Allergies:     Flu Virus Vaccine       OTHER (SEE COMMENTS)    Comment:Other reaction(s): Propensity to adverse reactions             to drug  Sulfa Antibiotics       OTHER (SEE COMMENTS)  Other                   OTHER (SEE COMMENTS)    Social History:   Social History    Socioeconomic History      Marital status: Single    Tobacco Use      Smoking status: Never      Smokeless tobacco: Never    Vaping Use      Vaping Use: Never used    Substance and Sexual Activity      Alcohol use: Not Currently      Drug use: Never      Medical History:   Past Medical History:   Diagnosis Date    Diabetes ([facility] [address]     Diabetes mellitus ([facility] [address]     Diverticulitis     Essential hypertension     High blood pressure     High cholesterol     Hyperlipidemia     Sleep apnea        Surgical history:   Past Surgical History:   Procedure Laterality Date    ANGIOGRAM N/A 01/07/2022    CATARACT EXTRACTION W/  INTRAOCULAR LENS IMPLANT Right 2017    Dr. Lita Mcleod N/A 6/5/2020    Procedure: COLONOSCOPY;  Surgeon: Rufino Willett MD;  Location: [address] [place] [address] ENDOSCOPY    COLONOSCOPY SCREENING - REFERRAL N/A 12/27/2022    Procedure: COLONOSCOPY-SCREENING;  Surgeon: Rufino Willett MD;  Location: [address] [place] [address] ENDOSCOPY    HYSTERECTOMY      VIT FOR MACULAR HOLE Right 2016    surgery for macular hole in 2016- Dr. Joanne Rodrigues?     YAG CAPSULOTOMY - OD - RIGHT EYE Right 2017    Dr. Stephany Ramirez   07/12/23  1015   Weight: 243 lb (110.2 kg)   Height: 5' 9.02\" (1.753 m)       General Appearance:  alert, well developed, in no acute distress  Eyes:  normal conjunctivae, sclera, and normal pupils  Neck: Trachea midline: Normal  Back: no kyphosis or back tenderness  Lymph Nodes:  No abnormal nodes noted  Musculoskeletal:  normal muscle strength and tone  Skin:  normal moisture and skin texture  Hair & Nails:  normal scalp hair     Hematologic:  no excessive bruising  Neuro:  sensory grossly intact and motor grossly intact, normal monofilament exam.  Psychiatric:  oriented to time, self, and place  Nutritional:  no abnormal weight gain or loss  Feet: Bilateral barefoot skin diabetic exam is normal, visualized feet and the appearance is normal.  Bilateral monofilament/sensation of both feet is normal.  Pulsation pedal pulse exam of both lower legs/feet is normal as well. ASSESSMENT/PLAN:    Diabetes mellitus type 2 uncontrolled  -HgA1c- 7.2%--> significantly improved in last 3 months and sugars at home largely at goal.   -Reviewed ABC's of diabetes   - Reviewed pathogenesis of diabetes. - Reviewed importance of good glycemic control to prevent microvascular and macrovascular complications including nephropathy, neuropathy, retinopathy, and cardiovascular disease.  - Reviewed importance of SBGM- check glucose 2 times daily   - Reviewed target glucose goals for patient  fasting and <180 post prandially   - Reviewed importance of following diabetic diet- recommended 135 grams of CHO per day or 45 grams per meal.   - Provided patient education materials    - Continue Jardiance 10mg PO daily. Reviewed side effects and risks vs benefits of medication. Reviewed importance of staying well hydrated on medication     -Continue Metformin 500mg PO BID- eGFR 46 7/2023- reviewed that she cannot take larger dose given current kidney function- will monitor kidney function closely    -Continue Januvia 100mg PO daily- dose recently increased by PCP. If kidney function declines we will have to reduce dose of Januvia- Will have her repeat BMP in 6 weeks to monitor kidney function.     -If drop in renal function and Januvia needs to be decreased would recommend increase in Jardiance in this case. Hold off on increasing today given increased risk of UTI and blood sugars are significantly improved on current medications today.      -BP elevated but improved on recheck   -lipids at goal 4/2023- on rosuvastatin 40mg daily   -+ nephropathy- CKD- following with nephrology- Saw Dr. Arnold Gibson 7/2023- on lisinopril   -[facility] with optho- reviewed importance of optho follow up  -normal foot exam today- reviewed daily foot exam and foot care  -Reviewed low CHO diet today  - Continue to check sugars 1-2 times daily- alternating fasting and post prandial- Call office if sugars <80 or persistently >180.      RTC in 3 months   7/12/2023  MATHIEU Isaacs

## 2023-08-30 ENCOUNTER — TELEPHONE (OUTPATIENT)
Dept: ENDOCRINOLOGY CLINIC | Facility: CLINIC | Age: 73
End: 2023-08-30

## 2023-08-30 ENCOUNTER — LAB ENCOUNTER (OUTPATIENT)
Dept: LAB | Facility: HOSPITAL | Age: 73
End: 2023-08-30
Attending: INTERNAL MEDICINE
Payer: MEDICARE

## 2023-08-30 DIAGNOSIS — I50.22 HEART FAILURE WITH MILDLY REDUCED EJECTION FRACTION (HCC): Primary | ICD-10-CM

## 2023-08-30 DIAGNOSIS — E11.22 CONTROLLED TYPE 2 DIABETES MELLITUS WITH CHRONIC KIDNEY DISEASE, WITHOUT LONG-TERM CURRENT USE OF INSULIN, UNSPECIFIED CKD STAGE (HCC): ICD-10-CM

## 2023-08-30 LAB
ANION GAP SERPL CALC-SCNC: 7 MMOL/L (ref 0–18)
BUN BLD-MCNC: 21 MG/DL (ref 7–18)
BUN/CREAT SERPL: 14.9 (ref 10–20)
CALCIUM BLD-MCNC: 9.3 MG/DL (ref 8.5–10.1)
CHLORIDE SERPL-SCNC: 111 MMOL/L (ref 98–112)
CO2 SERPL-SCNC: 25 MMOL/L (ref 21–32)
CREAT BLD-MCNC: 1.41 MG/DL
EGFRCR SERPLBLD CKD-EPI 2021: 40 ML/MIN/1.73M2 (ref 60–?)
FASTING STATUS PATIENT QL REPORTED: YES
GLUCOSE BLD-MCNC: 119 MG/DL (ref 70–99)
NT-PROBNP SERPL-MCNC: 211 PG/ML (ref ?–125)
OSMOLALITY SERPL CALC.SUM OF ELEC: 300 MOSM/KG (ref 275–295)
POTASSIUM SERPL-SCNC: 3.8 MMOL/L (ref 3.5–5.1)
SODIUM SERPL-SCNC: 143 MMOL/L (ref 136–145)

## 2023-08-30 PROCEDURE — 83880 ASSAY OF NATRIURETIC PEPTIDE: CPT

## 2023-08-30 PROCEDURE — 36415 COLL VENOUS BLD VENIPUNCTURE: CPT

## 2023-08-30 PROCEDURE — 80048 BASIC METABOLIC PNL TOTAL CA: CPT

## 2023-08-30 NOTE — TELEPHONE ENCOUNTER
Please call patient. Received result of her BMP and kidney function has declined- eGFR was 45 earlier this month but now 40. I would like her to decrease the dose of Januvia from 100mg daily to 50mg daily based on kidney function.

## 2023-08-30 NOTE — TELEPHONE ENCOUNTER
Spoke with patient regarding lab results. Advised patient that [facility] wants to decrease Januvia to 50 mg based on test results. Patient stated she just picked up the 100 mg dose today and I advised her that she could cut the tablet in half, and if she could not do this to let us know. Patient also requesting for update to be sent to her cardiologist Dr. Desiree Sanz. Routing encounter.

## 2023-12-04 ENCOUNTER — HOSPITAL ENCOUNTER (OUTPATIENT)
Dept: GENERAL RADIOLOGY | Facility: HOSPITAL | Age: 73
Discharge: HOME | End: 2023-12-04
Attending: INTERNAL MEDICINE
Payer: MEDICARE

## 2023-12-04 ENCOUNTER — LAB ENCOUNTER (OUTPATIENT)
Dept: LAB | Facility: HOSPITAL | Age: 73
End: 2023-12-04
Attending: INTERNAL MEDICINE
Payer: MEDICARE

## 2023-12-04 DIAGNOSIS — Z01.818 PREOP TESTING: ICD-10-CM

## 2023-12-04 DIAGNOSIS — Z01.818 PREOPERATIVE EXAMINATION, UNSPECIFIED: ICD-10-CM

## 2023-12-04 DIAGNOSIS — I25.10 CORONARY ATHEROSCLEROSIS OF NATIVE CORONARY ARTERY: Primary | ICD-10-CM

## 2023-12-04 DIAGNOSIS — I50.22 CHRONIC SYSTOLIC HEART FAILURE (HCC): ICD-10-CM

## 2023-12-04 LAB
ANION GAP SERPL CALC-SCNC: 6 MMOL/L (ref 0–18)
BASOPHILS # BLD AUTO: 0.03 X10(3) UL (ref 0–0.2)
BASOPHILS NFR BLD AUTO: 0.4 %
BUN BLD-MCNC: 17 MG/DL (ref 9–23)
BUN/CREAT SERPL: 16.8 (ref 10–20)
CALCIUM BLD-MCNC: 9.5 MG/DL (ref 8.7–10.4)
CHLORIDE SERPL-SCNC: 108 MMOL/L (ref 98–112)
CO2 SERPL-SCNC: 28 MMOL/L (ref 21–32)
CREAT BLD-MCNC: 1.01 MG/DL
DEPRECATED RDW RBC AUTO: 51.6 FL (ref 35.1–46.3)
EGFRCR SERPLBLD CKD-EPI 2021: 60 ML/MIN/1.73M2 (ref 60–?)
EOSINOPHIL # BLD AUTO: 0.21 X10(3) UL (ref 0–0.7)
EOSINOPHIL NFR BLD AUTO: 3 %
ERYTHROCYTE [DISTWIDTH] IN BLOOD BY AUTOMATED COUNT: 15.9 % (ref 11–15)
FASTING STATUS PATIENT QL REPORTED: NO
GLUCOSE BLD-MCNC: 132 MG/DL (ref 70–99)
HCT VFR BLD AUTO: 40.1 %
HGB BLD-MCNC: 12.7 G/DL
IMM GRANULOCYTES # BLD AUTO: 0.02 X10(3) UL (ref 0–1)
IMM GRANULOCYTES NFR BLD: 0.3 %
INR BLD: 1.02 (ref 0.8–1.2)
LYMPHOCYTES # BLD AUTO: 2 X10(3) UL (ref 1–4)
LYMPHOCYTES NFR BLD AUTO: 28.5 %
MCH RBC QN AUTO: 28 PG (ref 26–34)
MCHC RBC AUTO-ENTMCNC: 31.7 G/DL (ref 31–37)
MCV RBC AUTO: 88.3 FL
MONOCYTES # BLD AUTO: 0.46 X10(3) UL (ref 0.1–1)
MONOCYTES NFR BLD AUTO: 6.6 %
NEUTROPHILS # BLD AUTO: 4.3 X10 (3) UL (ref 1.5–7.7)
NEUTROPHILS # BLD AUTO: 4.3 X10(3) UL (ref 1.5–7.7)
NEUTROPHILS NFR BLD AUTO: 61.2 %
OSMOLALITY SERPL CALC.SUM OF ELEC: 297 MOSM/KG (ref 275–295)
PLATELET # BLD AUTO: 215 10(3)UL (ref 150–450)
POTASSIUM SERPL-SCNC: 3.9 MMOL/L (ref 3.5–5.1)
PROTHROMBIN TIME: 14 SECONDS (ref 11.6–14.8)
RBC # BLD AUTO: 4.54 X10(6)UL
SODIUM SERPL-SCNC: 142 MMOL/L (ref 136–145)
WBC # BLD AUTO: 7 X10(3) UL (ref 4–11)

## 2023-12-04 PROCEDURE — 85610 PROTHROMBIN TIME: CPT

## 2023-12-04 PROCEDURE — 71046 X-RAY EXAM CHEST 2 VIEWS: CPT | Performed by: INTERNAL MEDICINE

## 2023-12-04 PROCEDURE — 85025 COMPLETE CBC W/AUTO DIFF WBC: CPT

## 2023-12-04 PROCEDURE — 80048 BASIC METABOLIC PNL TOTAL CA: CPT

## 2023-12-04 PROCEDURE — 36415 COLL VENOUS BLD VENIPUNCTURE: CPT

## 2023-12-13 ENCOUNTER — TELEPHONE (OUTPATIENT)
Dept: CARDIOLOGY CLINIC | Facility: HOSPITAL | Age: 73
End: 2023-12-13

## 2023-12-13 NOTE — TELEPHONE ENCOUNTER
[facility] Cardiology  Subjective:     Encounter Date: 12/14/2020      Patient ID: Favian Pierre is a 62 y.o. male.    Chief Complaint: Hypertension, Hyperlipidemia, and Pacemaker Check      PROBLEM LIST:  1. NICM  a. 11/2015 cardiomyopathy  b. 2016 [facility] ICD implant  c. EF 45% by LV gram 2018.  d. [facility], 8/2/2019: 20% RCA stenosis, otherwise normal coronary arteries. EF 45-50%.  2. Angina, microvascular  a. [facility] 2017 50% RCA stenosis with normal FFR 0.93  3. Hypertension  4. Dyslipidemia  5. Ongoing tobacco abuse  6. Hypothyroidism  7. Type 2 diabetes  8. Skin cancer with removal  9. Sleep apnea  10. Surgeries:  a. C-spine surgery  b. Right inguinal hernia repair  c. Right leg surger  d. Appendectomy  e. Lumbar fusion      History of Present Illness  Favian Pierre returns today for a follow up with a history of non-ischemic cardiomyopathy, angina, andcardiac risk factors. Since last visit, he has been feeling well overall from a cardiovascular standpoint. He got a new job with [employer] 1 year ago and has lost a considerable amount of weight. He has been running 6 miles daily. Patient denies chest pain, shortness of breath, palpitations, edema, dizziness, and syncope. He has no cardiopulmonary limitations. He is planning to move to [place], [place] with his girlfriend and desires to establish care with a cardiologist there.     Allergies   Allergen Reactions   • Percocet [Oxycodone-Acetaminophen] Itching         Current Outpatient Medications:   •  amLODIPine (NORVASC) 5 MG tablet, TAKE 1 TABLET DAILY (Patient taking differently: As Needed.), Disp: 90 tablet, Rfl: 0  •  aspirin 81 MG chewable tablet, Chew 1 tablet Daily., Disp: , Rfl: 11  •  atorvastatin (LIPITOR) 10 MG tablet, TAKE 1 TABLET DAILY, Disp: 90 tablet, Rfl: 1  •  bisoprolol (ZEBeta) 5 MG tablet, TAKE 1 TABLET DAILY, Disp: 90 tablet, Rfl: 3  •  glucose blood test strip, Use as instructed, Disp:  Spoke with daughter Nadine Nasrin has appointment with Twylla Councilman 12/28 and Dr. Jones Pillai 1/3 8:50 am and multiple other doctor appointments for Allie Christianson and her father and is trying to juggle work. She requested to see us 12/28 instead. Assisted to schedule. "100 each, Rfl: 12  •  JARDIANCE 25 MG tablet, TAKE 1 TABLET DAILY, Disp: 90 tablet, Rfl: 0  •  Lancets 28G misc, 1 each Daily., Disp: 100 each, Rfl: 0  •  levothyroxine (SYNTHROID) 75 MCG tablet, Take 1 tablet by mouth Daily., Disp: 30 tablet, Rfl: 11  •  metFORMIN ER (GLUCOPHAGE-XR) 500 MG 24 hr tablet, TAKE 2 TABLETS DAILY, Disp: 180 tablet, Rfl: 0  •  Needle, Disp, (BD SAFETYGLIDE NEEDLE) 18G X 1-1/2\" misc, Use to pull medication, Disp: 12 each, Rfl: 0  •  nitroglycerin (NITROSTAT) 0.6 MG SL tablet, DISSOLVE 1 TABLET UNDER THE TONGUE EVERY 5 MINUTES AS NEEDED FOR CHEST PAIN . TAKE NO MORE THAN 3 DOSES IN 15 MINUTES, Disp: 100 tablet, Rfl: 3  •  Syringe 22G X 1\" 3 ML misc, Use with testosterone injections, Disp: 8 each, Rfl: 11  •  Syringe 22G X 1\" 3 ML misc, Use to inject medication every 2 weeks, Disp: 12 each, Rfl: 0  •  Syringe/Needle, Disp, 18G X 1\" 1 ML misc, Use with testosterone injection, Disp: 8 each, Rfl: 11    The following portions of the patient's history were reviewed and updated as appropriate: allergies, current medications, past family history, past medical history, past social history, past surgical history and problem list.    Review of Systems   Constitution: Negative.   Cardiovascular: Negative for chest pain, dyspnea on exertion, leg swelling, palpitations and syncope.   Respiratory: Negative.  Negative for shortness of breath.    Hematologic/Lymphatic: Negative for bleeding problem. Does not bruise/bleed easily.   Skin: Negative for rash.   Musculoskeletal: Negative for muscle weakness and myalgias.   Gastrointestinal: Negative for heartburn, nausea and vomiting.   Neurological: Negative for dizziness, light-headedness, loss of balance and numbness.          Objective:     Vitals:    12/14/20 1532   BP: 132/66   BP Location: Right arm   Patient Position: Sitting   Pulse: 93   SpO2: 99%   Weight: 87.1 kg (192 lb)   Height: 182.9 cm (72\")         Constitutional:       Appearance: " Well-developed.   Neck:      Thyroid: No thyromegaly.      Vascular: No carotid bruit or JVD.   Pulmonary:      Breath sounds: Normal breath sounds.   Cardiovascular:      Regular rhythm.      No gallop. No S3 and S4 gallop.   Abdominal:      General: Bowel sounds are normal.      Palpations: Abdomen is soft. There is no abdominal mass.      Tenderness: There is no abdominal tenderness.   Skin:     General: Skin is warm and dry.      Findings: No rash.   Neurological:      Mental Status: Alert and oriented to person, place, and time.         Lab Review:  Lab Results   Component Value Date    GLUCOSE 152 (H) 01/22/2020     (H) 01/13/2020    BUN 12 01/22/2020    CREATININE 0.82 01/22/2020    EGFRIFNONA 96 01/22/2020    EGFRIFAFRI 81 01/13/2020    BCR 14.6 01/22/2020    K 4.6 01/22/2020    CO2 26.6 01/22/2020    CALCIUM 9.8 01/22/2020    ALBUMIN 4.40 08/02/2019    ALKPHOS 61 08/02/2019    AST 48 (H) 08/02/2019    ALT 65 (H) 08/02/2019     Lab Results   Component Value Date    CHOL 218 (H) 01/22/2020    TRIG 298 (H) 01/22/2020    HDL 40 01/22/2020     (H) 01/22/2020      Lab Results   Component Value Date    WBC 6.96 08/02/2019    RBC 5.24 08/02/2019    HGB 15.9 01/22/2020    HCT 45.1 01/22/2020    MCV 89.9 08/02/2019     08/02/2019     Lab Results   Component Value Date    TSH 7.410 (H) 01/22/2020     Lab Results   Component Value Date    HGBA1C 6.80 (H) 01/22/2020        Procedures    MANUAL DEVICE INTERROGATION: 12/14/2020, [facility] BIV-ICD:   RA pacing 5%, RV pacing 99%, LV pacing 99%.  RA: P wave is 6.0 mV with a threshold of 0.3 V at 0.5 msec and an impedance of 559 ohms.   RV: R wave is 21.7 mV with a threshold of 0.9 V at 0.5 msec and an impedance of 572 ohms.  LV: R wave is 20.4 mV with a threshold of 1.0 V at 0.6 msec and an impedance of 1338 ohms.  Battery voltage is 6 years  Events: 0  Device updates: 0          Assessment:   Diagnoses and all orders for this visit:    1. Acute diastolic CHF  (congestive heart failure) (CMS/HCC) (Primary)  -     Adult Transthoracic Echo Complete W/ Cont if Necessary Per Protocol; Future    2. NICM (nonischemic cardiomyopathy) (CMS/HCC)  -     Adult Transthoracic Echo Complete W/ Cont if Necessary Per Protocol; Future    3. Essential hypertension    4. Mixed hyperlipidemia        Impression:  1. CHF: EF 45-50% in 2019. Stable and asymptomatic.  2. NICM: EF 45-50% in 2019. Stable and asymptomatic.  3. Hypertension: Well controlled on amlodipine and bisoprolol.  4. Hyperlipidemia: Poor control on atorvastatin.   5. Adult onset diabetes, significantly improved with weight loss and exercise.    Plan:  1. 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  2. We will continue his biventricular pacing and all current medical therapy  3. Continue current medications.  4. Follow up with Dr. Joselito Corral in [place], [place].  We will send records.    Scribed for Jalil Guillen MD by Carson Lomeli. 12/14/2020  16:54 EST       Jalil Guillen MD      Please note that portions of this note may have been completed with a voice recognition program. Efforts were made to edit the dictations, but occasionally words are mistranscribed.

## 2023-12-13 NOTE — TELEPHONE ENCOUNTER
S/w mannie and explain clinic and looking to set up appt for first week of January .  She will contact her DTR who would be bring here and call back to schedule,

## 2023-12-19 ENCOUNTER — HOSPITAL ENCOUNTER (OUTPATIENT)
Dept: INTERVENTIONAL RADIOLOGY/VASCULAR | Facility: HOSPITAL | Age: 73
Discharge: HOME | End: 2023-12-19
Attending: INTERNAL MEDICINE | Admitting: INTERNAL MEDICINE
Payer: MEDICARE

## 2023-12-19 VITALS
DIASTOLIC BLOOD PRESSURE: 56 MMHG | RESPIRATION RATE: 14 BRPM | TEMPERATURE: 98 F | HEART RATE: 46 BPM | BODY MASS INDEX: 34.8 KG/M2 | HEIGHT: 69 IN | OXYGEN SATURATION: 97 % | SYSTOLIC BLOOD PRESSURE: 123 MMHG | WEIGHT: 235 LBS

## 2023-12-19 DIAGNOSIS — I50.20 HFREF (HEART FAILURE WITH REDUCED EJECTION FRACTION) (HCC): ICD-10-CM

## 2023-12-19 DIAGNOSIS — R79.89 ELEVATED BRAIN NATRIURETIC PEPTIDE (BNP) LEVEL: ICD-10-CM

## 2023-12-19 LAB
ATRIAL RATE: 47 BPM
GLUCOSE BLDC GLUCOMTR-MCNC: 104 MG/DL (ref 70–99)
P AXIS: 7 DEGREES
P-R INTERVAL: 124 MS
Q-T INTERVAL: 496 MS
QRS DURATION: 106 MS
QTC CALCULATION (BEZET): 438 MS
R AXIS: -15 DEGREES
T AXIS: -15 DEGREES
VENTRICULAR RATE: 47 BPM

## 2023-12-19 PROCEDURE — 02HR30Z INSERTION OF PRESSURE SENSOR MONITORING DEVICE INTO LEFT PULMONARY ARTERY, PERCUTANEOUS APPROACH: ICD-10-PCS | Performed by: INTERNAL MEDICINE

## 2023-12-19 PROCEDURE — 93005 ELECTROCARDIOGRAM TRACING: CPT

## 2023-12-19 PROCEDURE — 93010 ELECTROCARDIOGRAM REPORT: CPT | Performed by: INTERNAL MEDICINE

## 2023-12-19 PROCEDURE — 36415 COLL VENOUS BLD VENIPUNCTURE: CPT

## 2023-12-19 PROCEDURE — 33289 TCAT IMPL WRLS P-ART PRS SNR: CPT | Performed by: INTERNAL MEDICINE

## 2023-12-19 PROCEDURE — 99152 MOD SED SAME PHYS/QHP 5/>YRS: CPT | Performed by: INTERNAL MEDICINE

## 2023-12-19 PROCEDURE — 82962 GLUCOSE BLOOD TEST: CPT

## 2023-12-19 RX ORDER — CLOPIDOGREL BISULFATE 75 MG/1
TABLET ORAL
Status: COMPLETED
Start: 2023-12-19 | End: 2023-12-19

## 2023-12-19 RX ORDER — CLOPIDOGREL BISULFATE 75 MG/1
300 TABLET ORAL ONCE
Status: COMPLETED | OUTPATIENT
Start: 2023-12-19 | End: 2023-12-19

## 2023-12-19 RX ORDER — HEPARIN SODIUM 1000 [USP'U]/ML
INJECTION, SOLUTION INTRAVENOUS; SUBCUTANEOUS
Status: COMPLETED
Start: 2023-12-19 | End: 2023-12-19

## 2023-12-19 RX ORDER — CLOPIDOGREL BISULFATE 75 MG/1
75 TABLET ORAL DAILY
Status: DISCONTINUED | OUTPATIENT
Start: 2023-12-19 | End: 2023-12-19

## 2023-12-19 RX ORDER — LIDOCAINE HYDROCHLORIDE 20 MG/ML
INJECTION, SOLUTION EPIDURAL; INFILTRATION; INTRACAUDAL; PERINEURAL
Status: COMPLETED
Start: 2023-12-19 | End: 2023-12-19

## 2023-12-19 RX ORDER — SODIUM CHLORIDE 9 MG/ML
INJECTION, SOLUTION INTRAVENOUS
Status: DISCONTINUED | OUTPATIENT
Start: 2023-12-19 | End: 2023-12-19

## 2023-12-19 RX ORDER — LISINOPRIL 40 MG/1
20 TABLET ORAL DAILY
Status: SHIPPED | COMMUNITY
Start: 2023-12-19 | End: 2023-12-28

## 2023-12-19 RX ORDER — MIDAZOLAM HYDROCHLORIDE 1 MG/ML
INJECTION INTRAMUSCULAR; INTRAVENOUS
Status: COMPLETED
Start: 2023-12-19 | End: 2023-12-19

## 2023-12-19 RX ORDER — ASPIRIN 81 MG/1
324 TABLET, CHEWABLE ORAL ONCE
Status: DISCONTINUED | OUTPATIENT
Start: 2023-12-19 | End: 2023-12-19

## 2023-12-19 RX ORDER — CHLORHEXIDINE GLUCONATE 4 G/100ML
30 SOLUTION TOPICAL
Status: DISCONTINUED | OUTPATIENT
Start: 2023-12-19 | End: 2023-12-19

## 2023-12-19 RX ORDER — CLOPIDOGREL BISULFATE 75 MG/1
75 TABLET ORAL DAILY
Qty: 90 TABLET | Refills: 0 | Status: SHIPPED | OUTPATIENT
Start: 2023-12-19 | End: 2024-03-18

## 2023-12-19 RX ADMIN — CLOPIDOGREL BISULFATE 300 MG: 75 TABLET ORAL at 15:56:00

## 2023-12-19 NOTE — IVS NOTE
DISCHARGE NOTE     Pt is able to sit up and ambulate without difficulty. Pt voided and tolerated fluids and food. Procedural site remains dry and intact with good circulation, motion, and sensation. No signs and symptoms of bleeding/hematoma noted. IV access removed  Instruction provided, patient/family verbalizes understanding. Dr. Jones Pillai spoke with patient/family post procedure.      Pt discharge via wheelchair to [address] [place] [address] B     Follow up Appointment: as scheduled    New Prescription: as prescribed

## 2023-12-19 NOTE — IVS NOTE
Handoff report given to Vandana   Pt denies any pain or discomfort  Procedural site dry and intact, no bleeding or swelling noted  Activity restriction and bedrest status reviewed  Discharge paper given and discussed with patient and her daughter. Patient verbalized understanding.

## 2023-12-19 NOTE — PROCEDURES
[facility]  Cardiac Cath Procedure Note  Gaylia Staff Patient Status:  Outpatient   1950 MRN [id number]  Location [facility] Attending Galdino Hendrix MD  Hosp Day # 0 PCP Kori Sanders MD      Cardiologist: Virgie Napier MD  Primary Proceduralist: Virgie Napier MD  Procedure Performed: RHC and PA angiogram, cardiomems implant  Date of Procedure: 2023   Indication: heart failure    Summary of findings:    Successful cardiomems implant      Hemodynamics:   RA 5  RV 39/7  PA 36/6 mean 20  PCW 10    CO 5 / CI 2.2  SVR 1486 /       Recommendations:  Reduce diuretics to avoid LINDA      Description of Procedure:   After written informed consent was obtained from the patient, patient was brought to the cardiac catheterization laboratory. Patient was prepped and draped in the usual sterile fashion. Lidocaine 1% was used to infiltrate the right groin for local anesthesia and femoral vein was dilated to 11 [place] for sheath. Monitoring swan advanced to RA and pressures recorded in RA, RV, PA and wedge positions under fluoroscopic and hemodynamic guidance. Selective left PA angiogram done in slight Thai projection. Steel core 0.18 wire placed into distal artery. Cardiomems sensor was delivered over exchange wire to desired implant location at PA site greater than 7 mm. Post deployment calibration was performed. No complications noted at end of procedure       Specimen sent to: No specimen collected  Estimated blood loss: 10 cc  Closure: Vascade      IV was maintained by RN and moderate conscious sedation of versed and fentanyl was given. Patient was assessed and monitoring of oxygen, heart rate and blood pressure by nurse and myself during the exam 35 minutes.       Virgie Napier MD  23

## 2023-12-27 ENCOUNTER — TELEPHONE (OUTPATIENT)
Dept: CARDIOLOGY CLINIC | Facility: HOSPITAL | Age: 73
End: 2023-12-27

## 2023-12-27 ENCOUNTER — HOSPITAL ENCOUNTER (OUTPATIENT)
Dept: CT IMAGING | Facility: HOSPITAL | Age: 73
Discharge: HOME | End: 2023-12-27
Attending: INTERNAL MEDICINE
Payer: MEDICARE

## 2023-12-27 DIAGNOSIS — I51.89 DIASTOLIC DYSFUNCTION: Primary | ICD-10-CM

## 2023-12-27 DIAGNOSIS — R91.8 LUNG NODULES: ICD-10-CM

## 2023-12-27 PROCEDURE — 71250 CT THORAX DX C-: CPT | Performed by: INTERNAL MEDICINE

## 2023-12-27 NOTE — TELEPHONE ENCOUNTER
Message left for daughter Anna Becerra with appointment reminder, parking and check in instructions. BMP/BNP by RN. Orders entered. Spoke with [place] and same message given.

## 2023-12-28 ENCOUNTER — OFFICE VISIT (OUTPATIENT)
Dept: CARDIOLOGY CLINIC | Facility: HOSPITAL | Age: 73
End: 2023-12-28
Attending: NURSE PRACTITIONER
Payer: MEDICARE

## 2023-12-28 VITALS
OXYGEN SATURATION: 100 % | WEIGHT: 238.69 LBS | SYSTOLIC BLOOD PRESSURE: 152 MMHG | DIASTOLIC BLOOD PRESSURE: 78 MMHG | HEART RATE: 57 BPM | BODY MASS INDEX: 35 KG/M2

## 2023-12-28 DIAGNOSIS — I51.89 DIASTOLIC DYSFUNCTION: ICD-10-CM

## 2023-12-28 DIAGNOSIS — I50.32 CHRONIC DIASTOLIC HF (HEART FAILURE) (HCC): Primary | ICD-10-CM

## 2023-12-28 DIAGNOSIS — I50.32 CHRONIC HEART FAILURE WITH PRESERVED EJECTION FRACTION (HFPEF) (HCC): ICD-10-CM

## 2023-12-28 DIAGNOSIS — N18.30 CKD STAGE 3 DUE TO TYPE 2 DIABETES MELLITUS (HCC): ICD-10-CM

## 2023-12-28 DIAGNOSIS — Z95.820 S/P ANGIOPLASTY WITH STENT: ICD-10-CM

## 2023-12-28 DIAGNOSIS — E11.22 CKD STAGE 3 DUE TO TYPE 2 DIABETES MELLITUS (HCC): ICD-10-CM

## 2023-12-28 DIAGNOSIS — I10 PRIMARY HYPERTENSION: ICD-10-CM

## 2023-12-28 LAB
ANION GAP SERPL CALC-SCNC: 4 MMOL/L (ref 0–18)
BNP SERPL-MCNC: 100 PG/ML
BUN BLD-MCNC: 15 MG/DL (ref 9–23)
BUN/CREAT SERPL: 14.2 (ref 10–20)
CALCIUM BLD-MCNC: 9.3 MG/DL (ref 8.7–10.4)
CHLORIDE SERPL-SCNC: 110 MMOL/L (ref 98–112)
CO2 SERPL-SCNC: 28 MMOL/L (ref 21–32)
CREAT BLD-MCNC: 1.06 MG/DL
EGFRCR SERPLBLD CKD-EPI 2021: 56 ML/MIN/1.73M2 (ref 60–?)
FASTING STATUS PATIENT QL REPORTED: YES
GLUCOSE BLD-MCNC: 120 MG/DL (ref 70–99)
OSMOLALITY SERPL CALC.SUM OF ELEC: 296 MOSM/KG (ref 275–295)
POTASSIUM SERPL-SCNC: 3.8 MMOL/L (ref 3.5–5.1)
SODIUM SERPL-SCNC: 142 MMOL/L (ref 136–145)

## 2023-12-28 PROCEDURE — 99205 OFFICE O/P NEW HI 60 MIN: CPT | Performed by: NURSE PRACTITIONER

## 2023-12-28 RX ORDER — LISINOPRIL 40 MG/1
40 TABLET ORAL DAILY
Qty: 30 TABLET | Refills: 5 | Status: SHIPPED | OUTPATIENT
Start: 2023-12-28

## 2023-12-28 RX ORDER — FAMOTIDINE 10 MG
10 TABLET ORAL
COMMUNITY

## 2023-12-28 NOTE — PATIENT INSTRUCTIONS
Stop taking nebivolol/Bystolic    Increase lisinopril to 40 mg daily    Continue furosemide 40 mg 3 times weekly taking Monday, Wednesdays and Fridays. Continue all your other medications as prescribed     Call if you are having shortness of breath, cough, wheezing, chest pain, dizziness, lightheadedness, heart racing, palpitations, swelling, rapid weight gain, fatigue, weakness, fever or chills. Call 911 with severe shortness of breath, chest pain or chest pressure not improved after 15 minutes of rest or if feeling faint,  passing out or having a fall     Weigh yourself daily in the morning before breakfast, call if gaining 3 lbs or more overnight or more than 5 lbs in one week. Follow 2000 mg sodium restricted, heart healthy diet. Keep daily fluids at 48-64 ounces per day (1.5-2 liters of liquid or 6-8 , 8 oz glasses of liquid)    Follow up with Dr. Samantha Ojeda on 1/3/24    Follow up with the specialty care clinic in 3 weeks     Wear knee-high compression stockings during the day and remove at night    Bring your home blood pressure machine to Dr. Castillo Finger visit next week to compare home readings to clinic readings. Repeated blood test for basic metabolic panel in 1 week just before seeing Dr. Samantha Ojeda      Limit sodium to  2000 mg daily. Common high sodium foods include frozen dinners, soups (not homemade), some cereal, vegetable juice, canned vegetables, lunch meats, processed meats like hotdogs, sausage, bruce, pepperoni, soy sauce, pre-packaged rice or potatoes. Please remember to read nutrition labels for sodium content.     www.healthyeating. nhlbi.nih.gov      Exercise daily as tolerated, with goal of doing moderate aerobic exercise like walking for about 30 minutes 5 days per week. Start by walking at a slow to moderate pace for 3-5 minutes 2-3 times a day. Pace your activity to prevent shortness of breath or fatigue.  Stop exercise if you develop chest pain, lightheadedness, or significant shortness of breath

## 2023-12-28 NOTE — PROGRESS NOTES
Here with daughter Jocelyne Osorio. [place] and daughter report medication changes due to kidney functions. Subjective: Denies lightheadedness, or dizziness, chest pain or palpitations or shortness of breath. Reports occasionally feeling bloated, not today and occasional leg swelling. She reports coldness in her fingers since starting [place]. Weight:  Today 238.7 lb   Home does not weight regularly  Labs completed:  by RN BMP/BNP  Device:  CardioMEMS Interrogation AT HOME reviewed reading PAD 12    IV placed:  NO  Measurements:  RLE Calf: 16.5\" Ankle: 13\"    LLE Calf: 17.5\" Ankle: 11\"    Patient and medication assessed. Discussed with APN. Treatments completed venipuncture. Medications given NONE. Extensive education of disease management including low sodium diet, fluid restriction, heart failure booklet, Eat to Live handout, Jardiance financial support instructions. Reviewed AVS instructions. Patient verbalized understanding.

## 2023-12-28 NOTE — PROGRESS NOTES
Dtefraín and Wendy Huntington called back to confirm medications, called and s/w with them and confirmed she is taking the carvedilol 25 mg twice a day, she thought it was the same medication as bystolic and so wrote bystolic on carvedilol bottle. Instructed they are different medication. States understanding of this. Will continue to increase the lisinopril as instructed to 40 mg daily. Continuing furosemide 40 mg 3 times a week. Discussed importance of accurate medication list. Repeated instructins correctly.

## 2023-12-29 PROBLEM — I50.32 CHRONIC HEART FAILURE WITH PRESERVED EJECTION FRACTION (HFPEF) (HCC): Status: ACTIVE | Noted: 2023-12-29

## 2024-01-02 ENCOUNTER — LAB ENCOUNTER (OUTPATIENT)
Dept: LAB | Facility: HOSPITAL | Age: 74
End: 2024-01-02
Attending: NURSE PRACTITIONER
Payer: MEDICARE

## 2024-01-02 DIAGNOSIS — I50.32 CHRONIC DIASTOLIC HF (HEART FAILURE) (HCC): ICD-10-CM

## 2024-01-02 LAB
ANION GAP SERPL CALC-SCNC: 1 MMOL/L (ref 0–18)
BUN BLD-MCNC: 15 MG/DL (ref 9–23)
BUN/CREAT SERPL: 13.8 (ref 10–20)
CALCIUM BLD-MCNC: 9.5 MG/DL (ref 8.7–10.4)
CHLORIDE SERPL-SCNC: 110 MMOL/L (ref 98–112)
CO2 SERPL-SCNC: 29 MMOL/L (ref 21–32)
CREAT BLD-MCNC: 1.09 MG/DL
EGFRCR SERPLBLD CKD-EPI 2021: 54 ML/MIN/1.73M2 (ref 60–?)
FASTING STATUS PATIENT QL REPORTED: YES
GLUCOSE BLD-MCNC: 120 MG/DL (ref 70–99)
OSMOLALITY SERPL CALC.SUM OF ELEC: 292 MOSM/KG (ref 275–295)
POTASSIUM SERPL-SCNC: 3.4 MMOL/L (ref 3.5–5.1)
SODIUM SERPL-SCNC: 140 MMOL/L (ref 136–145)

## 2024-01-02 PROCEDURE — 36415 COLL VENOUS BLD VENIPUNCTURE: CPT

## 2024-01-02 PROCEDURE — 80048 BASIC METABOLIC PNL TOTAL CA: CPT

## 2024-01-09 DIAGNOSIS — I50.32 CHRONIC DIASTOLIC HF (HEART FAILURE) (HCC): Primary | ICD-10-CM

## 2024-01-16 ENCOUNTER — OFFICE VISIT (OUTPATIENT)
Dept: ENDOCRINOLOGY CLINIC | Facility: CLINIC | Age: 74
End: 2024-01-16

## 2024-01-16 VITALS
HEART RATE: 60 BPM | WEIGHT: 237 LBS | SYSTOLIC BLOOD PRESSURE: 125 MMHG | DIASTOLIC BLOOD PRESSURE: 68 MMHG | HEIGHT: 69.02 IN | BODY MASS INDEX: 35.1 KG/M2

## 2024-01-16 DIAGNOSIS — E11.22 CONTROLLED TYPE 2 DIABETES MELLITUS WITH CHRONIC KIDNEY DISEASE, WITHOUT LONG-TERM CURRENT USE OF INSULIN, UNSPECIFIED CKD STAGE (HCC): Primary | ICD-10-CM

## 2024-01-16 LAB
GLUCOSE BLOOD: 136
TEST STRIP LOT #: NORMAL NUMERIC

## 2024-01-16 PROCEDURE — 3078F DIAST BP <80 MM HG: CPT

## 2024-01-16 PROCEDURE — 82947 ASSAY GLUCOSE BLOOD QUANT: CPT

## 2024-01-16 PROCEDURE — 99214 OFFICE O/P EST MOD 30 MIN: CPT

## 2024-01-16 PROCEDURE — 1159F MED LIST DOCD IN RCRD: CPT

## 2024-01-16 PROCEDURE — 1160F RVW MEDS BY RX/DR IN RCRD: CPT

## 2024-01-16 PROCEDURE — 3008F BODY MASS INDEX DOCD: CPT

## 2024-01-16 PROCEDURE — 3074F SYST BP LT 130 MM HG: CPT

## 2024-01-29 ENCOUNTER — TELEPHONE (OUTPATIENT)
Dept: CARDIOLOGY CLINIC | Facility: HOSPITAL | Age: 74
End: 2024-01-29

## 2024-01-29 NOTE — TELEPHONE ENCOUNTER
CardioMEMS goal 15-18  Readings have been 11-14 in the last 2 weeks  No readings Friday, Saturday or Sunday. Today's PAD 9.    Message left for daughter Dolores, HIPAA verified of recent PADs including today's reading of 9. Recommended Rosita is drinking minimum of 48 ounces - 64 ounces of fluid daily to stay hydrated. Number left to call with any questions or concerns.

## 2024-02-01 DIAGNOSIS — I50.32 CHRONIC DIASTOLIC HF (HEART FAILURE) (HCC): Primary | ICD-10-CM

## 2024-02-05 ENCOUNTER — TELEPHONE (OUTPATIENT)
Dept: CARDIOLOGY CLINIC | Facility: HOSPITAL | Age: 74
End: 2024-02-05

## 2024-02-05 ENCOUNTER — LAB ENCOUNTER (OUTPATIENT)
Dept: LAB | Facility: HOSPITAL | Age: 74
End: 2024-02-05
Attending: NURSE PRACTITIONER
Payer: MEDICARE

## 2024-02-05 DIAGNOSIS — I50.32 CHRONIC DIASTOLIC HF (HEART FAILURE) (HCC): ICD-10-CM

## 2024-02-05 LAB
ANION GAP SERPL CALC-SCNC: 5 MMOL/L (ref 0–18)
BUN BLD-MCNC: 16 MG/DL (ref 9–23)
BUN/CREAT SERPL: 15.5 (ref 10–20)
CALCIUM BLD-MCNC: 9.2 MG/DL (ref 8.7–10.4)
CHLORIDE SERPL-SCNC: 111 MMOL/L (ref 98–112)
CO2 SERPL-SCNC: 28 MMOL/L (ref 21–32)
CREAT BLD-MCNC: 1.03 MG/DL
EGFRCR SERPLBLD CKD-EPI 2021: 58 ML/MIN/1.73M2 (ref 60–?)
FASTING STATUS PATIENT QL REPORTED: YES
GLUCOSE BLD-MCNC: 128 MG/DL (ref 70–99)
OSMOLALITY SERPL CALC.SUM OF ELEC: 301 MOSM/KG (ref 275–295)
POTASSIUM SERPL-SCNC: 3.7 MMOL/L (ref 3.5–5.1)
SODIUM SERPL-SCNC: 144 MMOL/L (ref 136–145)

## 2024-02-05 PROCEDURE — 80048 BASIC METABOLIC PNL TOTAL CA: CPT

## 2024-02-05 PROCEDURE — 36415 COLL VENOUS BLD VENIPUNCTURE: CPT

## 2024-02-05 NOTE — TELEPHONE ENCOUNTER
Kidney function and potassium level are normal. Cardiomem reading has also been stable in the normal range from 13-15 mmhg, goal range 12-18 mmhg.

## 2024-02-06 ENCOUNTER — OFFICE VISIT (OUTPATIENT)
Dept: CARDIOLOGY CLINIC | Facility: HOSPITAL | Age: 74
End: 2024-02-06
Attending: NURSE PRACTITIONER
Payer: MEDICARE

## 2024-02-06 ENCOUNTER — APPOINTMENT (OUTPATIENT)
Dept: LAB | Facility: HOSPITAL | Age: 74
End: 2024-02-06
Attending: NURSE PRACTITIONER
Payer: MEDICARE

## 2024-02-06 VITALS
BODY MASS INDEX: 36 KG/M2 | WEIGHT: 240.88 LBS | RESPIRATION RATE: 16 BRPM | HEART RATE: 61 BPM | DIASTOLIC BLOOD PRESSURE: 59 MMHG | SYSTOLIC BLOOD PRESSURE: 129 MMHG | OXYGEN SATURATION: 99 %

## 2024-02-06 DIAGNOSIS — I10 PRIMARY HYPERTENSION: ICD-10-CM

## 2024-02-06 DIAGNOSIS — Z95.820 S/P ANGIOPLASTY WITH STENT: ICD-10-CM

## 2024-02-06 DIAGNOSIS — I50.32 CHRONIC DIASTOLIC HF (HEART FAILURE) (HCC): ICD-10-CM

## 2024-02-06 DIAGNOSIS — E11.22 CKD STAGE 3 DUE TO TYPE 2 DIABETES MELLITUS (HCC): ICD-10-CM

## 2024-02-06 DIAGNOSIS — I50.32 CHRONIC HEART FAILURE WITH PRESERVED EJECTION FRACTION (HFPEF) (HCC): Primary | ICD-10-CM

## 2024-02-06 DIAGNOSIS — N18.30 CKD STAGE 3 DUE TO TYPE 2 DIABETES MELLITUS (HCC): ICD-10-CM

## 2024-02-06 PROCEDURE — 99215 OFFICE O/P EST HI 40 MIN: CPT | Performed by: NURSE PRACTITIONER

## 2024-02-06 NOTE — PATIENT INSTRUCTIONS
Continue all your  medications as prescribed     Call if you are having shortness of breath, cough, wheezing, chest pain, dizziness, lightheadedness, heart racing, palpitations, swelling, rapid weight gain, fatigue, weakness, fever or chills.     Call 911 with severe shortness of breath, chest pain or chest pressure not improved after 15 minutes of rest or if feeling faint,  passing out or having a fall     Weigh yourself daily in the morning before breakfast, call if gaining 3 lbs or more overnight or more than 5 lbs in one week.    Follow 2000 mg sodium restricted, heart healthy diet.     Keep daily fluids at 48-64 ounces per day (1.5-2 liters of liquid or 6-8 , 8 oz glasses of liquid)    Follow up with Dr. Saldana in April as scheduled     Follow up with the specialty care clinic on 6/4/24         Limit sodium to  2000 mg daily. Common high sodium foods include frozen dinners, soups (not homemade), some cereal, vegetable juice, canned vegetables, lunch meats, processed meats like hotdogs, sausage, bruce, pepperoni, soy sauce, pre-packaged rice or potatoes. Please remember to read nutrition labels for sodium content.     www.healthyeating.nhlbi.nih.gov      Exercise daily as tolerated, with goal of doing moderate aerobic exercise like walking for about 30 minutes 5 days per week. Start by walking at a slow to moderate pace for 3-5 minutes 2-3 times a day. Pace your activity to prevent shortness of breath or fatigue. Stop exercise if you develop chest pain, lightheadedness, or significant shortness of breath

## 2024-02-06 NOTE — PROGRESS NOTES
Subjective: Rosita is here and feeling fatigued. Denies CALVO.     Weight:  Today 240.9 lb    Home -238#  Last visit 238#  Labs completed : at lab - BMP   Device:  CardioMEMS Interrogation - done at home, * reviewed reading -PAD is 12, goal is 15-18.  IV placed:  no   Measurements :  RLE Calf- 16.9\", ankle- 12\"     LLE :calf- 17\", ankle- 11.5\"      Patient and medication assessed. Discussed with APN. Treatments completed- leg measurements, Cardiomem review,  .  Medications given- none . Extensive education of disease management .  Reviewed AVS instructions. Patient verbalized understanding.

## 2024-02-06 NOTE — PROGRESS NOTES
[facility]    Rosita Lemus Patient Status:  No patient class for patient encounter    1950 MRN [id number]   Location [facility] MD Dr. Shana Guzmán is a 73 year old female who presents to clinic for assessment and management for hospitalization for acute on chronic heart failure.    Admitted for elective cardiomems procedure 23.  RA 5, RV 39/7, PA 36/6 mean 20, PCW 10, CO 5 / CI 2.2, SVR 1486 /   Hypovolemic. Reduce diuretics to avoid LINDA. Started on plavix 75 mg daily.        Problem List:  Chronic HFpEF, NYHA class III, Stage C, EF 55-60%  Cardiomems 23, Goal range 12-18 mmHg   CKD   CAD with PCI, FAUZIA of LAD x2 2022  DM type 2, on insulin  HTN  HLD    Subjective:  Here with family. Has not taken meds yet today.  Chronic stephanie LE edema at baseline, has compression stockings but not wearing.     -140's  at home.   No logan or orthopnea with 2 pillows   Denies cp or dizziness    Review of Systems:  Constitutional: negative for chills or fever  Respiratory:  negative for cough, hemoptysis and wheezing  Cardiovascular: negative for chest pain, exertional chest pressure/discomfort, near-syncope, orthopnea and palpitations  Gastrointestinal: negative for abdominal pain, diarrhea, melena, nausea and vomiting  Hematologic/lymphatic: negative  Musculoskeletal: negative for muscle weakness and myalgias    Objective:  Lab Results   Component Value Date/Time    WBC 7.0 2023 07:19 AM    HGB 12.7 2023 07:19 AM    HCT 40.1 2023 07:19 AM    .0 2023 07:19 AM    CREATSERUM 1.03 (H) 2024 07:47 AM    BUN 16 2024 07:47 AM     2024 07:47 AM    K 3.7 2024 07:47 AM     2024 07:47 AM    CO2 28.0 2024 07:47 AM     (H) 2024 07:47 AM    CA 9.2 2024 07:47 AM    ALB 3.6 2022 08:49 AM    ALB 4.00 2022 08:49 AM    ALKPHO 88  2022 08:49 AM    BILT 0.3 2022 08:49 AM    TP 7.2 2022 08:49 AM    TP 7.3 2022 08:49 AM    AST 13 (L) 2022 08:49 AM    ALT 18 2022 08:49 AM    INR 1.02 2023 07:19 AM    PTP 14.0 2023 07:19 AM    T4F 1.0 2021 07:42 AM    TSH 2.170 2021 07:42 AM    ESRML 20 2022 08:49 AM    CRP <0.29 2022 08:49 AM    PHOS 3.3 2022 08:49 AM    B12 692 10/14/2021 06:32 AM    PGLU 104 (H) 2023 01:15 PM       Labs drawn by  lab: tete, results reviewed with patient.  Personally interpreted results.     /59 (BP Location: Right arm, Patient Position: Sitting)   Pulse 61   Resp 16   Wt 240 lb 14.4 oz (109.3 kg)   SpO2 99%   BMI 35.56 kg/m²       Date  Clinic wt Home wt MCI wt DC wt IV med  cardiomems   23   235      23 238        1/3/24   236      24 240 238     12 mmHg              General appearance: alert, appears stated age and cooperative  Neck: no JVD at 90 degrees  Lungs: clear to auscultation bilaterally  Heart: S1, S2 normal, no murmur, click, rub or gallop, regular rate and rhythm- HR 50 bpm   Abdomen: soft, non-tender; bowel sounds normal; no masses,  no abdominal distension  Extremities: extremities normal, atraumatic, no cyanosis, trace-+1 stephanie ankle edema  Pulses: 2+ and symmetric  Neurologic: Grossly normal  R groin DI , no swelling or ecchymosis  Measurements:  RLE Calf: 16.5\" Ankle: 13\"    LLE Calf: 17.5\" Ankle: 11\"     Diagnostics:  EC23 sinus taryn 47 bpm.  Echocardiogram: 2023, EF 55-60%, no wma, + diastolic dysfunction, mod stephanie atrial dilation, mild MR, mild/mod TR, RA 8 mmHg. PA 35 mmhg  Heart cath: 2022 FAUZIA X2 to LAD , PCI   RHC: 23  RA 5  RV 39/7  PA 36/6 mean 20  PCW 10     CO 5 / CI 2.2  SVR 1486 /     Recommendations:  Reduce diuretics to avoid LINDA       Devices:   [  ]ICD  [ ]BIV-ICD  [  ]PPM  [  ]Life Vest  [x  ]CardioMEMS      Assessment:  Acute on chronic HFp EF, NYHA class  III, stage C  -EF 55-60%  -s/p cardiomems insertion 12/19/23, on the dry side , PA 36/6 mean 20, PCW 10, Rex-5.   -diuretics: furosemide 40 mg three times weekly  -GDMT: coreg, lisinopril, jardiance  -PAD 12 mmHg, ranging 12-15 mmHg in last few weeks, rare low of 9 mmHg  -goal 12-18   -weight stable  - BP normal today on increased lisinopril  -Renal function normal, bun/cr:16/1.03<- 15/1.06  -euvolemic     CAD with PCI of LAD 1/2022  -on asa, plavix, statin    CKD stage IIIa  -renal function normal/stable  - sees Dr. Whitehead     HTN  -BP normal    -continue amlodipine, coreg, jardiance,  lisinopril, furosemide    Plan:     Patient Instructions     Continue all your  medications as prescribed     Call if you are having shortness of breath, cough, wheezing, chest pain, dizziness, lightheadedness, heart racing, palpitations, swelling, rapid weight gain, fatigue, weakness, fever or chills.     Call 911 with severe shortness of breath, chest pain or chest pressure not improved after 15 minutes of rest or if feeling faint,  passing out or having a fall     Weigh yourself daily in the morning before breakfast, call if gaining 3 lbs or more overnight or more than 5 lbs in one week.    Follow 2000 mg sodium restricted, heart healthy diet.     Keep daily fluids at 48-64 ounces per day (1.5-2 liters of liquid or 6-8 , 8 oz glasses of liquid)    Follow up with Dr. Saldana in April as scheduled     Follow up with the specialty care clinic on 6/4/24         Limit sodium to  2000 mg daily. Common high sodium foods include frozen dinners, soups (not homemade), some cereal, vegetable juice, canned vegetables, lunch meats, processed meats like hotdogs, sausage, bruce, pepperoni, soy sauce, pre-packaged rice or potatoes. Please remember to read nutrition labels for sodium content.     www.healthyeating.nhlbi.nih.gov      Exercise daily as tolerated, with goal of doing moderate aerobic exercise like walking for about 30 minutes 5 days per  week. Start by walking at a slow to moderate pace for 3-5 minutes 2-3 times a day. Pace your activity to prevent shortness of breath or fatigue. Stop exercise if you develop chest pain, lightheadedness, or significant shortness of breath                            Current Outpatient Medications:     famotidine 10 MG Oral Tab, Take 1 tablet (10 mg total) by mouth daily as needed for Heartburn., Disp: , Rfl:     lisinopril 40 MG Oral Tab, Take 1 tablet (40 mg total) by mouth daily., Disp: 30 tablet, Rfl: 5    Elastic Bandages & Supports (MEDICAL COMPRESSION STOCKINGS) Does not apply Misc, 1 each daily. Knee high 15-20 mmhg wear during the day off at night, Disp: 1 each, Rfl: 0    clopidogrel 75 MG Oral Tab, Take 1 tablet (75 mg total) by mouth daily., Disp: 90 tablet, Rfl: 0    empagliflozin 10 MG Oral Tab, Take 1 tablet (10 mg total) by mouth daily., Disp: , Rfl:     amLODIPine 10 MG Oral Tab, Take 1 tablet (10 mg total) by mouth at bedtime., Disp: , Rfl:     ROSUVASTATIN 40 MG Oral Tab, TAKE ONE TABLET BY MOUTH DAILY, Disp: 90 tablet, Rfl: 0    ACCU-CHEK SOFTCLIX LANCETS Does not apply Misc, TEST BLOOD GLUCOSE ONCE DAILY, Disp: 100 each, Rfl: 3    CARVEDILOL 25 MG Oral Tab, TAKE ONE TABLET BY MOUTH TWICE A DAY WITH MEALS, Disp: 180 tablet, Rfl: 1    metFORMIN 500 MG Oral Tab, Take 1 tablet (500 mg total) by mouth 2 (two) times daily with meals., Disp: 180 tablet, Rfl: 1    aspirin 81 MG Oral Tab EC, Take 1 tablet (81 mg total) by mouth daily., Disp: , Rfl:     SITagliptin Phosphate (JANUVIA) 50 MG Oral Tab, Take 1 tablet (50 mg total) by mouth daily. (Patient taking differently: Take 2 tablets (100 mg total) by mouth Noon.), Disp: 90 tablet, Rfl: 1    Glucose Blood (ACCU-CHEK MILLA PLUS) In Vitro Strip, 2 (two) times a day., Disp: , Rfl:     furosemide 40 MG Oral Tab, Take 1 tablet (40 mg total) by mouth 3 (three) times a week. Pt taking three times a day, Disp: , Rfl:         AMANDA ALCARAZ, NP  2/6/24       50  minutes spent on patient education, chart review and documentation:  Patient instructed regarding clinic procedures, hours, purpose of clinic visits, sodium restricted diet, low sodium foods, fluid restrictions, daily weights, medication regimen s/s of heart failure exacerbation and when to call APN/clinic. Provided patient with  counseling, coordination of care and education given. Patient receptive.

## 2024-02-07 ENCOUNTER — NURSE ONLY (OUTPATIENT)
Dept: CARDIOLOGY CLINIC | Facility: HOSPITAL | Age: 74
End: 2024-02-07
Attending: NURSE PRACTITIONER
Payer: MEDICARE

## 2024-02-07 DIAGNOSIS — I50.32 CHRONIC HEART FAILURE WITH PRESERVED EJECTION FRACTION (HFPEF) (HCC): Primary | ICD-10-CM

## 2024-02-07 PROCEDURE — 93264 REM MNTR WRLS P-ART PRS SNR: CPT | Performed by: NURSE PRACTITIONER

## 2024-02-08 NOTE — PROGRESS NOTES
CARDIOMEMS REMOTE MONTHLY BILLING TEMPLATE        Remote Hemodynamic Device Report Summary - Remote Visit    Patient Name: Rosita Lemus MRN: [id number] : 1950         Provider:   Amanda MELISSA  Cardiologist: Dr. Naresh Saldana                                    Diagnosis: HFpEF     Reporting period: 23 to 24.     Cardiomems Goal: PAD 12-18 mmHg.     Implant date 23; PA 36/6, m 20, PCWP 10, DPG -4       Current readings:   PAD 13 mmHg on 24  PAD readings ranging 12-15 mmHg.      Remote monitoring documentation:   Reviewed above readings weekly. Euvolemic on lower dose diuretics.     Plan:  Continue PA pressure monitoring weekly, continued patient assessment and communication per abbot and Epic messaging, phone or clinic visit to maintain optimal range of PA pressures    AMANDA ALCARAZ NP, 24, 8:22 PM  [facility]

## 2024-03-07 NOTE — PROGRESS NOTES
CARDIOMEMS REMOTE MONTHLY BILLING TEMPLATE        Remote Hemodynamic Device Report Summary - Remote Visit    Patient Name: Rosita Lemus MRN: [id number] : 1950         Provider:   Amanda MELISSA  Cardiologist: Dr. Naresh Saldana                                    Diagnosis: HFpEF     Reporting period: 27 - 24.     Cardiomems Goal: PAD 12-18 mmHg.     Implant date 23; PA 36/6, m 20, PCWP 10, DPG -4       Current readings:   PAD 11 mmHg on 24  PAD readings ranging 11-26 mmHg in last 30 days.      Remote monitoring documentation:   Reviewed above readings weekly. Euvolemic on lower dose diuretics.     Plan:  Continue PA pressure monitoring weekly, continued patient assessment and communication per abbot and Epic messaging, phone or clinic visit to maintain optimal range of PA pressures    AMANDA ALCARAZ NP, 24  [facility]

## 2024-03-11 ENCOUNTER — NURSE ONLY (OUTPATIENT)
Dept: CARDIOLOGY CLINIC | Facility: HOSPITAL | Age: 74
End: 2024-03-11
Attending: NURSE PRACTITIONER
Payer: MEDICARE

## 2024-03-11 DIAGNOSIS — I50.32 CHRONIC HEART FAILURE WITH PRESERVED EJECTION FRACTION (HFPEF) (HCC): Primary | ICD-10-CM

## 2024-03-11 PROCEDURE — 93264 REM MNTR WRLS P-ART PRS SNR: CPT | Performed by: NURSE PRACTITIONER

## 2024-03-13 ENCOUNTER — TELEPHONE (OUTPATIENT)
Dept: CARDIOLOGY CLINIC | Facility: HOSPITAL | Age: 74
End: 2024-03-13

## 2024-03-13 NOTE — TELEPHONE ENCOUNTER
Called and s/w Rosita, she states she is doing fine . States she has been drinking and getting her fluids in. Per Valentina instructions- she is to decrease her furosemide to 20 mg 3 x week. Rosita repeated instructions correctly. Will continue to monitor cardiomem.   PAD this week has been 11.

## 2024-03-13 NOTE — TELEPHONE ENCOUNTER
----- Message from Valentina Nuñez NP sent at 3/13/2024  8:04 AM CDT -----  Regarding: Decrease furosemide with low cardiomems readings  Can you check with Rosita to see how she is doing, make sure she isn't having any illness, fever, vomiting or diarrhea. Her cardiomems readings are on the low end. If she is feeling ok I recommend she decrease furosemide to 20 mg three times weekly.   Thx.

## 2024-03-13 NOTE — TELEPHONE ENCOUNTER
Rosita called in with some clarification questions on fluid intake and readings, questions answered.

## 2024-03-18 ENCOUNTER — TELEPHONE (OUTPATIENT)
Dept: CARDIOLOGY CLINIC | Facility: HOSPITAL | Age: 74
End: 2024-03-18

## 2024-03-18 NOTE — TELEPHONE ENCOUNTER
Rosita called to update us that she has been taking Lasix 20 mg M/W/F as directed. She reports some swelling in lower legs near ankles/feet and fingers are feeling tight. Today is her lasix day.  Reviewed today's cardioMEMS readin, goal: 18 and instructed her call tomorrow with update.  Agreed with plan.

## 2024-03-25 NOTE — TELEPHONE ENCOUNTER
PAD 11 today, 10 yesterday  Goal range: 12-18  Rosita called, noting wt today 235 lb, legs less tight but still swollen taking Lasix 40 mg M/W/F. Does not feel 20 mg three times a week was doing anything for her.    Advised will review with Valentina and call her back with any change in dosing.

## 2024-04-02 ENCOUNTER — TELEPHONE (OUTPATIENT)
Dept: CARDIOLOGY CLINIC | Facility: HOSPITAL | Age: 74
End: 2024-04-02

## 2024-04-02 ENCOUNTER — LAB ENCOUNTER (OUTPATIENT)
Dept: LAB | Facility: HOSPITAL | Age: 74
End: 2024-04-02
Attending: NURSE PRACTITIONER
Payer: MEDICARE

## 2024-04-02 DIAGNOSIS — I50.32 CHRONIC DIASTOLIC HF (HEART FAILURE) (HCC): ICD-10-CM

## 2024-04-02 LAB
ANION GAP SERPL CALC-SCNC: 9 MMOL/L (ref 0–18)
BUN BLD-MCNC: 19 MG/DL (ref 9–23)
BUN/CREAT SERPL: 13.3 (ref 10–20)
CALCIUM BLD-MCNC: 9.8 MG/DL (ref 8.7–10.4)
CHLORIDE SERPL-SCNC: 108 MMOL/L (ref 98–112)
CO2 SERPL-SCNC: 29 MMOL/L (ref 21–32)
CREAT BLD-MCNC: 1.43 MG/DL
EGFRCR SERPLBLD CKD-EPI 2021: 39 ML/MIN/1.73M2 (ref 60–?)
FASTING STATUS PATIENT QL REPORTED: YES
GLUCOSE BLD-MCNC: 119 MG/DL (ref 70–99)
OSMOLALITY SERPL CALC.SUM OF ELEC: 305 MOSM/KG (ref 275–295)
POTASSIUM SERPL-SCNC: 3.6 MMOL/L (ref 3.5–5.1)
SODIUM SERPL-SCNC: 146 MMOL/L (ref 136–145)

## 2024-04-02 PROCEDURE — 36415 COLL VENOUS BLD VENIPUNCTURE: CPT

## 2024-04-02 PROCEDURE — 80048 BASIC METABOLIC PNL TOTAL CA: CPT

## 2024-04-02 RX ORDER — FUROSEMIDE 20 MG/1
20 TABLET ORAL EVERY OTHER DAY
Qty: 30 TABLET | Refills: 5 | Status: SHIPPED | OUTPATIENT
Start: 2024-04-02

## 2024-04-02 NOTE — TELEPHONE ENCOUNTER
Rosita's creatinine is elevated to 1.43,with worsening kidney function on higher dose furosemide. Her cardiomems is on the low end at 11 mmhg. I recommend she decrease furosemide to 20 mg every other day. I sent her pharmacy a new rx for furosemide 20mg tabs. Suggest knee high compression compression stocking during the day to limit leg swelling.

## 2024-04-03 NOTE — TELEPHONE ENCOUNTER
S/w Rosita and explain the lab results and Valentina's instructions. Discussed the increase kidney numbers and using compression. She states she does wear compression already. She is concerned as her legs became more swollen with decreased diuretic. She declines to follow instructions at this time. She took her lasix today at 40 mg dose. And we will discuss with Valentina tomorrow. Rosita agrees to plan.

## 2024-04-04 ENCOUNTER — TELEPHONE (OUTPATIENT)
Dept: CARDIOLOGY CLINIC | Facility: HOSPITAL | Age: 74
End: 2024-04-04

## 2024-04-04 NOTE — TELEPHONE ENCOUNTER
PAD 10-11 mmhg, just below or at normal low range. pt insists on taking higher dose lasix 40 mg 3x weekly for chronic persistent stephanie LE edema that worsens on lower dose diuretic, Renal function has slightly decreased to 19/1.43.  Will monitor cardiomems readings, if low reinforce she should try lower dose lasix.

## 2024-04-08 ENCOUNTER — TELEPHONE (OUTPATIENT)
Dept: CARDIOLOGY CLINIC | Facility: HOSPITAL | Age: 74
End: 2024-04-08

## 2024-04-08 DIAGNOSIS — I50.32 CHRONIC HEART FAILURE WITH PRESERVED EJECTION FRACTION (HFPEF) (HCC): Primary | ICD-10-CM

## 2024-04-08 NOTE — TELEPHONE ENCOUNTER
CardioMEMS GOAL 11-17  PAD 10 today 8-10 over the weekend  Reviewed readings and goal range and recent lab/BMP results. Discussed fluid intake with reminder to drink 48-64 ounces daily. She noted she may not be drinking as much as she should.  Reviewed need to be careful with diuretics due to effect on kidneys. She queried lisinopril use. Reviewed chart, lisinopril increased to 40 mg a day and APN recommended she stay on that and decrease the lasix, preferably to 20 mg 3 days a week. Rosita hesitant to decrease dosing as she states that once she does her leg swelling increases. Instructed per Valentina, to at least decrease lasix 40 mg 2 days a week and 20 mg one day a week and to check BMP this week to check kidney functions and electrolytes.  Agreed to check her lab this week. Discussed possible need to see nephrology.

## 2024-04-09 NOTE — TELEPHONE ENCOUNTER
Rosita called in to review her prior conversations. Reviewed importance of protecting heart and kidneys and use of medications. She agrees to Valentina's instructions as listed prior note. Confirms lasix 40 mg 2 times a week and 20 mg 1 time a week. She will get blood test done in a week.

## 2024-04-10 ENCOUNTER — NURSE ONLY (OUTPATIENT)
Dept: CARDIOLOGY CLINIC | Facility: HOSPITAL | Age: 74
End: 2024-04-10
Attending: NURSE PRACTITIONER
Payer: MEDICARE

## 2024-04-10 DIAGNOSIS — I50.32 CHRONIC HEART FAILURE WITH PRESERVED EJECTION FRACTION (HFPEF) (HCC): Primary | ICD-10-CM

## 2024-04-10 PROCEDURE — 93264 REM MNTR WRLS P-ART PRS SNR: CPT | Performed by: NURSE PRACTITIONER

## 2024-04-10 NOTE — PROGRESS NOTES
CARDIOMEMS REMOTE MONTHLY BILLING TEMPLATE        Remote Hemodynamic Device Report Summary - Remote Visit    Patient Name: Rosita Lemus MRN: [id number] : 1950         Provider:   Amanda MELISSA  Cardiologist: Dr. Naresh Saldana                                    Diagnosis: HFpEF     Reporting period: 27 - 04/10/24.     Cardiomems Goal: PAD 12-18 mmHg.     Implant date 23; PA 36/6, m 20, PCWP 10, DPG -4       Current readings:   PAD 9 mmHg on 04/10/24  PAD readings ranging 8-12 mmHg in last 30 days.      Remote monitoring documentation:   Reviewed above readings weekly. Euvolemic on lower dose diuretics, adjusting diuretics prn.     Plan:  Continue PA pressure monitoring weekly, continued patient assessment and communication per abbot and Epic messaging, phone or clinic visit to maintain optimal range of PA pressures    AMANDA ALCARAZ NP, 04/10/24  [facility]

## 2024-04-15 ENCOUNTER — TELEPHONE (OUTPATIENT)
Dept: CARDIOLOGY CLINIC | Facility: HOSPITAL | Age: 74
End: 2024-04-15

## 2024-04-16 ENCOUNTER — OFFICE VISIT (OUTPATIENT)
Dept: ENDOCRINOLOGY CLINIC | Facility: CLINIC | Age: 74
End: 2024-04-16
Payer: MEDICARE

## 2024-04-16 VITALS
HEART RATE: 58 BPM | WEIGHT: 234 LBS | DIASTOLIC BLOOD PRESSURE: 78 MMHG | SYSTOLIC BLOOD PRESSURE: 148 MMHG | BODY MASS INDEX: 35 KG/M2

## 2024-04-16 DIAGNOSIS — E11.22 TYPE 2 DIABETES MELLITUS WITH CHRONIC KIDNEY DISEASE, WITHOUT LONG-TERM CURRENT USE OF INSULIN, UNSPECIFIED CKD STAGE (HCC): Primary | ICD-10-CM

## 2024-04-16 LAB
CARTRIDGE LOT#: ABNORMAL NUMERIC
HEMOGLOBIN A1C: 7.1 % (ref 4.3–5.6)

## 2024-04-16 PROCEDURE — 1159F MED LIST DOCD IN RCRD: CPT

## 2024-04-16 PROCEDURE — 99214 OFFICE O/P EST MOD 30 MIN: CPT

## 2024-04-16 PROCEDURE — 3078F DIAST BP <80 MM HG: CPT

## 2024-04-16 PROCEDURE — 3077F SYST BP >= 140 MM HG: CPT

## 2024-04-16 PROCEDURE — 83036 HEMOGLOBIN GLYCOSYLATED A1C: CPT

## 2024-04-16 PROCEDURE — 3051F HG A1C>EQUAL 7.0%<8.0%: CPT

## 2024-04-16 PROCEDURE — 1160F RVW MEDS BY RX/DR IN RCRD: CPT

## 2024-04-16 NOTE — PROGRESS NOTES
Name: Rosita Lemus  Date: 1/16/24    Referring Physician: No ref. provider found    HISTORY OF PRESENT ILLNESS   Rosita Lemus is a 73 year old female who presents for follow up for diabetes mellitus     Her 's health has declined since last visit so has been feeling more stressed in caring for him.     Diabetes History:  Diagnosed- around 10 years ago   Patient has not had hospitalizations for blood sugar issues    Prior glycohemoglobin were: 9.5% 4/2023; 7.2% 7/2023; 7.0% 12/2023; 7.1%  POC today       Dietary compliance: Fair- has been trying to decrease intake of soda in the past several months      Recall:  Breakfast- Chick erika sandwich and a few fries and small fruit cup   Lunch- sometimes skips, typically leftovers from dinner meal   Dinner- Protein, with potato, sometimes vegetable   Snack- Sometimes oreo cookies or 1 scoop ice cream   Beverages- water, occ orange crush or tea - unsweetened, or occ. Small can ginger ale or sprite     Exercise: Yes- more walking and stairs more      Polyuria/polydipsia: No  Blurred vision: No- blurry vision in R eye- macular hole     Episodes of hypoglycemia: Denies symptoms, has not been checking sugars recently     Blood Glucose:    - Not checking     Current DM Regimen:  Jardiance- 10mg PO daily   Metformin- 500mg PO daily   Januvia 100mg daily    Modifying factors:  Medication adherence: yes   Recent steroids, illness or infections: no       REVIEW OF SYSTEMS  Eyes: Diabetic retinopathy present: Yes- mild NPDR            Most recent visit to eye doctor in last 12 months: Yes- Dr. Richmond 6/20/2023, Last optho appt 8/1/23    CV: Cardiovascular disease present: Yes- s/p stent x 2 12/2021, CHF- follows with HF clinic.          Hypertension present: Yes         Hyperlipidemia present: Yes         Peripheral Vascular Disease present: No    : Nephropathy present: Yes- CKD-follows with Dr. Whitehead.     Neuro: Neuropathy present: No, denies symptoms     Skin:  Infection or ulceration: No    Osteoporosis: No    Thyroid disease: No      Medications:     Current Outpatient Medications:     furosemide 20 MG Oral Tab, Take 1 tablet (20 mg total) by mouth every other day. And as directed (Patient taking differently: Take 1 tablet (20 mg total) by mouth As Directed. 4/9 - Lasix 40 mg on Monday and Friday, , 20 mg on weds.), Disp: 30 tablet, Rfl: 5    famotidine 10 MG Oral Tab, Take 1 tablet (10 mg total) by mouth daily as needed for Heartburn., Disp: , Rfl:     lisinopril 40 MG Oral Tab, Take 1 tablet (40 mg total) by mouth daily., Disp: 30 tablet, Rfl: 5    Elastic Bandages & Supports (MEDICAL COMPRESSION STOCKINGS) Does not apply Misc, 1 each daily. Knee high 15-20 mmhg wear during the day off at night, Disp: 1 each, Rfl: 0    empagliflozin 10 MG Oral Tab, Take 1 tablet (10 mg total) by mouth daily., Disp: , Rfl:     amLODIPine 10 MG Oral Tab, Take 1 tablet (10 mg total) by mouth at bedtime., Disp: , Rfl:     ROSUVASTATIN 40 MG Oral Tab, TAKE ONE TABLET BY MOUTH DAILY, Disp: 90 tablet, Rfl: 0    ACCU-CHEK SOFTCLIX LANCETS Does not apply Misc, TEST BLOOD GLUCOSE ONCE DAILY, Disp: 100 each, Rfl: 3    CARVEDILOL 25 MG Oral Tab, TAKE ONE TABLET BY MOUTH TWICE A DAY WITH MEALS, Disp: 180 tablet, Rfl: 1    metFORMIN 500 MG Oral Tab, Take 1 tablet (500 mg total) by mouth 2 (two) times daily with meals., Disp: 180 tablet, Rfl: 1    aspirin 81 MG Oral Tab EC, Take 1 tablet (81 mg total) by mouth daily., Disp: , Rfl:     SITagliptin Phosphate (JANUVIA) 50 MG Oral Tab, Take 1 tablet (50 mg total) by mouth daily. (Patient taking differently: Take 2 tablets (100 mg total) by mouth Noon.), Disp: 90 tablet, Rfl: 1    Glucose Blood (ACCU-CHEK MILLA PLUS) In Vitro Strip, 2 (two) times a day., Disp: , Rfl:      Allergies:   Allergies   Allergen Reactions    Flu Virus Vaccine OTHER (SEE COMMENTS)     Other reaction(s): Propensity to adverse reactions to drug    Sulfa Antibiotics OTHER (SEE  COMMENTS)    Other OTHER (SEE COMMENTS)     wool       Social History:   Social History     Socioeconomic History    Marital status: Single   Tobacco Use    Smoking status: Never    Smokeless tobacco: Never   Vaping Use    Vaping status: Never Used   Substance and Sexual Activity    Alcohol use: Not Currently    Drug use: Never       Medical History:   Past Medical History:    Diabetes (HCC)    Diabetes mellitus (HCC)    Diverticulitis    Essential hypertension    High blood pressure    High cholesterol    Hyperlipidemia    Sleep apnea       Surgical history:   Past Surgical History:   Procedure Laterality Date    Angiogram N/A 01/07/2022    Cataract extraction w/  intraocular lens implant Right 2017    Dr. Griffin    Colonoscopy N/A 6/5/2020    Procedure: COLONOSCOPY;  Surgeon: Ye Ling MD;  Location: [facility] ENDOSCOPY    Colonoscopy screening - referral N/A 12/27/2022    Procedure: COLONOSCOPY-SCREENING;  Surgeon: Ye Ling MD;  Location: [facility] ENDOSCOPY    Hysterectomy      Vit for macular hole Right 2016    surgery for macular hole in 2016- Dr. Ross?    Yag capsulotomy - od - right eye Right 2017    Dr. Griffin         PHYSICAL EXAM  Vitals:    04/16/24 0906   BP: 148/78   Pulse: 58   Weight: 234 lb (106.1 kg)           General Appearance:  alert, well developed, in no acute distress  Eyes:  normal conjunctivae, sclera, and normal pupils  Neck: Trachea midline: Normal  Back: no kyphosis or back tenderness  Lymph Nodes:  No abnormal nodes noted  Musculoskeletal:  normal muscle strength and tone  Skin:  normal moisture and skin texture  Hair & Nails:  normal scalp hair     Hematologic:  no excessive bruising  Neuro:  sensory grossly intact and motor grossly intact.  Psychiatric:  oriented to time, self, and place  Nutritional:  no abnormal weight gain or loss       ASSESSMENT/PLAN:    Diabetes mellitus type 2 uncontrolled  -HgA1c- 7.1%- stable  -Reviewed ABC's of diabetes   - Reviewed  pathogenesis of diabetes.   - Reviewed importance of good glycemic control to prevent microvascular and macrovascular complications including nephropathy, neuropathy, retinopathy, and cardiovascular disease.  - Reviewed importance of SBGM- check glucose 2 times daily   - Reviewed target glucose goals for patient  fasting and <180 post prandially   - Reviewed importance of following diabetic diet- recommended 135 grams of CHO per day or 45 grams per meal.   - Provided patient education materials    - Continue Jardiance 10mg PO daily. Reviewed side effects and risks vs benefits of medication. Reviewed importance of staying well hydrated on medication     -Continue Metformin 500mg PO daily - eGFR 39, 4/2/24 continue lower dose with decreased kidney function.     -Decrease Januvia to 50mg PO daily given decline in kidney function. eGFR- 39 4/2/24.     -BP elevated but improved on repeat.   -lipids at goal 12/2023- on rosuvastatin 40mg daily   -+ nephropathy- CKD- following with nephrology- Saw Dr. Whitehead 7/2023- on lisinopril - needs follow up appointment- will schedule.   -UTD with optho- reviewed importance of optho follow up- last visit 8/1/23  -normal foot exam 7/2023- reviewed daily foot exam and foot care  -Reviewed low CHO diet today  - Check sugars 1-2 times daily- alternating fasting and post prandial- Call office if sugars <80 or persistently >180.     RTC in 3 months   4/16/24  MATHIEU Agustin    A total of  30 minutes was spent on obtaining history, reviewing pertinent imaging/labs and specialists notes, evaluating patient, providing multiple treatment options, reinforcing diet/exercise and compliance, and completing documentation.

## 2024-04-18 ENCOUNTER — TELEPHONE (OUTPATIENT)
Dept: CARDIOLOGY CLINIC | Facility: HOSPITAL | Age: 74
End: 2024-04-18

## 2024-04-18 RX ORDER — FUROSEMIDE 20 MG/1
20 TABLET ORAL AS DIRECTED
COMMUNITY
Start: 2024-04-18

## 2024-04-18 NOTE — TELEPHONE ENCOUNTER
Spoke with patient and daughter.  Her CardioMEMS readings have been running on the low side, PAD 7-11 mmhg, just at low end or below her goal of 11-17.  She is taking Lasix 40 mg on Mondays and Fridays and 20 mg on Wednesdays that is managing her lower extremity edema fairly well.  When she goes on the lower dose of Lasix her leg swelling worsens.  She is using knee-high compression stockings.  I discussed that she is on the drier side with her current dose of Lasix and I would recommend her decreasing to 20 mg 4 times a week. SHe will consider, will follow cardiomems readings. She just got a new pillow, she is not having any problems and readings are coming thru clear She will check a BMP on Monday, April 22 and I will forward results to Dr. Whitehead and she will follow-up with him in the next few weeks.

## 2024-04-22 ENCOUNTER — LAB ENCOUNTER (OUTPATIENT)
Dept: LAB | Facility: HOSPITAL | Age: 74
End: 2024-04-22
Attending: NURSE PRACTITIONER
Payer: MEDICARE

## 2024-04-22 DIAGNOSIS — I50.32 CHRONIC HEART FAILURE WITH PRESERVED EJECTION FRACTION (HFPEF) (HCC): ICD-10-CM

## 2024-04-22 LAB
ANION GAP SERPL CALC-SCNC: 5 MMOL/L (ref 0–18)
BUN BLD-MCNC: 23 MG/DL (ref 9–23)
BUN/CREAT SERPL: 18.1 (ref 10–20)
CALCIUM BLD-MCNC: 9.4 MG/DL (ref 8.7–10.4)
CHLORIDE SERPL-SCNC: 112 MMOL/L (ref 98–112)
CO2 SERPL-SCNC: 26 MMOL/L (ref 21–32)
CREAT BLD-MCNC: 1.27 MG/DL
EGFRCR SERPLBLD CKD-EPI 2021: 45 ML/MIN/1.73M2 (ref 60–?)
FASTING STATUS PATIENT QL REPORTED: YES
GLUCOSE BLD-MCNC: 122 MG/DL (ref 70–99)
OSMOLALITY SERPL CALC.SUM OF ELEC: 301 MOSM/KG (ref 275–295)
POTASSIUM SERPL-SCNC: 4.1 MMOL/L (ref 3.5–5.1)
SODIUM SERPL-SCNC: 143 MMOL/L (ref 136–145)

## 2024-04-22 PROCEDURE — 36415 COLL VENOUS BLD VENIPUNCTURE: CPT

## 2024-04-22 PROCEDURE — 80048 BASIC METABOLIC PNL TOTAL CA: CPT

## 2024-04-24 ENCOUNTER — TELEPHONE (OUTPATIENT)
Dept: CARDIOLOGY CLINIC | Facility: HOSPITAL | Age: 74
End: 2024-04-24

## 2024-04-24 NOTE — TELEPHONE ENCOUNTER
CardioMEMS goal 11-17  PAD 13 today    Discussed blood test results from 4/22 (reviewed APN's note sent via [employer]). Her daughter helps her with [employer] messages.    She complains of night sweats and does not believe it is due to the heat in her home and wondered if it was related to Jardiance. Information reviewed, not listed as a side effect. She noted she checks her blood sugar prior to going to bed and it has been fine. Instructed her to discuss this with her primary care provider.

## 2024-05-13 ENCOUNTER — NURSE ONLY (OUTPATIENT)
Dept: CARDIOLOGY CLINIC | Facility: HOSPITAL | Age: 74
End: 2024-05-13
Attending: NURSE PRACTITIONER
Payer: MEDICARE

## 2024-05-13 DIAGNOSIS — I50.32 CHRONIC HEART FAILURE WITH PRESERVED EJECTION FRACTION (HFPEF) (HCC): Primary | ICD-10-CM

## 2024-05-13 PROCEDURE — 93264 REM MNTR WRLS P-ART PRS SNR: CPT | Performed by: NURSE PRACTITIONER

## 2024-05-13 NOTE — PROGRESS NOTES
CARDIOMEMS REMOTE MONTHLY BILLING TEMPLATE        Remote Hemodynamic Device Report Summary - Remote Visit    Patient Name: Rosita Lemus MRN: [id number] : 1950         Provider:   Valentina MELISSA  Cardiologist: Dr. Naresh Saldana                                    Diagnosis: HFpEF     Reporting period: 2024 - 2024    Cardiomems Goal: PAD goal range 12-18 mmHg > 11-17.     Implant date 23; PA 36/6, m 20, PCWP 10, DPG -4       Current readings:   PAD 11 mmHg on 24  PAD readings ranging 7-13 mmHg in last 30 days.      Remote monitoring documentation:   Reviewed above readings weekly. Euvolemic on lower dose diuretics, adjusting diuretics prn.     Plan:  Continue PA pressure monitoring weekly, continued patient assessment and communication per abbot and Epic messaging, phone or clinic visit to maintain optimal range of PA pressures    Chayo Oviedo, MATHIEU, 24

## 2024-05-16 LAB — AMB EXT HGBA1C: 6.9 %

## 2024-06-03 ENCOUNTER — LAB ENCOUNTER (OUTPATIENT)
Dept: LAB | Facility: HOSPITAL | Age: 74
End: 2024-06-03
Attending: NURSE PRACTITIONER
Payer: MEDICARE

## 2024-06-03 DIAGNOSIS — D50.0 IRON DEFICIENCY ANEMIA DUE TO CHRONIC BLOOD LOSS: ICD-10-CM

## 2024-06-03 DIAGNOSIS — I50.32 CHRONIC HEART FAILURE WITH PRESERVED EJECTION FRACTION (HFPEF) (HCC): Primary | ICD-10-CM

## 2024-06-03 DIAGNOSIS — I50.32 CHRONIC HEART FAILURE WITH PRESERVED EJECTION FRACTION (HFPEF) (HCC): ICD-10-CM

## 2024-06-03 LAB
ANION GAP SERPL CALC-SCNC: 4 MMOL/L (ref 0–18)
BNP SERPL-MCNC: 92 PG/ML
BUN BLD-MCNC: 25 MG/DL (ref 9–23)
BUN/CREAT SERPL: 20.5 (ref 10–20)
CALCIUM BLD-MCNC: 9.6 MG/DL (ref 8.7–10.4)
CHLORIDE SERPL-SCNC: 109 MMOL/L (ref 98–112)
CO2 SERPL-SCNC: 29 MMOL/L (ref 21–32)
CREAT BLD-MCNC: 1.22 MG/DL
EGFRCR SERPLBLD CKD-EPI 2021: 47 ML/MIN/1.73M2 (ref 60–?)
FASTING STATUS PATIENT QL REPORTED: YES
GLUCOSE BLD-MCNC: 131 MG/DL (ref 70–99)
OSMOLALITY SERPL CALC.SUM OF ELEC: 300 MOSM/KG (ref 275–295)
POTASSIUM SERPL-SCNC: 4.2 MMOL/L (ref 3.5–5.1)
SODIUM SERPL-SCNC: 142 MMOL/L (ref 136–145)

## 2024-06-03 PROCEDURE — 83880 ASSAY OF NATRIURETIC PEPTIDE: CPT

## 2024-06-03 PROCEDURE — 83540 ASSAY OF IRON: CPT

## 2024-06-03 PROCEDURE — 84466 ASSAY OF TRANSFERRIN: CPT

## 2024-06-03 PROCEDURE — 80048 BASIC METABOLIC PNL TOTAL CA: CPT

## 2024-06-03 PROCEDURE — 36415 COLL VENOUS BLD VENIPUNCTURE: CPT

## 2024-06-03 PROCEDURE — 82728 ASSAY OF FERRITIN: CPT

## 2024-06-03 NOTE — PROGRESS NOTES
[facility]    Rosita Lemus Patient Status:  No patient class for patient encounter    1950 MRN [id number]   Location [facility] MD Dr. Shana Guzmán is a 73 year old female who presents to clinic for assessment and management for hospitalization for acute on chronic heart failure.    Admitted for elective cardiomems procedure 23.  RA 5, RV 39/7, PA 36/6 mean 20, PCW 10, CO 5 / CI 2.2, SVR 1486 /   Hypovolemic. Reduce diuretics to avoid LINDA. Started on plavix 75 mg daily.        Problem List:  Chronic HFpEF, NYHA class III, Stage C, EF 55-60%  Cardiomems 23, Goal range 12-18 mmHg   CKD   CAD with PCI, FAUZIA of LAD x2 2022  DM type 2, on insulin  HTN  HLD    Subjective:  Here with daughter  Feeling good, helping care for her x-   Tolerating up and down stairs multiple times a day and doing lite housework.   Chronic stephanie LE edema at baseline, wearing compression stockings,   Swelling at baseline    -140's  at home.   No logan or orthopnea with 2 pillows , occasional wheezing at nighttime , not wearing CPAP, does not tolerate mask  Denies cp or dizziness    Review of Systems:  Constitutional: negative for chills or fever  Respiratory:  negative for cough, hemoptysis and wheezing  Cardiovascular: negative for chest pain, exertional chest pressure/discomfort, near-syncope, orthopnea and palpitations  Gastrointestinal: negative for abdominal pain, diarrhea, melena, nausea and vomiting  Hematologic/lymphatic: negative  Musculoskeletal: negative for muscle weakness and myalgias    Objective:  Lab Results   Component Value Date/Time    WBC 7.0 2023 07:19 AM    HGB 12.7 2023 07:19 AM    HCT 40.1 2023 07:19 AM    .0 2023 07:19 AM    CREATSERUM 1.22 (H) 2024 07:37 AM    BUN 25 (H) 2024 07:37 AM     2024 07:37 AM    K 4.2 2024 07:37 AM      2024 07:37 AM    CO2 29.0 2024 07:37 AM     (H) 2024 07:37 AM    CA 9.6 2024 07:37 AM    ALB 3.6 2022 08:49 AM    ALB 4.00 2022 08:49 AM    ALKPHO 88 2022 08:49 AM    BILT 0.3 2022 08:49 AM    TP 7.2 2022 08:49 AM    TP 7.3 2022 08:49 AM    AST 13 (L) 2022 08:49 AM    ALT 18 2022 08:49 AM    INR 1.02 2023 07:19 AM    PTP 14.0 2023 07:19 AM    T4F 1.0 2021 07:42 AM    TSH 2.170 2021 07:42 AM    ESRML 20 2022 08:49 AM    CRP <0.29 2022 08:49 AM    PHOS 3.3 2022 08:49 AM    B12 692 10/14/2021 06:32 AM    PGLU 104 (H) 2023 01:15 PM       Labs drawn by  lab: bmp, bnp, results reviewed with patient.  Personally interpreted results.     /66 (BP Location: Right arm, Patient Position: Sitting, Cuff Size: large)   Pulse 55   Wt 239 lb 3.2 oz (108.5 kg)   SpO2 96%   BMI 35.31 kg/m²       Date  Clinic wt Home wt MCI wt DC wt IV med  cardiomems   23   235      23 238        1/3/24   236      24 240 238     12 mmHg   24 239     13 mmHg      General appearance: alert, appears stated age and cooperative  Neck: no JVD at 90 degrees  Lungs: clear to auscultation bilaterally  Heart: S1, S2 normal, no murmur, click, rub or gallop, regular rate and rhythm- HR 50 bpm   Abdomen: soft, non-tender; bowel sounds normal; no masses,  no abdominal distension  Extremities: extremities normal, atraumatic, no cyanosis, +1-2 stephanie ankle /pedal edema  Pulses: 2+ and symmetric  Neurologic: Grossly normal  Measurements:   24:RLE Calf: 16.758\" Ankle: 12.75\"  LLE Calf: 17.75\" Ankle: 12   24: RLE Calf: 16.5\" Ankle: 13\"    LLE Calf: 17.5\" Ankle: 11\"     Diagnostics:  EC23 sinus taryn 47 bpm.  Echocardiogram: 2023, EF 55-60%, no wma, + diastolic dysfunction, mod stephanie atrial dilation, mild MR, mild/mod TR, RA 8 mmHg. PA 35 mmhg  Heart cath: 2022 FAUZIA X2 to LAD , PCI   RHC:  12/19/23  RA 5  RV 39/7  PA 36/6 mean 20  PCW 10     CO 5 / CI 2.2  SVR 1486 /     Recommendations:  Reduce diuretics to avoid LINDA     Devices:   [  ]ICD  [ ]BIV-ICD  [  ]PPM  [  ]Life Vest  [x  ]CardioMEMS      Assessment:  Chronic HFp EF, NYHA class III, stage C  -EF 55-60%  -s/p cardiomems insertion 12/19/23, on the dry side , PA 36/6 mean 20, PCW 10, Rex-5.   -diuretics: furosemide 40 mg three times weekly  -GDMT: coreg, lisinopril, jardiance  -PAD 13 mmHg, ranging 10-13 mmHg in last few weeks, rare low of 7 mmHg.   -goal 12-18   -weight stable, stephanie LE edema mild at baseline  - BP normal today   -Renal function normal, bun/cr:<- 25/1.22 <-16/1.03<- 15/1.06  -BNP 92 today  <-100   -euvolemic today with mild chronic stephanie LE edema  -continue same medications including furosemide 40 mg 3 times weekly  -Will continue monitoring CardioMEMS and renal function, if CardioMEMS readings become too low may have to consider switching to spironolactone and lower dose furosemide    CAD with PCI of LAD 1/2022  -on asa, statin    CKD stage IIIa  -renal function normal/stable  - sees Dr. Whitehead     HTN  -BP normal   -continue amlodipine, coreg, jardiance,  lisinopril, furosemide    LILY, not using CPAP  -Does not tolerate mask  -Consider seeing pulmonary specialist, recommend patient discuss with Dr. Tolbert    Hx of HOMER  -denies any sign of bleeding   -last hgb nml 12/7 on 12/2023  - iron counts pending    Plan:     Patient Instructions   Continue all your medications as prescribed     Call if you are having shortness of breath, cough, wheezing, chest pain, dizziness, lightheadedness, heart racing, palpitations, swelling, rapid weight gain, fatigue, weakness, fever or chills.     Call 911 with severe shortness of breath, chest pain or chest pressure not improved after 15 minutes of rest or if feeling faint,  passing out or having a fall     Weigh yourself daily in the morning before breakfast, call if gaining 3 lbs or more  overnight or more than 5 lbs in one week.    Follow 2000 mg sodium restricted, heart healthy diet.     Keep daily fluids at 48-64 ounces per day (1.5-2 liters of liquid or 6-8 , 8 oz glasses of liquid)    Follow up with Dr. Whitehead in the next month, call to make an appointment at [phone]     Follow up with the specialty care clinic in 3 months     Always carry a copy of your current medication list with you    Talk with Dr. Tolbert about seeing a pulmonary sleep specialist to help with sleep apnea treatment         Limit sodium to  2000 mg daily. Common high sodium foods include frozen dinners, soups (not homemade), some cereal, vegetable juice, canned vegetables, lunch meats, processed meats like hotdogs, sausage, bruce, pepperoni, soy sauce, pre-packaged rice or potatoes. Please remember to read nutrition labels for sodium content.     www.healthyeating.nhlbi.nih.gov      Exercise daily as tolerated, with goal of doing moderate aerobic exercise like walking for about 30 minutes 5 days per week. Start by walking at a slow to moderate pace for 3-5 minutes 2-3 times a day. Pace your activity to prevent shortness of breath or fatigue. Stop exercise if you develop chest pain, lightheadedness, or significant shortness of breath                            Current Outpatient Medications:     furosemide 20 MG Oral Tab, Take 1 tablet (20 mg total) by mouth As Directed. 40 mg on Monday and Friday, , 20 mg on Wednesdays, Disp: , Rfl:     famotidine 10 MG Oral Tab, Take 1 tablet (10 mg total) by mouth daily as needed for Heartburn., Disp: , Rfl:     lisinopril 40 MG Oral Tab, Take 1 tablet (40 mg total) by mouth daily., Disp: 30 tablet, Rfl: 5    empagliflozin 10 MG Oral Tab, Take 1 tablet (10 mg total) by mouth daily., Disp: , Rfl:     amLODIPine 10 MG Oral Tab, Take 1 tablet (10 mg total) by mouth at bedtime., Disp: , Rfl:     ROSUVASTATIN 40 MG Oral Tab, TAKE ONE TABLET BY MOUTH DAILY, Disp: 90 tablet, Rfl: 0     CARVEDILOL 25 MG Oral Tab, TAKE ONE TABLET BY MOUTH TWICE A DAY WITH MEALS, Disp: 180 tablet, Rfl: 1    metFORMIN 500 MG Oral Tab, Take 1 tablet (500 mg total) by mouth 2 (two) times daily with meals., Disp: 180 tablet, Rfl: 1    aspirin 81 MG Oral Tab EC, Take 1 tablet (81 mg total) by mouth daily., Disp: , Rfl:     SITagliptin Phosphate (JANUVIA) 50 MG Oral Tab, Take 1 tablet (50 mg total) by mouth daily. (Patient taking differently: Take 2 tablets (100 mg total) by mouth Noon.), Disp: 90 tablet, Rfl: 1    Elastic Bandages & Supports (MEDICAL COMPRESSION STOCKINGS) Does not apply Misc, 1 each daily. Knee high 15-20 mmhg wear during the day off at night, Disp: 1 each, Rfl: 0    ACCU-CHEK SOFTCLIX LANCETS Does not apply Misc, TEST BLOOD GLUCOSE ONCE DAILY, Disp: 100 each, Rfl: 3    Glucose Blood (ACCU-CHEK MILLA PLUS) In Vitro Strip, 2 (two) times a day., Disp: , Rfl:         AMANDA ALCARAZ NP  6/4/24        45 minutes spent on patient education, chart review and documentation:  Patient instructed regarding clinic procedures, hours, purpose of clinic visits, sodium restricted diet, low sodium foods, fluid restrictions, daily weights, medication regimen s/s of heart failure exacerbation and when to call APN/clinic. Provided patient with  counseling, coordination of care and education given. Patient receptive.

## 2024-06-04 ENCOUNTER — OFFICE VISIT (OUTPATIENT)
Dept: CARDIOLOGY CLINIC | Facility: HOSPITAL | Age: 74
End: 2024-06-04
Attending: NURSE PRACTITIONER
Payer: MEDICARE

## 2024-06-04 VITALS
BODY MASS INDEX: 35 KG/M2 | WEIGHT: 239.19 LBS | OXYGEN SATURATION: 96 % | DIASTOLIC BLOOD PRESSURE: 66 MMHG | HEART RATE: 55 BPM | SYSTOLIC BLOOD PRESSURE: 137 MMHG

## 2024-06-04 DIAGNOSIS — I25.10 CORONARY ARTERY DISEASE INVOLVING NATIVE CORONARY ARTERY OF NATIVE HEART WITHOUT ANGINA PECTORIS: ICD-10-CM

## 2024-06-04 DIAGNOSIS — I50.32 CHRONIC HEART FAILURE WITH PRESERVED EJECTION FRACTION (HFPEF) (HCC): Primary | ICD-10-CM

## 2024-06-04 DIAGNOSIS — G47.33 OSA (OBSTRUCTIVE SLEEP APNEA): ICD-10-CM

## 2024-06-04 DIAGNOSIS — I10 PRIMARY HYPERTENSION: ICD-10-CM

## 2024-06-04 DIAGNOSIS — N18.30 CKD STAGE 3 DUE TO TYPE 2 DIABETES MELLITUS (HCC): ICD-10-CM

## 2024-06-04 DIAGNOSIS — D50.0 IRON DEFICIENCY ANEMIA DUE TO CHRONIC BLOOD LOSS: ICD-10-CM

## 2024-06-04 DIAGNOSIS — E11.22 CKD STAGE 3 DUE TO TYPE 2 DIABETES MELLITUS (HCC): ICD-10-CM

## 2024-06-04 LAB
DEPRECATED HBV CORE AB SER IA-ACNC: 28.2 NG/ML
IRON SATN MFR SERPL: 18 %
IRON SERPL-MCNC: 65 UG/DL
TIBC SERPL-MCNC: 358 UG/DL (ref 250–425)
TRANSFERRIN SERPL-MCNC: 240 MG/DL (ref 250–380)

## 2024-06-04 PROCEDURE — 99215 OFFICE O/P EST HI 40 MIN: CPT | Performed by: NURSE PRACTITIONER

## 2024-06-04 NOTE — PROGRESS NOTES
Subjective: Denies lightheadedness, or dizziness, chest pain or palpitations or shortness of breath. Bilateral lower extremity swelling continues, wearing compression stockings. Reports left hand cramping/tingling at night.    Weight:  Today 239.2 lb   Home none Last visit 240.9 lb  Labs completed: at lab BMP/BNP 6/3  Device:  CardioMEMS Interrogation AT HOME reviewed reading PAD 13 GOAL 11-17  IV placed:  NO  Measurements:  RLE Calf: 16.758\" Ankle: 12.75\"  LLE Calf: 17.75\" Ankle: 12\"  Medications given NONE.     Patient and medication assessed. Discussed with APN. Treatments completed leg measurements, cardioMEMS review.  Medications given NONE. Extensive education of disease management.  Reviewed AVS instructions. Patient verbalized understanding.

## 2024-06-04 NOTE — PATIENT INSTRUCTIONS
Continue all your medications as prescribed     Call if you are having shortness of breath, cough, wheezing, chest pain, dizziness, lightheadedness, heart racing, palpitations, swelling, rapid weight gain, fatigue, weakness, fever or chills.     Call 911 with severe shortness of breath, chest pain or chest pressure not improved after 15 minutes of rest or if feeling faint,  passing out or having a fall     Weigh yourself daily in the morning before breakfast, call if gaining 3 lbs or more overnight or more than 5 lbs in one week.    Follow 2000 mg sodium restricted, heart healthy diet.     Keep daily fluids at 48-64 ounces per day (1.5-2 liters of liquid or 6-8 , 8 oz glasses of liquid)    Follow up with Dr. Whitehead in the next month, call to make an appointment at [phone]     Follow up with the specialty care clinic in 3 months     Always carry a copy of your current medication list with you    Talk with Dr. Tolbert about seeing a pulmonary sleep specialist to help with sleep apnea treatment         Limit sodium to  2000 mg daily. Common high sodium foods include frozen dinners, soups (not homemade), some cereal, vegetable juice, canned vegetables, lunch meats, processed meats like hotdogs, sausage, bruce, pepperoni, soy sauce, pre-packaged rice or potatoes. Please remember to read nutrition labels for sodium content.     www.healthyeating.nhlbi.nih.gov      Exercise daily as tolerated, with goal of doing moderate aerobic exercise like walking for about 30 minutes 5 days per week. Start by walking at a slow to moderate pace for 3-5 minutes 2-3 times a day. Pace your activity to prevent shortness of breath or fatigue. Stop exercise if you develop chest pain, lightheadedness, or significant shortness of breath

## 2024-06-06 ENCOUNTER — TELEPHONE (OUTPATIENT)
Dept: CARDIOLOGY CLINIC | Facility: HOSPITAL | Age: 74
End: 2024-06-06

## 2024-06-06 NOTE — TELEPHONE ENCOUNTER
Appointment conflict with office meeting on 9/10/24. Spoke with Rosita and rescheduled from 8am to 9am. She will check with her daughter.

## 2024-06-06 NOTE — TELEPHONE ENCOUNTER
To call back and schedule for venofer injection 1 weekly for 3 weeks.     S/w Rosita and relayed lab results , discussed recommendation for iron injections. She will look at her schedule and call us next week. See TE 6/6

## 2024-06-10 NOTE — TELEPHONE ENCOUNTER
Rosita called and asked questions about iron. Questions answered. She will call back to schedule.   Statement Selected

## 2024-06-12 ENCOUNTER — NURSE ONLY (OUTPATIENT)
Dept: CARDIOLOGY CLINIC | Facility: HOSPITAL | Age: 74
End: 2024-06-12
Attending: NURSE PRACTITIONER
Payer: MEDICARE

## 2024-06-12 DIAGNOSIS — I50.32 CHRONIC HEART FAILURE WITH PRESERVED EJECTION FRACTION (HFPEF) (HCC): Primary | ICD-10-CM

## 2024-06-12 PROCEDURE — 93264 REM MNTR WRLS P-ART PRS SNR: CPT | Performed by: NURSE PRACTITIONER

## 2024-06-12 NOTE — PROGRESS NOTES
CARDIOMEMS REMOTE MONTHLY BILLING TEMPLATE        Remote Hemodynamic Device Report Summary - Remote Visit    Patient Name: Rosita Lemus MRN: [id number] : 1950         Provider:   Valentina MELISSA  Cardiologist: Dr. Naresh Saldana                                    Diagnosis: HFpEF     Reporting period: 2024 - 2024    Cardiomems Goal: PAD goal range 12-18 mmHg > 11-17.     Implant date 23; PA 36/6, m 20, PCWP 10, DPG -4       Current readings:   PAD 13 mmHg on 24  PAD readings ranging 7-14 mmHg in last 30 days.      Remote monitoring documentation:   Reviewed above readings weekly. Euvolemic on lower dose diuretics, adjusting diuretics prn.     Plan:  Continue PA pressure monitoring weekly, continued patient assessment and communication per abbot and Epic messaging, phone or clinic visit to maintain optimal range of PA pressures

## 2024-06-20 NOTE — TELEPHONE ENCOUNTER
Rosita called back and spoke with Valentina. Agreed to IV iron, assisted to schedule 1st appointment of 3.

## 2024-06-24 ENCOUNTER — OFFICE VISIT (OUTPATIENT)
Dept: CARDIOLOGY CLINIC | Facility: HOSPITAL | Age: 74
End: 2024-06-24
Attending: NURSE PRACTITIONER

## 2024-06-24 VITALS
BODY MASS INDEX: 35 KG/M2 | RESPIRATION RATE: 16 BRPM | HEART RATE: 55 BPM | OXYGEN SATURATION: 98 % | SYSTOLIC BLOOD PRESSURE: 129 MMHG | DIASTOLIC BLOOD PRESSURE: 59 MMHG | WEIGHT: 238.88 LBS

## 2024-06-24 DIAGNOSIS — I50.32 CHRONIC HEART FAILURE WITH PRESERVED EJECTION FRACTION (HFPEF) (HCC): ICD-10-CM

## 2024-06-24 DIAGNOSIS — D50.0 IRON DEFICIENCY ANEMIA DUE TO CHRONIC BLOOD LOSS: Primary | ICD-10-CM

## 2024-06-24 PROCEDURE — 96374 THER/PROPH/DIAG INJ IV PUSH: CPT

## 2024-06-24 PROCEDURE — 99215 OFFICE O/P EST HI 40 MIN: CPT

## 2024-06-24 NOTE — PROGRESS NOTES
Subjective: Rosita is here today and her only compliant is fatigue.  She is here for venofer treatment.     Weight:  Today - 238.9 #    Home - Last visit 239#  Labs completed : at lab no   Device:  CardioMEMS Interrogation reading done at home, * reviewed reading - PAD is 11, range 11-17.   IV placed:  no   Measurements :  RLE calf- 16.5' , ankle- 12'    LLE calf- 17.5\" ankle-  11\"   Lungs clear   Non pitting edema to BLE, support hose on.   Venofer 200 mg IVP     Patient and medication assessed. Discussed with APN. Treatments completed- physical assessment, leg measurements/assessment, IVP medication.  Cardiomem review.  Medications given- Venofer 200 mg IVP . Extensive education of disease management .  Reviewed AVS instructions. Patient verbalized understanding.

## 2024-06-24 NOTE — PATIENT INSTRUCTIONS
Continue all your medications as prescribed      Call if you are having shortness of breath, cough, wheezing, chest pain, dizziness, lightheadedness, heart racing, palpitations, swelling, rapid weight gain, fatigue, weakness, fever or chills.      Call 911 with severe shortness of breath, chest pain or chest pressure not improved after 15 minutes of rest or if feeling faint,  passing out or having a fall      Weigh yourself daily in the morning before breakfast, call if gaining 3 lbs or more overnight or more than 5 lbs in one week.     Follow 2000 mg sodium restricted, heart healthy diet.      Keep daily fluids at 48-64 ounces per day (1.5-2 liters of liquid or 6-8 , 8 oz glasses of liquid)     Follow up with Dr. Whitehead in the next month, call to make an appointment at [phone]      Follow up with the specialty care clinic in 3 months      Always carry a copy of your current medication list with you

## 2024-06-25 NOTE — PROGRESS NOTES
Here for RN visit for iron deficiency anemia. Hx of chronic HFpEF.     Here for venofer 200 mg IV, first of 3 weekly doses. Tolerated well.   Cardiomems PAD reading 11 mmhg today, at low end of goal. Euvolemic today.     Valentina MELISSA, 6/24/2024

## 2024-06-27 ENCOUNTER — TELEPHONE (OUTPATIENT)
Dept: CARDIOLOGY CLINIC | Facility: HOSPITAL | Age: 74
End: 2024-06-27

## 2024-07-01 ENCOUNTER — OFFICE VISIT (OUTPATIENT)
Dept: CARDIOLOGY CLINIC | Facility: HOSPITAL | Age: 74
End: 2024-07-01
Attending: NURSE PRACTITIONER
Payer: MEDICARE

## 2024-07-01 VITALS — SYSTOLIC BLOOD PRESSURE: 134 MMHG | DIASTOLIC BLOOD PRESSURE: 64 MMHG

## 2024-07-01 DIAGNOSIS — D50.0 IRON DEFICIENCY ANEMIA DUE TO CHRONIC BLOOD LOSS: ICD-10-CM

## 2024-07-01 DIAGNOSIS — I50.32 CHRONIC HEART FAILURE WITH PRESERVED EJECTION FRACTION (HFPEF) (HCC): Primary | ICD-10-CM

## 2024-07-01 PROCEDURE — 99215 OFFICE O/P EST HI 40 MIN: CPT

## 2024-07-01 PROCEDURE — 96374 THER/PROPH/DIAG INJ IV PUSH: CPT

## 2024-07-01 NOTE — PATIENT INSTRUCTIONS
Continue all your medications as prescribed      Call if you are having shortness of breath, cough, wheezing, chest pain, dizziness, lightheadedness, heart racing, palpitations, swelling, rapid weight gain, fatigue, weakness, fever or chills.      Call 911 with severe shortness of breath, chest pain or chest pressure not improved after 15 minutes of rest or if feeling faint,  passing out or having a fall      Weigh yourself daily in the morning before breakfast, call if gaining 3 lbs or more overnight or more than 5 lbs in one week.     Follow 2000 mg sodium restricted, heart healthy diet.      Keep daily fluids at 48-64 ounces per day (1.5-2 liters of liquid or 6-8 , 8 oz glasses of liquid)     Follow up with Dr. Whitehead in the next month, call to make an appointment at [phone]      Follow up with the specialty care clinic in 3 months      Always carry a copy of your current medication list with you

## 2024-07-01 NOTE — PROGRESS NOTES
Rosita is here for nurse visit for IV venofer   Subjective: Denies lightheadedness, or dizziness, chest pain or palpitations, swelling or shortness of breath.    Weight:  Today 241.7 lb   Home NONE Last visit 238.9  Labs completed:  none  Device:  CardioMEMS Interrogation AT HOME, Reviewed PAD 10 GOAL RANGE 11-17. Noted she did not drink much this morning yet.  IV placed:  NO  Measurements:  RLE Calf: 16.75\" Ankle:13\"   LLE Calf: 17.75\" Ankle: 12\"  Medications given Venofer 200 mg IV push.    Patient and medication assessed. Discussed with APN. Treatments completed leg measurements, IV medication administration, repeat blood pressure stable.  Medications given Venofer 200 mg IV push. Extensive education of disease management including drinking minimum of 48 ounces of fluid daily.  Reviewed AVS instructions. Patient verbalized understanding.

## 2024-07-02 NOTE — PROGRESS NOTES
Here for RN visit for venofer infusion. History of HFpEF and Iron deficiency anemia.   2nd of three doses of venofer given today. Tolerated well.   Follow up in 1 week for 3rd dose of venofer.     Valentina MELISSA, 7/2/2024

## 2024-07-03 ENCOUNTER — TELEPHONE (OUTPATIENT)
Dept: CARDIOLOGY CLINIC | Facility: HOSPITAL | Age: 74
End: 2024-07-03

## 2024-07-08 ENCOUNTER — OFFICE VISIT (OUTPATIENT)
Dept: CARDIOLOGY CLINIC | Facility: HOSPITAL | Age: 74
End: 2024-07-08
Attending: NURSE PRACTITIONER
Payer: MEDICARE

## 2024-07-08 VITALS
WEIGHT: 242.38 LBS | DIASTOLIC BLOOD PRESSURE: 60 MMHG | HEART RATE: 57 BPM | RESPIRATION RATE: 16 BRPM | BODY MASS INDEX: 36 KG/M2 | OXYGEN SATURATION: 97 % | SYSTOLIC BLOOD PRESSURE: 134 MMHG

## 2024-07-08 DIAGNOSIS — I50.32 CHRONIC HEART FAILURE WITH PRESERVED EJECTION FRACTION (HFPEF) (HCC): Primary | ICD-10-CM

## 2024-07-08 DIAGNOSIS — D50.0 IRON DEFICIENCY ANEMIA DUE TO CHRONIC BLOOD LOSS: ICD-10-CM

## 2024-07-08 PROCEDURE — 99215 OFFICE O/P EST HI 40 MIN: CPT

## 2024-07-08 PROCEDURE — 96374 THER/PROPH/DIAG INJ IV PUSH: CPT

## 2024-07-08 NOTE — PATIENT INSTRUCTIONS
Continue all your medications as prescribed      Call if you are having shortness of breath, cough, wheezing, chest pain, dizziness, lightheadedness, heart racing, palpitations, swelling, rapid weight gain, fatigue, weakness, fever or chills.      Call 911 with severe shortness of breath, chest pain or chest pressure not improved after 15 minutes of rest or if feeling faint,  passing out or having a fall      Weigh yourself daily in the morning before breakfast, call if gaining 3 lbs or more overnight or more than 5 lbs in one week.     Follow 2000 mg sodium restricted, heart healthy diet.      Keep daily fluids at 48-64 ounces per day (1.5-2 liters of liquid or 6-8 , 8 oz glasses of liquid)     Follow up with Dr. Whitehead in the next month, call to make an appointment at [phone]      Follow up with the specialty care clinic in 3 months      Always carry a copy of your current medication list with you

## 2024-07-08 NOTE — PROGRESS NOTES
Subjective: Rosita is here today and is feeling well. No complaints voiced.     Weight:  Today - 242.4 #   Home  Last visit 241#   Labs completed : at lab  no labs   Device:  CardioMEMS Interrogation  reading done at home  reviewed reading - PAD is 12.   IV placed:  no   Measurements :  RLE calf- 16.75\" ankle- 12.25\"     LLE calf- 16.75\" ankle- 11.5\"    Venofer 200 mg IVP given dose #3 of 3.   Lungs clear   Slight non pitting BLE edema, compression stocking on.     Patient and medication assessed. Discussed with APN. Treatments completed- cardiomem review, leg measurements, IVP medications,  physical assessment .  Medications given- Venofer 200 mg IVP . Extensive education of disease management .  Reviewed AVS instructions. Patient verbalized understanding.

## 2024-07-09 NOTE — PROGRESS NOTES
Here for RN visit for venofer infusion. History of HFpEF and Iron deficiency anemia.   3rd of three doses of venofer given today. Tolerated well.   Cardiomems reading- PA 12 mmHg today, at low end of normal range.  Will follow cardiomems.   Follow up on 9/10/24.     Valentina MELISSA, 7/2/2024

## 2024-07-15 ENCOUNTER — NURSE ONLY (OUTPATIENT)
Dept: CARDIOLOGY CLINIC | Facility: HOSPITAL | Age: 74
End: 2024-07-15
Attending: NURSE PRACTITIONER
Payer: MEDICARE

## 2024-07-15 DIAGNOSIS — I50.32 CHRONIC HEART FAILURE WITH PRESERVED EJECTION FRACTION (HFPEF) (HCC): Primary | ICD-10-CM

## 2024-07-15 PROCEDURE — 93264 REM MNTR WRLS P-ART PRS SNR: CPT | Performed by: NURSE PRACTITIONER

## 2024-07-15 NOTE — PROGRESS NOTES
CARDIOMEMS REMOTE MONTHLY BILLING TEMPLATE        Remote Hemodynamic Device Report Summary - Remote Visit    Patient Name: Rosita Lemus MRN: [id number] : 1950         Provider:   Amanda MELISSA  Cardiologist: Dr. Naresh Saldana                                    Diagnosis: HFpEF     Reporting period: 2024 - 07/15/2024    Cardiomems Goal: PAD goal range 12-18 mmHg > 11-17.     Implant date 23; PA 36/6, m 20, PCWP 10, DPG -4       Current readings:   PAD 13 mmHg on 7/15/24  PAD readings ranging 8/14 mmHg in last 30 days.      Remote monitoring documentation:   Reviewed above readings weekly. Euvolemic on lower dose diuretics, adjusting diuretics prn.   Plan:  Continue PA pressure monitoring weekly, continued patient assessment and communication per abbot and Epic messaging, phone or clinic visit to maintain optimal range of PA pressures  AMANDA ALCARAZ NP, 07/15/24, 4:15 PM

## 2024-07-16 ENCOUNTER — OFFICE VISIT (OUTPATIENT)
Dept: ENDOCRINOLOGY CLINIC | Facility: CLINIC | Age: 74
End: 2024-07-16

## 2024-07-16 ENCOUNTER — OFFICE VISIT (OUTPATIENT)
Dept: NEPHROLOGY | Facility: CLINIC | Age: 74
End: 2024-07-16

## 2024-07-16 VITALS
WEIGHT: 242 LBS | DIASTOLIC BLOOD PRESSURE: 75 MMHG | HEART RATE: 57 BPM | BODY MASS INDEX: 36 KG/M2 | SYSTOLIC BLOOD PRESSURE: 154 MMHG

## 2024-07-16 VITALS
HEART RATE: 68 BPM | BODY MASS INDEX: 37.98 KG/M2 | WEIGHT: 242 LBS | SYSTOLIC BLOOD PRESSURE: 108 MMHG | HEIGHT: 67 IN | DIASTOLIC BLOOD PRESSURE: 48 MMHG

## 2024-07-16 DIAGNOSIS — N18.31 STAGE 3A CHRONIC KIDNEY DISEASE (HCC): Primary | ICD-10-CM

## 2024-07-16 DIAGNOSIS — E11.22 CONTROLLED TYPE 2 DIABETES MELLITUS WITH CHRONIC KIDNEY DISEASE, WITHOUT LONG-TERM CURRENT USE OF INSULIN, UNSPECIFIED CKD STAGE (HCC): Primary | ICD-10-CM

## 2024-07-16 PROBLEM — E66.01 SEVERE OBESITY (BMI 35.0-39.9) WITH COMORBIDITY (HCC): Chronic | Status: ACTIVE | Noted: 2024-07-16

## 2024-07-16 LAB
GLUCOSE BLOOD: 155
TEST STRIP LOT #: NORMAL NUMERIC

## 2024-07-16 PROCEDURE — 99214 OFFICE O/P EST MOD 30 MIN: CPT | Performed by: INTERNAL MEDICINE

## 2024-07-16 PROCEDURE — 82947 ASSAY GLUCOSE BLOOD QUANT: CPT

## 2024-07-16 PROCEDURE — 1159F MED LIST DOCD IN RCRD: CPT

## 2024-07-16 PROCEDURE — 3008F BODY MASS INDEX DOCD: CPT | Performed by: INTERNAL MEDICINE

## 2024-07-16 PROCEDURE — 99214 OFFICE O/P EST MOD 30 MIN: CPT

## 2024-07-16 PROCEDURE — 1159F MED LIST DOCD IN RCRD: CPT | Performed by: INTERNAL MEDICINE

## 2024-07-16 PROCEDURE — 3044F HG A1C LEVEL LT 7.0%: CPT

## 2024-07-16 PROCEDURE — 3077F SYST BP >= 140 MM HG: CPT

## 2024-07-16 PROCEDURE — 3074F SYST BP LT 130 MM HG: CPT | Performed by: INTERNAL MEDICINE

## 2024-07-16 PROCEDURE — 3078F DIAST BP <80 MM HG: CPT

## 2024-07-16 PROCEDURE — 3078F DIAST BP <80 MM HG: CPT | Performed by: INTERNAL MEDICINE

## 2024-07-16 PROCEDURE — 1160F RVW MEDS BY RX/DR IN RCRD: CPT

## 2024-07-16 NOTE — PROGRESS NOTES
07/16/24        Patient: Rosita Lemus   YOB: 1950   Date of Visit: 7/16/2024       Dear  Dr. Tomasz MD,      Thank you for referring Rosita Lemus to my practice.  Please find my assessment and plan below.      As you know she is a 73-year-old female with a history of adult onset diabetes mellitus, hypertension, coronary artery disease, congestive heart failure who I now had the pleasure of seeing for follow-up of chronic kidney disease stage III.  I have not seen the patient since July 2023.  She is states that since I last saw her she had a CardioMEMS placed.  Last echocardiogram I could find was from July 2023 that showed a left ventricular ejection fraction of 55 to 60% with diastolic dysfunction.  She had a right heart cath done in December 2023 which was quite good with a right atrial pressure of 5 and a pulmonary capillary wedge pressure of 10.  She apparently has had ongoing problems with lower extremity edema but at least by history no significant pulmonary edema.  She states diuretics have been increased and then decreased and she is concerned about her kidney function.    At the present time she is taking furosemide 40 mg on Mondays and Fridays and 20 mg on Wednesdays.  She is on 40 mg of lisinopril.  She does wear support hose.    On physical exam her blood pressure is 108/48 with a pulse of 60 and she weighed 242 pounds.  Her neck was supple without JVD.  Lungs were clear.  Heart revealed a regular rate and rhythm with an S4 but no gallops, murmurs or rubs.  Abdomen was soft, flat, nontender without organomegaly, masses or bruits.  Extremities revealed 1-2+ lower extremity edema right greater than left.    I reviewed recent laboratory studies.  Creatinine was up to 1.43.  In April 2024 but has been declining.  Was 1.27 later on in April 2024 and on Fartun 3, 2024 creatinine is 1.22 with an estimated GFR of 47 cc/min.  She had an A1c of 6.9 in May 2024 and a urine microalbumin to  creatinine ratio of 293.  She had a renal ultrasound done in June 2023 that was unremarkable.    I therefore reassured the patient that all she does have chronic kidney disease stage III her blood pressures and blood sugars appear to be under good control.  Renal function fairly stable.  I think there are times when her CardioMEMS readings are low or normal but she still has peripheral edema.  Discussed that in that setting it would be inappropriate to increase diuretics as that would exacerbate her renal insufficiency.  Reinforced low-salt diet, elevation of the legs and to continue to wear support hose.  She does not have significant proteinuria to account for this edema.  May benefit from evaluation for varicose veins and/or lymphedema.  Avoid nonsteroidals.  She will have the heart failure clinic send me copies of her laboratory studies.  Return in 6 months or sooner if clinically indicated.    Thank you again for allowing me to participate in the care of your patient.  If you have any questions please feel free to call.        Sincerely,   Bryan Whitehead MD   [facility], [facility], [place]  [address]  [facility] [address]    Document electronically generated by:  Bryan Whitehead MD

## 2024-07-16 NOTE — PROGRESS NOTES
Name: Rosita Lemus  Date: 7/16/24    Referring Physician: No ref. provider found    HISTORY OF PRESENT ILLNESS   Rosita Lemus is a 73 year old female who presents for follow up for diabetes mellitus     Diabetes History:  Diagnosed- around 10 years ago   Patient has not had hospitalizations for blood sugar issues    Prior glycohemoglobin were: 9.5% 4/2023; 7.2% 7/2023; 7.0% 12/2023; 7.1% 4/2024; 6.9% 5/2024     Dietary compliance: Fair- has been trying to decrease intake of soda in the past several months      Recall:  Breakfast- Chick erika sandwich and a few fries and small fruit cup   Lunch- sometimes skips, typically leftovers from dinner meal   Dinner- Protein, with potato, sometimes vegetable   Snack-chips   Beverages- water, occ orange crush or tea - unsweetened, or occ. Small can ginger ale or sprite     Exercise: Yes- more walking and stairs more      Polyuria/polydipsia: No  Blurred vision: No - blurry vision in R eye- macular hole     Episodes of hypoglycemia: Denies symptoms, has not been checking sugars recently     Blood Glucose:    - Not checking     Current DM Regimen:  Jardiance- 10mg PO daily   Metformin- 500 mg PO daily   Januvia 50 mg daily    Modifying factors:  Medication adherence: yes   Recent steroids, illness or infections: no       REVIEW OF SYSTEMS  Eyes: Diabetic retinopathy present: Yes- mild NPDR            Most recent visit to eye doctor in last 12 months: Yes- Dr. Richmond last visit 7/11/24, Last optho appt 8/1/23    CV: Cardiovascular disease present: Yes- s/p stent x 2 12/2021, CHF- follows with HF clinic.          Hypertension present: Yes         Hyperlipidemia present: Yes         Peripheral Vascular Disease present: No    : Nephropathy present: Yes- CKD-follows with Dr. Whitehead    Neuro: Neuropathy present: No, denies symptoms     Skin: Infection or ulceration: No    Osteoporosis: No    Thyroid disease: No      Medications:     Current Outpatient Medications:      empagliflozin 10 MG Oral Tab, Take 1 tablet (10 mg total) by mouth daily., Disp: , Rfl:     metFORMIN 500 MG Oral Tab, Take 1 tablet (500 mg total) by mouth 2 (two) times daily with meals., Disp: 180 tablet, Rfl: 1    SITagliptin Phosphate (JANUVIA) 50 MG Oral Tab, Take 1 tablet (50 mg total) by mouth daily. (Patient taking differently: Take 2 tablets (100 mg total) by mouth Noon.), Disp: 90 tablet, Rfl: 1    furosemide 20 MG Oral Tab, Take 1 tablet (20 mg total) by mouth As Directed. 40 mg on Monday and Friday, , 20 mg on Wednesdays, Disp: , Rfl:     famotidine 10 MG Oral Tab, Take 1 tablet (10 mg total) by mouth daily as needed for Heartburn., Disp: , Rfl:     lisinopril 40 MG Oral Tab, Take 1 tablet (40 mg total) by mouth daily., Disp: 30 tablet, Rfl: 5    Elastic Bandages & Supports (MEDICAL COMPRESSION STOCKINGS) Does not apply Misc, 1 each daily. Knee high 15-20 mmhg wear during the day off at night, Disp: 1 each, Rfl: 0    amLODIPine 10 MG Oral Tab, Take 1 tablet (10 mg total) by mouth at bedtime., Disp: , Rfl:     ROSUVASTATIN 40 MG Oral Tab, TAKE ONE TABLET BY MOUTH DAILY, Disp: 90 tablet, Rfl: 0    ACCU-CHEK SOFTCLIX LANCETS Does not apply Misc, TEST BLOOD GLUCOSE ONCE DAILY, Disp: 100 each, Rfl: 3    CARVEDILOL 25 MG Oral Tab, TAKE ONE TABLET BY MOUTH TWICE A DAY WITH MEALS, Disp: 180 tablet, Rfl: 1    aspirin 81 MG Oral Tab EC, Take 1 tablet (81 mg total) by mouth daily., Disp: , Rfl:     Glucose Blood (ACCU-CHEK MILLA PLUS) In Vitro Strip, 2 (two) times a day., Disp: , Rfl:      Allergies:   Allergies   Allergen Reactions    Flu Virus Vaccine OTHER (SEE COMMENTS)     Other reaction(s): Propensity to adverse reactions to drug    Sulfa Antibiotics OTHER (SEE COMMENTS)    Other OTHER (SEE COMMENTS)     wool       Social History:   Social History     Socioeconomic History    Marital status: Single   Tobacco Use    Smoking status: Never    Smokeless tobacco: Never   Vaping Use    Vaping status: Never Used    Substance and Sexual Activity    Alcohol use: Not Currently    Drug use: Never       Medical History:   Past Medical History:    Diabetes (HCC)    Diabetes mellitus (HCC)    Diverticulitis    Essential hypertension    High blood pressure    High cholesterol    Hyperlipidemia    Sleep apnea       Surgical history:   Past Surgical History:   Procedure Laterality Date    Angiogram N/A 01/07/2022    Cataract extraction w/  intraocular lens implant Right 2017    Dr. Griffin    Colonoscopy N/A 6/5/2020    Procedure: COLONOSCOPY;  Surgeon: Ye Ling MD;  Location: [facility] ENDOSCOPY    Colonoscopy screening - referral N/A 12/27/2022    Procedure: COLONOSCOPY-SCREENING;  Surgeon: Ye Ling MD;  Location: [facility] ENDOSCOPY    Hysterectomy      Vit for macular hole Right 2016    surgery for macular hole in 2016- Dr. Ross?    Yag capsulotomy - od - right eye Right 2017    Dr. Griffin       PHYSICAL EXAM  Vitals:    07/16/24 0905   BP: 148/75   Pulse: 55   Weight: 242 lb (109.8 kg)       General Appearance:  alert, well developed, in no acute distress  Eyes:  normal conjunctivae, sclera, and normal pupils  Neck: Trachea midline: Normal  Back: no kyphosis or back tenderness  Lymph Nodes:  No abnormal nodes noted  Musculoskeletal:  normal muscle strength and tone  Skin:  normal moisture and skin texture  Hair & Nails:  normal scalp hair     Hematologic:  no excessive bruising  Neuro:  sensory grossly intact and motor grossly intact.  Psychiatric:  oriented to time, self, and place  Nutritional:  no abnormal weight gain or loss  Feet: Bilateral barefoot skin diabetic exam is abnormal with acute swelling and/or acute deformity and dystrophic nails and/or dry skin         ASSESSMENT/PLAN:    Diabetes mellitus type 2 controlled  -HgA1c- 6.9%   -Reviewed ABC's of diabetes   - Reviewed pathogenesis of diabetes.   - Reviewed importance of good glycemic control to prevent microvascular and macrovascular  complications including nephropathy, neuropathy, retinopathy, and cardiovascular disease.  - Reviewed importance of SBGM- check glucose 2 times daily   - Reviewed target glucose goals for patient  fasting and <180 post prandially   - Reviewed importance of following diabetic diet- recommended 135 grams of CHO per day or 45 grams per meal.   - Provided patient education materials    - Continue Jardiance 10mg PO daily. Reviewed side effects and risks vs benefits of medication. Reviewed importance of staying well hydrated on medication     -Continue Metformin 500mg PO daily - eGFR 39, 4/2/24 continue lower dose with decreased kidney function.     -Continue Januvia  50mg PO daily. Decreased at last visit given decline in kidney function. eGFR- 39 4/2/24 but now improved to 47,  6/2024     -BP elevated - not improved on repeat- has not taken BP medications yet today- nephrology follow up this afternoon.    -lipids at goal 5/2024- on rosuvastatin 40mg daily   -+ nephropathy- CKD- following with nephrology- next appt today with Dr. Whitehead   -UTD with optho- reviewed importance of optho follow up- last visit 7/11/24  -foot exam today   -Reviewed low CHO diet today  -Check sugars 1-2 times daily- alternating fasting and post prandial- Call office if sugars <80 or persistently >180.     RTC in 3 months   7/16/24  MATHIEU Agustin    A total of 30 minutes was spent on obtaining history, reviewing pertinent imaging/labs and specialists notes, evaluating patient, providing multiple treatment options, reinforcing diet/exercise and compliance, and completing documentation.

## 2024-08-14 ENCOUNTER — NURSE ONLY (OUTPATIENT)
Dept: CARDIOLOGY CLINIC | Facility: HOSPITAL | Age: 74
End: 2024-08-14
Attending: NURSE PRACTITIONER
Payer: MEDICARE

## 2024-08-14 DIAGNOSIS — I50.32 CHRONIC HEART FAILURE WITH PRESERVED EJECTION FRACTION (HFPEF) (HCC): Primary | ICD-10-CM

## 2024-08-14 PROCEDURE — 93264 REM MNTR WRLS P-ART PRS SNR: CPT | Performed by: NURSE PRACTITIONER

## 2024-08-14 NOTE — PROGRESS NOTES
CARDIOMEMS REMOTE MONTHLY BILLING TEMPLATE        Remote Hemodynamic Device Report Summary - Remote Visit    Patient Name: Rosita Lemus MRN: [id number] : 1950         Provider:   Amanda MELISSA  Cardiologist: Dr. Naresh Saldana                                    Diagnosis: HFpEF     Reporting period: 2024 - 2024    Cardiomems Goal: PAD goal range 12-18 mmHg > 11-17.     Implant date 23; PA 36/6, m 20, PCWP 10, DPG -4       Current readings:   PAD 13 mmHg on 8/10/24  PAD readings ranging 9-13  mmHg in last 30 days.      Remote monitoring documentation:   Reviewed above readings weekly. Euvolemic on lower dose diuretics, adjusting diuretics prn.   Plan:  Continue PA pressure monitoring weekly, continued patient assessment and communication per abbot and Epic messaging, phone or clinic visit to maintain optimal range of PA pressures  AMANDA ALCARAZ NP, 24, 3:43 PM

## 2024-08-21 ENCOUNTER — ANESTHESIA (OUTPATIENT)
Dept: ENDOSCOPY | Facility: HOSPITAL | Age: 74
End: 2024-08-21
Payer: MEDICARE

## 2024-08-21 ENCOUNTER — HOSPITAL ENCOUNTER (OUTPATIENT)
Facility: HOSPITAL | Age: 74
Setting detail: OBSERVATION
LOS: 1 days | Discharge: HOME | End: 2024-08-22
Attending: STUDENT IN AN ORGANIZED HEALTH CARE EDUCATION/TRAINING PROGRAM | Admitting: HOSPITALIST
Payer: MEDICARE

## 2024-08-21 ENCOUNTER — ANESTHESIA EVENT (OUTPATIENT)
Dept: ENDOSCOPY | Facility: HOSPITAL | Age: 74
End: 2024-08-21
Payer: MEDICARE

## 2024-08-21 DIAGNOSIS — K62.5 BRBPR (BRIGHT RED BLOOD PER RECTUM): Primary | ICD-10-CM

## 2024-08-21 LAB
ANION GAP SERPL CALC-SCNC: 4 MMOL/L (ref 0–18)
ANTIBODY SCREEN: POSITIVE
APTT PPP: 21.9 SECONDS (ref 23–36)
BASOPHILS # BLD AUTO: 0.02 X10(3) UL (ref 0–0.2)
BASOPHILS NFR BLD AUTO: 0.4 %
BUN BLD-MCNC: 15 MG/DL (ref 9–23)
BUN/CREAT SERPL: 15.5 (ref 10–20)
CALCIUM BLD-MCNC: 9 MG/DL (ref 8.7–10.4)
CHLORIDE SERPL-SCNC: 114 MMOL/L (ref 98–112)
CO2 SERPL-SCNC: 25 MMOL/L (ref 21–32)
CREAT BLD-MCNC: 0.97 MG/DL
DEPRECATED RDW RBC AUTO: 52.7 FL (ref 35.1–46.3)
DIRECT COOMBS POLY: NEGATIVE
EGFRCR SERPLBLD CKD-EPI 2021: 62 ML/MIN/1.73M2 (ref 60–?)
EOSINOPHIL # BLD AUTO: 0.1 X10(3) UL (ref 0–0.7)
EOSINOPHIL NFR BLD AUTO: 1.8 %
ERYTHROCYTE [DISTWIDTH] IN BLOOD BY AUTOMATED COUNT: 15.5 % (ref 11–15)
EST. AVERAGE GLUCOSE BLD GHB EST-MCNC: 146 MG/DL (ref 68–126)
GLUCOSE BLD-MCNC: 177 MG/DL (ref 70–99)
GLUCOSE BLDC GLUCOMTR-MCNC: 126 MG/DL (ref 70–99)
GLUCOSE BLDC GLUCOMTR-MCNC: 138 MG/DL (ref 70–99)
GLUCOSE BLDC GLUCOMTR-MCNC: 96 MG/DL (ref 70–99)
HBA1C MFR BLD: 6.7 % (ref ?–5.7)
HCT VFR BLD AUTO: 33.8 %
HCT VFR BLD AUTO: 36.3 %
HGB BLD-MCNC: 10.7 G/DL
HGB BLD-MCNC: 11.4 G/DL
IMM GRANULOCYTES # BLD AUTO: 0.02 X10(3) UL (ref 0–1)
IMM GRANULOCYTES NFR BLD: 0.4 %
INR BLD: 1.12 (ref 0.8–1.2)
LYMPHOCYTES # BLD AUTO: 1.28 X10(3) UL (ref 1–4)
LYMPHOCYTES NFR BLD AUTO: 23.4 %
MCH RBC QN AUTO: 29.2 PG (ref 26–34)
MCHC RBC AUTO-ENTMCNC: 31.7 G/DL (ref 31–37)
MCV RBC AUTO: 92.3 FL
MONOCYTES # BLD AUTO: 0.46 X10(3) UL (ref 0.1–1)
MONOCYTES NFR BLD AUTO: 8.4 %
NEUTROPHILS # BLD AUTO: 3.58 X10 (3) UL (ref 1.5–7.7)
NEUTROPHILS # BLD AUTO: 3.58 X10(3) UL (ref 1.5–7.7)
NEUTROPHILS NFR BLD AUTO: 65.6 %
OSMOLALITY SERPL CALC.SUM OF ELEC: 301 MOSM/KG (ref 275–295)
PLATELET # BLD AUTO: 183 10(3)UL (ref 150–450)
POTASSIUM SERPL-SCNC: 4.2 MMOL/L (ref 3.5–5.1)
PROTHROMBIN TIME: 15.1 SECONDS (ref 11.6–14.8)
RBC # BLD AUTO: 3.66 X10(6)UL
RH BLOOD TYPE: POSITIVE
SODIUM SERPL-SCNC: 143 MMOL/L (ref 136–145)
WBC # BLD AUTO: 5.5 X10(3) UL (ref 4–11)

## 2024-08-21 PROCEDURE — 99223 1ST HOSP IP/OBS HIGH 75: CPT | Performed by: INTERNAL MEDICINE

## 2024-08-21 PROCEDURE — 0DJD8ZZ INSPECTION OF LOWER INTESTINAL TRACT, VIA NATURAL OR ARTIFICIAL OPENING ENDOSCOPIC: ICD-10-PCS | Performed by: INTERNAL MEDICINE

## 2024-08-21 PROCEDURE — 45378 DIAGNOSTIC COLONOSCOPY: CPT | Performed by: INTERNAL MEDICINE

## 2024-08-21 RX ORDER — ONDANSETRON 2 MG/ML
4 INJECTION INTRAMUSCULAR; INTRAVENOUS EVERY 6 HOURS PRN
Status: DISCONTINUED | OUTPATIENT
Start: 2024-08-21 | End: 2024-08-22

## 2024-08-21 RX ORDER — SODIUM CHLORIDE, SODIUM LACTATE, POTASSIUM CHLORIDE, CALCIUM CHLORIDE 600; 310; 30; 20 MG/100ML; MG/100ML; MG/100ML; MG/100ML
INJECTION, SOLUTION INTRAVENOUS CONTINUOUS PRN
Status: DISCONTINUED | OUTPATIENT
Start: 2024-08-21 | End: 2024-08-21 | Stop reason: SURG

## 2024-08-21 RX ORDER — METOCLOPRAMIDE HYDROCHLORIDE 5 MG/ML
10 INJECTION INTRAMUSCULAR; INTRAVENOUS EVERY 8 HOURS PRN
Status: DISCONTINUED | OUTPATIENT
Start: 2024-08-21 | End: 2024-08-22

## 2024-08-21 RX ORDER — EPHEDRINE SULFATE 50 MG/ML
INJECTION INTRAVENOUS AS NEEDED
Status: DISCONTINUED | OUTPATIENT
Start: 2024-08-21 | End: 2024-08-21 | Stop reason: SURG

## 2024-08-21 RX ORDER — NICOTINE POLACRILEX 4 MG
15 LOZENGE BUCCAL
Status: DISCONTINUED | OUTPATIENT
Start: 2024-08-21 | End: 2024-08-22

## 2024-08-21 RX ORDER — SODIUM CHLORIDE, SODIUM LACTATE, POTASSIUM CHLORIDE, CALCIUM CHLORIDE 600; 310; 30; 20 MG/100ML; MG/100ML; MG/100ML; MG/100ML
INJECTION, SOLUTION INTRAVENOUS CONTINUOUS
Status: DISCONTINUED | OUTPATIENT
Start: 2024-08-21 | End: 2024-08-22

## 2024-08-21 RX ORDER — ROSUVASTATIN CALCIUM 20 MG/1
40 TABLET, COATED ORAL DAILY
Status: DISCONTINUED | OUTPATIENT
Start: 2024-08-21 | End: 2024-08-22

## 2024-08-21 RX ORDER — SODIUM CHLORIDE 9 MG/ML
INJECTION, SOLUTION INTRAVENOUS CONTINUOUS
Status: DISCONTINUED | OUTPATIENT
Start: 2024-08-21 | End: 2024-08-22

## 2024-08-21 RX ORDER — LIDOCAINE HYDROCHLORIDE 10 MG/ML
INJECTION, SOLUTION EPIDURAL; INFILTRATION; INTRACAUDAL; PERINEURAL AS NEEDED
Status: DISCONTINUED | OUTPATIENT
Start: 2024-08-21 | End: 2024-08-21 | Stop reason: SURG

## 2024-08-21 RX ORDER — NALOXONE HYDROCHLORIDE 0.4 MG/ML
0.08 INJECTION, SOLUTION INTRAMUSCULAR; INTRAVENOUS; SUBCUTANEOUS ONCE AS NEEDED
Status: DISCONTINUED | OUTPATIENT
Start: 2024-08-21 | End: 2024-08-21 | Stop reason: HOSPADM

## 2024-08-21 RX ORDER — ACETAMINOPHEN 500 MG
500 TABLET ORAL EVERY 4 HOURS PRN
Status: DISCONTINUED | OUTPATIENT
Start: 2024-08-21 | End: 2024-08-22

## 2024-08-21 RX ORDER — NICOTINE POLACRILEX 4 MG
30 LOZENGE BUCCAL
Status: DISCONTINUED | OUTPATIENT
Start: 2024-08-21 | End: 2024-08-22

## 2024-08-21 RX ORDER — DEXTROSE MONOHYDRATE 25 G/50ML
50 INJECTION, SOLUTION INTRAVENOUS
Status: DISCONTINUED | OUTPATIENT
Start: 2024-08-21 | End: 2024-08-22

## 2024-08-21 RX ADMIN — LIDOCAINE HYDROCHLORIDE 25 MG: 10 INJECTION, SOLUTION EPIDURAL; INFILTRATION; INTRACAUDAL; PERINEURAL at 14:40:00

## 2024-08-21 RX ADMIN — EPHEDRINE SULFATE 10 MG: 50 INJECTION INTRAVENOUS at 15:14:00

## 2024-08-21 RX ADMIN — SODIUM CHLORIDE, SODIUM LACTATE, POTASSIUM CHLORIDE, CALCIUM CHLORIDE: 600; 310; 30; 20 INJECTION, SOLUTION INTRAVENOUS at 14:35:00

## 2024-08-21 NOTE — OPERATIVE REPORT
COLONOSCOPY REPORT    Rosita Lemus     1950 Age 73 year old   PCP Fernanda Tolbert MD Endoscopist Vita Adams MD     Date of procedure: 24    Procedure: Colonoscopy w/MAC    Pre-operative diagnosis: hematochezia    Post-operative diagnosis: severe L sided diverticulosis with blood to the splenic flexure, minimal R sided diverticulosis, hemorrhoids, ascending AVM, cecal polyp not removed    Medications: MAC sedation    Withdrawal time: 18 minutes    Procedure:  Informed consent was obtained from the patient after the risks of the procedure were discussed, including but not limited to bleeding, perforation, aspiration, infection, or possibility of a missed lesion. After discussions of the risks/benefits and alternatives to this procedure, as well as the planned sedation, the patient was placed in the left lateral decubitus position and begun on continuous blood pressure pulse oximetry and EKG monitoring and this was maintained throughout the procedure. Once an adequate level of sedation was obtained a digital rectal exam was completed. Then the lubricated tip of the [id number] diagnostic video colonoscope was inserted and advanced without difficulty to the cecum using the CO2 insufflation technique. The cecum was identified by localizing the trifold, the appendix and the ileocecal valve. Withdrawal was begun with thorough washing and careful examination of the colonic walls and folds. A routine second examination of the cecum/ascending colon was performed. Photodocumentation was obtained. The bowel prep was good. Views of the colon were good with washing. I then carefully withdrew the instrument from the patient who tolerated the procedure well.     Complications: none.    Findings:   1. 1 polyp(s) noted 4 mm polyp in the cecum not removed given indication for exam    2. Diverticulosis: severe L sided diverticulosis with fresh blood to the splenic flexure. Cleaned blood and no active bleeding  noted. .    3. Terminal ileum: the visualized mucosa appeared normal.    4. A retroflexed view of the rectum revealed hemorrhoids.    5. The colonic mucosa throughout the colon showed normal vascular pattern. 5 mm Avm in the ascending colon not bleeding    6. LORENZO: normal rectal tone, no masses palpated.     Impression:   Likely L sided diverticular bleed    Recommend:  Elective colonoscopy to removed polyp can be discussed outpatient  Monitor for lower GIB, if repeat bleeding consider NM bleeding scan or if fast bleeding with hypotension CT angio  Ok to do CLD today  H/H Q12  Transfuse if symptomatic or if Hgb <8    >>>If tissue was obtained and you have not received your pathology results either by phone or letter within 2 weeks, please call our office at [phone].    Specimens: none

## 2024-08-21 NOTE — ED PROVIDER NOTES
Patient Seen in: [facility] Emergency Department      History     Chief Complaint   Patient presents with    Rectal Bleeding     Stated Complaint: eval g    Subjective:   HPI    73-year-old female history of CAD status post PCI 12/2022, HFpEF, CKD, hypertension hyperlipidemia, previous severe diverticular hemorrhage presenting with bright red blood per rectum.  Onset symptoms morning.  4 episodes of bright red blood mixed in with stool.  No abdominal pain.  No hemoptysis or hematemesis.  On aspirin -no longer on Plavix.    Objective:   Past Medical History:    Diabetes (HCC)    Diabetes mellitus (HCC)    Diverticulitis    Essential hypertension    High blood pressure    High cholesterol    Hyperlipidemia    Sleep apnea              Past Surgical History:   Procedure Laterality Date    Angiogram N/A 01/07/2022    Cataract extraction w/  intraocular lens implant Right 2017    Dr. Griffin    Colonoscopy N/A 6/5/2020    Procedure: COLONOSCOPY;  Surgeon: Ye Ling MD;  Location: [facility] ENDOSCOPY    Colonoscopy screening - referral N/A 12/27/2022    Procedure: COLONOSCOPY-SCREENING;  Surgeon: Ye Ling MD;  Location: [facility] ENDOSCOPY    Hysterectomy      Vit for macular hole Right 2016    surgery for macular hole in 2016- Dr. Ross?    Yag capsulotomy - od - right eye Right 2017    Dr. Griffin                Social History     Socioeconomic History    Marital status: Single   Tobacco Use    Smoking status: Never    Smokeless tobacco: Never   Vaping Use    Vaping status: Never Used   Substance and Sexual Activity    Alcohol use: Not Currently    Drug use: Never     Social Determinants of Health     Transportation Needs: No Transportation Needs (9/15/2020)    [facility], [facility]    PRAPARE - Transportation     Lack of Transportation (Medical): No     Lack of Transportation (Non-Medical): No   Physical Activity: Unknown (7/17/2020)    Received from  [facility], [facility]    Exercise Vital Sign     Days of Exercise per Week: 0 days              Review of Systems    Positive for stated Chief Complaint: Rectal Bleeding    Other systems are as noted in HPI.  Constitutional and vital signs reviewed.      All other systems reviewed and negative except as noted above.    Physical Exam     ED Triage Vitals [08/21/24 0733]   /64   Pulse 80   Resp 20   Temp 98.2 °F (36.8 °C)   Temp src Oral   SpO2 96 %   O2 Device None (Room air)       Current Vitals:   Vital Signs  BP: 123/71  Pulse: 67  Resp: 18  Temp: 98.4 °F (36.9 °C)  Temp src: Oral  MAP (mmHg): 85    Oxygen Therapy  SpO2: 100 %  O2 Device: None (Room air)  Pulse Oximetry Type: Intermittent            Physical Exam      Constitutional: awake, alert, no sig distress  HENT: mmm, no lesions,  Neck: normal range of motion, no tenderness, supple.  Eyes: PERRL, EOMI, conjunctiva normal, no discharge. Sclera anicteric.  Cardiovascular: rr no murmur  Respiratory: Normal breath sounds, no respiratory distress, no wheezing, no chest tenderness.  GI: Bowel sounds normal, Soft, no tenderness, no masses, no pulsatile masses.  -There was blood present on LORENZO  : No CVA tenderness.  Skin: Warm, dry, no erythema, no rash.  Musculoskeletal: Intact distal pulses, no edema, no tenderness, no cyanosis, no clubbing. Good range of motion in all major joints. No tenderness to palpation or major deformities noted. Back- No tenderness.  Neurologic: Alert & oriented x 3, normal motor function, normal sensory function, no focal deficits noted.  Psych: Calm, cooperative, nl affect    ED Course     Labs Reviewed   BASIC METABOLIC PANEL (8) - Abnormal; Notable for the following components:       Result Value    Glucose 177 (*)     Chloride 114 (*)     Calculated Osmolality 301 (*)     All other components within normal limits   CBC WITH DIFFERENTIAL WITH PLATELET - Abnormal; Notable for the following components:     RBC 3.66 (*)     HGB 10.7 (*)     HCT 33.8 (*)     RDW-SD 52.7 (*)     RDW 15.5 (*)     All other components within normal limits   PTT, ACTIVATED - Abnormal; Notable for the following components:    PTT 21.9 (*)     All other components within normal limits   PROTHROMBIN TIME (PT) - Abnormal; Notable for the following components:    PT 15.1 (*)     All other components within normal limits   HEMOGLOBIN A1C   TYPE AND SCREEN    Narrative:     The following orders were created for panel order Type and screen.  Procedure                               Abnormality         Status                     ---------                               -----------         ------                     ABORH (Blood Type)[[id number]]                               Final result               Antibody Screen[[id number]]                                  Final result                 Please view results for these tests on the individual orders.   ABORH (BLOOD TYPE)   ANTIBODY SCREEN                      MDM      73F hx as above, p/w BRBPR. On arrival vss, reassuring. Not actively bleeding  High clinical suspicion for diverticular hemorrhage, considered bleeding polyp, or internal hemorrhoid. Doubt brisk UGIB. No hx cirrhosis or varices.   Admission disposition: 8/21/2024  9:48 AM         Patient is hemodynamically stable without evidence of active bleeding in the emergency department.  Hemoglobin down about 2 g from prior yesterday.  Again suspicion is for diverticular hemorrhage.  Not actively bleeding not hypotensive in the ER so I do not think would benefit from CTA at this time.  Will admit discussed with hospitalist and gastroenterology                               Medical Decision Making      Disposition and Plan     Clinical Impression:  1. BRBPR (bright red blood per rectum)         Disposition:  Admit  8/21/2024  9:48 am    Follow-up:  No follow-up provider specified.  We recommend that you schedule follow up care with a primary care  provider within the next three months to obtain basic health screening including reassessment of your blood pressure.      Medications Prescribed:  Current Discharge Medication List                            Hospital Problems       Present on Admission  Date Reviewed: 7/16/2024            ICD-10-CM Noted POA    * (Principal) BRBPR (bright red blood per rectum) K62.5 8/21/2024 Unknown

## 2024-08-21 NOTE — H&P
[facility]       CC:   Chief Complaint   Patient presents with    Rectal Bleeding        PCP: Fernanda Tolbert MD    Date of Admission: 8/21/2024  8:20 AM    ASSESSMENT / PLAN:     Ms. Lemus is a 72 yo F with PMH of HFpEF with cardiomems, CAD, DM2, HTN, HL, hx diverticulitis who presented with BRBPR.     BPBPR  Hx diverticulitis   - started this AM, 5 bloody BMs  - Hg 10.7 on admit, down from 13 from routine labs yesterday  - GI consult  - NPO for now  - trend Hg Q6 hr  - gentle IVF given hx of CHF    HFpEF  HTN  - follows with [facility]- will consult  - has cardiomems and follows at HF clinic  - hold lasix for now, restart when able    CAD  HL  - hold ASA  - continue crestor  - denies cardiac symptoms    DM2  - home regimen: metformin 500 mg daily, jardiance 10 mg daily, Januvia 50 mg daily-hold  - accuchecks QID, hypoglycemic protocol, SSI    FN:  - IVF: NS 75cc/hr  - Diet: NPO sips of clears    DVT Prophy: SCD  Lines: PIV    Dispo: pending clinical course    Outpatient records or previous hospital records reviewed.     Further recommendations pending patient's clinical course.  [facility] [place] to continue to follow patient while in house    Patient and/or patient's family given opportunity to ask questions and note understanding and agreeing with therapeutic plan as outlined    Yahaira Patino MD  [facility]  Answering Service number: [phone]    HPI       History of Present Illness:     Ms. Lemus is a 72 yo F with PMH of HFpEF with cardiomems, CAD, DM2, HTN, HL, hx diverticulitis who presented with BRBPR. Patient states symptoms started this morning. Has had 5-6 bloody BMs. Denies dizziness, no CP, SOB. Denies abdominal pain. Has had similar issues in the past, due to diverticulosis, only 1 other hospitalization. Does take ASA, no other blood thinners. Last colonoscopy 2-3 years ago.     In the ED, vitals stable. Labs with Hg 10.7, had routine labs yesterday with Hg 13. GI consulted.      PMH  Past Medical History:    Diabetes (HCC)    Diabetes mellitus (HCC)    Diverticulitis    Essential hypertension    High blood pressure    High cholesterol    Hyperlipidemia    Sleep apnea        PSH  Past Surgical History:   Procedure Laterality Date    Angiogram N/A 01/07/2022    Cataract extraction w/  intraocular lens implant Right 2017    Dr. Griffin    Colonoscopy N/A 6/5/2020    Procedure: COLONOSCOPY;  Surgeon: Ye Ling MD;  Location: [facility] ENDOSCOPY    Colonoscopy screening - referral N/A 12/27/2022    Procedure: COLONOSCOPY-SCREENING;  Surgeon: Ye Ling MD;  Location: [facility] ENDOSCOPY    Hysterectomy      Vit for macular hole Right 2016    surgery for macular hole in 2016- Dr. Ross?    Yag capsulotomy - od - right eye Right 2017    Dr. Griffin        ALL:  Allergies   Allergen Reactions    Flu Virus Vaccine OTHER (SEE COMMENTS)     Other reaction(s): Propensity to adverse reactions to drug    Sulfa Antibiotics OTHER (SEE COMMENTS)    Other OTHER (SEE COMMENTS)     wool        Home Medications:  No outpatient medications have been marked as taking for the 8/21/24 encounter (Hospital Encounter).         Soc Hx  Social History     Tobacco Use    Smoking status: Never    Smokeless tobacco: Never   Substance Use Topics    Alcohol use: Not Currently        Fam Hx  Family History   Problem Relation Age of Onset    Cancer Father 62        COLON CANCER    Hypertension Mother     Other (Other) Mother         CIRCULATION PROBLEM    Other (CHF) Other     Macular degeneration Neg     Glaucoma Neg        Review of Systems  Comprehensive ROS reviewed and negative except for what's stated above.     OBJECTIVE:  /68   Pulse 64   Temp 98.2 °F (36.8 °C) (Oral)   Resp 20   Ht 5' 9\" (1.753 m)   Wt 246 lb (111.6 kg)   SpO2 99%   BMI 36.33 kg/m²     GEN: female in NAD, awake and alert  HEENT: EOMI  Pulm: CTAB, no crackles or wheezes  CV: RRR, no murmurs  ABD: Soft, non-tender,  mildly distended, +BS  SKIN: warm, dry  EXT: 1+ pitting edema R slightly > L    Diagnostic Data:    CBC/Chem    Recent Labs   Lab 08/21/24  0858   RBC 3.66*   HGB 10.7*   HCT 33.8*   MCV 92.3   MCH 29.2   MCHC 31.7   RDW 15.5*   NEPRELIM 3.58   WBC 5.5   .0         Recent Labs   Lab 08/21/24  0858   *   BUN 15   CREATSERUM 0.97   EGFRCR 62   CA 9.0      K 4.2   *   CO2 25.0     Lab Results   Component Value Date    WBC 5.5 08/21/2024    HGB 10.7 08/21/2024    HCT 33.8 08/21/2024    .0 08/21/2024    CREATSERUM 0.97 08/21/2024    BUN 15 08/21/2024     08/21/2024    K 4.2 08/21/2024     08/21/2024    CO2 25.0 08/21/2024     08/21/2024    CA 9.0 08/21/2024    PTT 21.9 08/21/2024    INR 1.12 08/21/2024       No results for input(s): \"TROP\" in the last 168 hours.    Additional Diagnostics: ECG:      Radiology: No results found.

## 2024-08-21 NOTE — CONSULTS
Is this a shared or split note between Advanced Practice Provider and Physician? Yes      [facility]   Gastroenterology Consultation Note    Rosita Lemus  Patient Status:    Emergency  Date of Admission:         8/21/2024, Hospital day #0  Attending:   Wilbert Oswald*  PCP:     Fernanda Tolbert MD    Reason for Consultation:  Hematochezia    History of Present Illness:  Rosita Lemus is a 73 year old female w/ PMHx of BMI 36.33, DM2, diverticulosis c/b diverticulitis, CAD s/p PCI 12/2024, HFpEF, HTN, HLD, LILY who presents to the ED with bloody stools.    Pt states she woke early this am with urge to defecate associated with bloating.  She had 5-6 episodes of BRB with brown/mushy stool.  After her last BM, she felt clammy, dizzy and weak so came to the ED.  She denies ABD pain, N/V, dyspepsia, black/maroon stools, constipation/diarrhea, unintentional weight loss, chest pain/SOB and NSAID use.  No blood thinners.  Has not eaten yet today.    Pertinent Family Hx:  - No known history of esophageal or gastric CA. Father Dx colon cancer in his 60's  - No known IBD  - No known liver pathologies    Endoscopy Hx:  - Colonoscopy 12/2022:   Tattoo and polypectomy scar encountered in the cecum as per previous history, carefully examined with no recurrence identified.  Photograph taken.  Moderate severity diverticulosis of the ascending colon; severe clonic diverticulosis descending and sigmoid colon.  Large redundant internal hemorrhoids with red marks.    Social Hx:  - No tobacco use/No ETOH  - Denies cannabis/illicit drug use  - Lives with daughter  - Occupation: Retired CTA      History:  Past Medical History:    Diabetes (HCC)    Diabetes mellitus (HCC)    Diverticulitis    Essential hypertension    High blood pressure    High cholesterol    Hyperlipidemia    Sleep apnea     Past Surgical History:   Procedure Laterality Date    Angiogram N/A 01/07/2022    Cataract extraction w/   intraocular lens implant Right 2017    Dr. Griffin    Colonoscopy N/A 6/5/2020    Procedure: COLONOSCOPY;  Surgeon: Ye Ling MD;  Location: [facility] ENDOSCOPY    Colonoscopy screening - referral N/A 12/27/2022    Procedure: COLONOSCOPY-SCREENING;  Surgeon: Ye Ling MD;  Location: [facility] ENDOSCOPY    Hysterectomy      Vit for macular hole Right 2016    surgery for macular hole in 2016- Dr. Ross?    Yag capsulotomy - od - right eye Right 2017    Dr. Griffin     Family History   Problem Relation Age of Onset    Cancer Father 62        COLON CANCER    Hypertension Mother     Other (Other) Mother         CIRCULATION PROBLEM    Other (CHF) Other     Macular degeneration Neg     Glaucoma Neg       reports that she has never smoked. She has never used smokeless tobacco. She reports that she does not currently use alcohol. She reports that she does not use drugs.    Allergies:  Allergies   Allergen Reactions    Flu Virus Vaccine OTHER (SEE COMMENTS)     Other reaction(s): Propensity to adverse reactions to drug    Sulfa Antibiotics OTHER (SEE COMMENTS)    Other OTHER (SEE COMMENTS)     wool       Medications:  No current facility-administered medications for this encounter.    Review of Systems:   CONSTITUTIONAL:  negative for fevers, + chills, negative unintentional weight loss   EYES:  negative for diplopia or change in vision   RESPIRATORY:  negative for severe shortness of breath  CARDIOVASCULAR:  negative for crushing sub-sternal chest pain  GASTROINTESTINAL:  see HPI  GENITOURINARY:  negative for dysuria or gross hematuria  INTEGUMENT/BREAST:  SKIN:  negative for jaundice or new rash   ALLERGIC/IMMUNOLOGIC:  negative for hay fever   ENDOCRINE:  negative for cold intolerance and heat intolerance  MUSCULOSKELETAL:  negative for joint effusion/severe erythema  NEURO: negative for new loss of consciousness or dizziness   BEHAVIOR/PSYCH:  negative for psychotic behavior    Physical Exam:    Blood  pressure 138/68, pulse 66, temperature 98.2 °F (36.8 °C), temperature source Oral, resp. rate 15, height 5' 9\" (1.753 m), weight 246 lb (111.6 kg), SpO2 99%. Body mass index is 36.33 kg/m².    Gen: awake, alert patient, NAD  HEENT: EOMI, the sclera appears anicteric, oropharynx clear, mucus membranes appear moist  CV: RRR  Lung: no conversational dyspnea   Abdomen: soft NTND abdomen with NABS appreciated   Back: No CVA tenderness   Skin: dry, warm, no jaundice  Ext: +nonpitting BLE edema is evident  Neuro: Alert, oriented x4 and interactive  Psych: calm, cooperative    Laboratory Data:  Lab Results   Component Value Date    WBC 5.5 08/21/2024    HGB 10.7 08/21/2024    HCT 33.8 08/21/2024    .0 08/21/2024    CREATSERUM 0.97 08/21/2024    BUN 15 08/21/2024     08/21/2024    K 4.2 08/21/2024     08/21/2024    CO2 25.0 08/21/2024     08/21/2024    CA 9.0 08/21/2024    PTT 21.9 08/21/2024    INR 1.12 08/21/2024       Imaging:  No results found.    Assessment & Plan   Rosita Lemus is a 73 year old female w/ PMHx of BMI 36.33, DM2, diverticulosis c/b diverticulitis, CKD III, CAD s/p PCI 2022 (off plavix), HFpEF, HTN, HLD, LILY who presents to the ED with bloody stools.    #Hematochezia  -Labs on admission with INR 1.12, Hgb 10.7 with baseline 11-12  -Last colonoscopy 2022 with diverticulosis and large internal hemorrhoids  -Etiology possible diverticular bleed vs hemorrhoidal vs AVM, recommend colonoscopy to further evaluate.  Risks/benefits of procedure discussed with Pt who states understanding and is willing to proceed.  -Follows with cardiology for CAD and HF.  Recent Eccho with preserved EF, on lasix    Colonoscopy consent: I have discussed the risks, benefits, and alternatives to colonoscopy with the patient/primary decision maker [who demonstrated understanding], including but not limited to the risks of bleeding, infection, pain, death, as well as the risks of anesthesia and perforation  all leading to prolonged hospitalization, surgical intervention, or even death. I also specifically mentioned the miss rate of colonoscopy of 5-10% in the best of all circumstances. The patient has agreed to sign an informed consent and elected to proceed with colonoscopy with possible intervention [i.e. polypectomy, stent placement, etc.] as indicated.     Recommend:  -Plenvu now  -Otherwise NPO  -Trend Hgb   -Transfuse to goal hgb >7  -Monitor for overt GIB    Thank you for the opportunity to participate in the care of this patient.    Case discussed with Vita Adams MD.    Roslyn Nicole [facility], [facility] G[facility]  8/21/2024

## 2024-08-21 NOTE — ED QUICK NOTES
Orders for admission, patient is aware of plan and ready to go upstairs. Any questions, please call ED BRIGID Palmer at extension [phone].     Patient Covid vaccination status: Fully vaccinated     COVID Test Ordered in ED: None    COVID Suspicion at Admission: N/A    Running Infusions:  None    Mental Status/LOC at time of transport: A&Ox4    Other pertinent information:   CIWA score: N/A   NIH score:  N/A

## 2024-08-21 NOTE — ED INITIAL ASSESSMENT (HPI)
Patient reports five episodes of red bloody stool. Hx of diverticulitis.  Reports dizziness. Denies pain.

## 2024-08-21 NOTE — ANESTHESIA POSTPROCEDURE EVALUATION
Patient: Rosita Lemus    Procedure Summary       Date: 08/21/24 Room / Location: [facility] ENDOSCOPY 01 / [facility] ENDOSCOPY    Anesthesia Start: 1435 Anesthesia Stop: 1512    Procedure: COLONOSCOPY Diagnosis: (diverticulosis)    Surgeons: Vita Adams MD Anesthesiologist: Sarah Castellanos CRNA    Anesthesia Type: general, MAC ASA Status: 3            Anesthesia Type: general, MAC    Vitals Value Taken Time   BP 99/58 08/21/24 1512   Temp 98 08/21/24 1512   Pulse 65 08/21/24 1512   Resp 16 08/21/24 1512   SpO2 100 08/21/24 [facility]       [facility] AN Post Evaluation:   Patient Evaluated in PACU  Patient Participation: complete - patient participated  Level of Consciousness: sleepy but conscious  Pain Score: 0  Pain Management: adequate  Airway Patency:patent  Dental exam unchanged from preop  Yes    Cardiovascular Status: stable  Respiratory Status: acceptable and nasal cannula  Postoperative Hydration stable      Sarah Castellanos CRNA  8/21/2024 3:12 PM

## 2024-08-21 NOTE — H&P (VIEW-ONLY)
Is this a shared or split note between Advanced Practice Provider and Physician? Yes      [facility]   Gastroenterology Consultation Note    Rosita Lemus  Patient Status:    Emergency  Date of Admission:         8/21/2024, Hospital day #0  Attending:   Wilbert Oswald*  PCP:     Fernanda Tolbert MD    Reason for Consultation:  Hematochezia    History of Present Illness:  Rosita Lemus is a 73 year old female w/ PMHx of BMI 36.33, DM2, diverticulosis c/b diverticulitis, CAD s/p PCI 12/2024, HFpEF, HTN, HLD, LILY who presents to the ED with bloody stools.    Pt states she woke early this am with urge to defecate associated with bloating.  She had 5-6 episodes of BRB with brown/mushy stool.  After her last BM, she felt clammy, dizzy and weak so came to the ED.  She denies ABD pain, N/V, dyspepsia, black/maroon stools, constipation/diarrhea, unintentional weight loss, chest pain/SOB and NSAID use.  No blood thinners.  Has not eaten yet today.    Pertinent Family Hx:  - No known history of esophageal or gastric CA. Father Dx colon cancer in his 60's  - No known IBD  - No known liver pathologies    Endoscopy Hx:  - Colonoscopy 12/2022:   Tattoo and polypectomy scar encountered in the cecum as per previous history, carefully examined with no recurrence identified.  Photograph taken.  Moderate severity diverticulosis of the ascending colon; severe clonic diverticulosis descending and sigmoid colon.  Large redundant internal hemorrhoids with red marks.    Social Hx:  - No tobacco use/No ETOH  - Denies cannabis/illicit drug use  - Lives with daughter  - Occupation: Retired [employer]      History:  Past Medical History:    Diabetes (HCC)    Diabetes mellitus (HCC)    Diverticulitis    Essential hypertension    High blood pressure    High cholesterol    Hyperlipidemia    Sleep apnea     Past Surgical History:   Procedure Laterality Date    Angiogram N/A 01/07/2022    Cataract extraction w/   intraocular lens implant Right 2017    Dr. Griffin    Colonoscopy N/A 6/5/2020    Procedure: COLONOSCOPY;  Surgeon: Ye Ling MD;  Location: [facility] ENDOSCOPY    Colonoscopy screening - referral N/A 12/27/2022    Procedure: COLONOSCOPY-SCREENING;  Surgeon: Ye Ling MD;  Location: [facility] ENDOSCOPY    Hysterectomy      Vit for macular hole Right 2016    surgery for macular hole in 2016- Dr. Ross?    Yag capsulotomy - od - right eye Right 2017    Dr. Griffin     Family History   Problem Relation Age of Onset    Cancer Father 62        COLON CANCER    Hypertension Mother     Other (Other) Mother         CIRCULATION PROBLEM    Other (CHF) Other     Macular degeneration Neg     Glaucoma Neg       reports that she has never smoked. She has never used smokeless tobacco. She reports that she does not currently use alcohol. She reports that she does not use drugs.    Allergies:  Allergies   Allergen Reactions    Flu Virus Vaccine OTHER (SEE COMMENTS)     Other reaction(s): Propensity to adverse reactions to drug    Sulfa Antibiotics OTHER (SEE COMMENTS)    Other OTHER (SEE COMMENTS)     wool       Medications:  No current facility-administered medications for this encounter.    Review of Systems:   CONSTITUTIONAL:  negative for fevers, + chills, negative unintentional weight loss   EYES:  negative for diplopia or change in vision   RESPIRATORY:  negative for severe shortness of breath  CARDIOVASCULAR:  negative for crushing sub-sternal chest pain  GASTROINTESTINAL:  see HPI  GENITOURINARY:  negative for dysuria or gross hematuria  INTEGUMENT/BREAST:  SKIN:  negative for jaundice or new rash   ALLERGIC/IMMUNOLOGIC:  negative for hay fever   ENDOCRINE:  negative for cold intolerance and heat intolerance  MUSCULOSKELETAL:  negative for joint effusion/severe erythema  NEURO: negative for new loss of consciousness or dizziness   BEHAVIOR/PSYCH:  negative for psychotic behavior    Physical Exam:    Blood  pressure 138/68, pulse 66, temperature 98.2 °F (36.8 °C), temperature source Oral, resp. rate 15, height 5' 9\" (1.753 m), weight 246 lb (111.6 kg), SpO2 99%. Body mass index is 36.33 kg/m².    Gen: awake, alert patient, NAD  HEENT: EOMI, the sclera appears anicteric, oropharynx clear, mucus membranes appear moist  CV: RRR  Lung: no conversational dyspnea   Abdomen: soft NTND abdomen with NABS appreciated   Back: No CVA tenderness   Skin: dry, warm, no jaundice  Ext: +nonpitting BLE edema is evident  Neuro: Alert, oriented x4 and interactive  Psych: calm, cooperative    Laboratory Data:  Lab Results   Component Value Date    WBC 5.5 08/21/2024    HGB 10.7 08/21/2024    HCT 33.8 08/21/2024    .0 08/21/2024    CREATSERUM 0.97 08/21/2024    BUN 15 08/21/2024     08/21/2024    K 4.2 08/21/2024     08/21/2024    CO2 25.0 08/21/2024     08/21/2024    CA 9.0 08/21/2024    PTT 21.9 08/21/2024    INR 1.12 08/21/2024       Imaging:  No results found.    Assessment & Plan   Rosita Lemus is a 73 year old female w/ PMHx of BMI 36.33, DM2, diverticulosis c/b diverticulitis, CKD III, CAD s/p PCI 2022 (off plavix), HFpEF, HTN, HLD, LILY who presents to the ED with bloody stools.    #Hematochezia  -Labs on admission with INR 1.12, Hgb 10.7 with baseline 11-12  -Last colonoscopy 2022 with diverticulosis and large internal hemorrhoids  -Etiology possible diverticular bleed vs hemorrhoidal vs AVM, recommend colonoscopy to further evaluate.  Risks/benefits of procedure discussed with Pt who states understanding and is willing to proceed.  -Follows with cardiology for CAD and HF.  Recent Eccho with preserved EF, on lasix    Colonoscopy consent: I have discussed the risks, benefits, and alternatives to colonoscopy with the patient/primary decision maker [who demonstrated understanding], including but not limited to the risks of bleeding, infection, pain, death, as well as the risks of anesthesia and perforation  all leading to prolonged hospitalization, surgical intervention, or even death. I also specifically mentioned the miss rate of colonoscopy of 5-10% in the best of all circumstances. The patient has agreed to sign an informed consent and elected to proceed with colonoscopy with possible intervention [i.e. polypectomy, stent placement, etc.] as indicated.     Recommend:  -Plenvu now  -Otherwise NPO  -Trend Hgb   -Transfuse to goal hgb >7  -Monitor for overt GIB    Thank you for the opportunity to participate in the care of this patient.    Case discussed with Vita Adams MD.    Roslyn Nicole [facility], [facility] Gastroenterology  8/21/2024

## 2024-08-21 NOTE — ANESTHESIA PREPROCEDURE EVALUATION
Anesthesia PreOp Note    HPI:     Rosita Lemus is a 73 year old female who presents for preoperative consultation requested by: Vita Adams MD    Date of Surgery: 8/21/2024    Procedure(s):  COLONOSCOPY  Indication: hematochezia    Relevant Problems   No relevant active problems       NPO:  Last Liquid Consumption Date: 08/21/24  Last Liquid Consumption Time: 1230  Last Solid Consumption Date: 08/20/24     Last Liquid Consumption Date: 08/21/24          History Review:  Patient Active Problem List    Diagnosis Date Noted    BRBPR (bright red blood per rectum) 08/21/2024    Severe obesity (BMI 35.0-39.9) with comorbidity ([facility]) 07/16/2024    LILY (obstructive sleep apnea) 06/04/2024    Chronic heart failure with preserved ejection fraction (HFpEF) ([facility]) 12/29/2023    Pseudophakia of right eye 03/22/2022    Age-related nuclear cataract of left eye 03/22/2022    Iron deficiency anemia due to chronic blood loss 03/08/2022    S/P angioplasty with stent 03/08/2022    CKD stage 3 due to type 2 diabetes mellitus ([facility]) 03/08/2022    Pulmonary nodule 12/07/2021    Diastolic dysfunction 10/19/2021    Diabetes mellitus type 2 without retinopathy ([facility]) 09/28/2021    Hyperlipidemia 09/28/2021    Primary hypertension 09/28/2021    Vitamin D deficiency 09/28/2021       Past Medical History:    Diabetes (HCC)    Diabetes mellitus ([facility])    Diverticulitis    Essential hypertension    High blood pressure    High cholesterol    Hyperlipidemia    Sleep apnea       Past Surgical History:   Procedure Laterality Date    Angiogram N/A 01/07/2022    Cataract extraction w/  intraocular lens implant Right 2017    Dr. Griffin    Colonoscopy N/A 6/5/2020    Procedure: COLONOSCOPY;  Surgeon: Ye Ling MD;  Location: [facility] ENDOSCOPY    Colonoscopy screening - referral N/A 12/27/2022    Procedure: COLONOSCOPY-SCREENING;  Surgeon: Ye Ling MD;  Location: [facility] ENDOSCOPY    Hysterectomy      Vit for macular hole Right  2016    surgery for macular hole in 2016- Dr. Ross?    Yag capsulotomy - od - right eye Right 2017    Dr. Griffin       Medications Prior to Admission   Medication Sig Dispense Refill Last Dose    lisinopril 40 MG Oral Tab Take 1 tablet (40 mg total) by mouth daily. 30 tablet 5 Past Week    empagliflozin 10 MG Oral Tab Take 0.5 tablets (5 mg total) by mouth daily.   8/19/2024    amLODIPine 10 MG Oral Tab Take 1 tablet (10 mg total) by mouth at bedtime.   Past Week    ROSUVASTATIN 40 MG Oral Tab TAKE ONE TABLET BY MOUTH DAILY 90 tablet 0 Past Week    CARVEDILOL 25 MG Oral Tab TAKE ONE TABLET BY MOUTH TWICE A DAY WITH MEALS 180 tablet 1 Past Week    metFORMIN 500 MG Oral Tab Take 1 tablet (500 mg total) by mouth 2 (two) times daily with meals. (Patient taking differently: Take 1 tablet (500 mg total) by mouth daily with breakfast.) 180 tablet 1 8/19/2024    aspirin 81 MG Oral Tab EC Take 1 tablet (81 mg total) by mouth daily.   Past Week    SITagliptin Phosphate (JANUVIA) 50 MG Oral Tab Take 1 tablet (50 mg total) by mouth daily. (Patient taking differently: Take 2 tablets (100 mg total) by mouth Noon.) 90 tablet 1 8/19/2024    furosemide 20 MG Oral Tab Take 1 tablet (20 mg total) by mouth As Directed. 40 mg on Monday and Friday, , 20 mg on Wednesdays 08/19/2024    famotidine 10 MG Oral Tab Take 1 tablet (10 mg total) by mouth daily as needed for Heartburn.   Unknown    Elastic Bandages & Supports (MEDICAL COMPRESSION STOCKINGS) Does not apply Misc 1 each daily. Knee high 15-20 mmhg wear during the day off at night 1 each 0     ACCU-CHEK SOFTCLIX LANCETS Does not apply Misc TEST BLOOD GLUCOSE ONCE DAILY 100 each 3     Glucose Blood (ACCU-CHEK MILLA PLUS) In Vitro Strip 2 (two) times a day.        Current Facility-Administered Medications Ordered in Epic   Medication Dose Route Frequency Provider Last Rate Last Admin    glucose (Dex4) 15 GM/59ML oral liquid 15 g  15 g Oral Q15 Min Yahaira Fraser MD        Or     glucose (Glutose) 40% oral gel 15 g  15 g Oral Q15 Min PRN Yahaira Patino MD        Or    glucose-vitamin C (Dex-4) chewable tab 4 tablet  4 tablet Oral Q15 Min PRN Yahaira Patino MD        Or    dextrose 50% injection 50 mL  50 mL Intravenous Q15 Min PRN Yahaira Patino MD        Or    glucose (Dex4) 15 GM/59ML oral liquid 30 g  30 g Oral Q15 Min PRN Yahaira Patino MD        Or    glucose (Glutose) 40% oral gel 30 g  30 g Oral Q15 Min PRN Yahaira Patino MD        Or    glucose-vitamin C (Dex-4) chewable tab 8 tablet  8 tablet Oral Q15 Min PRN Yahaira Patino MD        sodium chloride 0.9% infusion   Intravenous Continuous Yahaira Patino MD 75 mL/hr at 08/21/24 1344 New Bag at 08/21/24 1344    acetaminophen (Tylenol Extra Strength) tab 500 mg  500 mg Oral Q4H PRN Yahaira Patino MD        ondansetron (Zofran) 4 MG/2ML injection 4 mg  4 mg Intravenous Q6H PRN Yahaira Patino MD   4 mg at 08/21/24 1343    metoclopramide (Reglan) 5 mg/mL injection 10 mg  10 mg Intravenous Q8H PRN Yahaira Patino MD        insulin aspart (NovoLOG) 100 Units/mL FlexPen 1-5 Units  1-5 Units Subcutaneous TID CC Yahaira Patino MD        rosuvastatin (Crestor) tab 40 mg  40 mg Oral Daily Yahaira Patino MD         No current Epic-ordered outpatient medications on file.       Allergies   Allergen Reactions    Flu Virus Vaccine OTHER (SEE COMMENTS)     Other reaction(s): Propensity to adverse reactions to drug    Sulfa Antibiotics OTHER (SEE COMMENTS)    Other OTHER (SEE COMMENTS)     wool       Family History   Problem Relation Age of Onset    Cancer Father 62        COLON CANCER    Hypertension Mother     Other (Other) Mother         CIRCULATION PROBLEM    Other (CHF) Other     Macular degeneration Neg     Glaucoma Neg      Social History     Socioeconomic History    Marital status: Single   Tobacco Use    Smoking status: Never    Smokeless tobacco: Never   Vaping Use    Vaping status: Never Used    Substance and Sexual Activity    Alcohol use: Not Currently    Drug use: Never       Available pre-op labs reviewed.  Lab Results   Component Value Date    WBC 5.5 08/21/2024    RBC 3.66 (L) 08/21/2024    HGB 11.4 (L) 08/21/2024    HCT 36.3 08/21/2024    MCV 92.3 08/21/2024    MCH 29.2 08/21/2024    MCHC 31.7 08/21/2024    RDW 15.5 (H) 08/21/2024    .0 08/21/2024     Lab Results   Component Value Date     08/21/2024    K 4.2 08/21/2024     (H) 08/21/2024    CO2 25.0 08/21/2024    BUN 15 08/21/2024    CREATSERUM 0.97 08/21/2024     (H) 08/21/2024    PGLU 126 (H) 08/21/2024    CA 9.0 08/21/2024     Lab Results   Component Value Date    INR 1.12 08/21/2024       Vital Signs:  Body mass index is 36.33 kg/m².   height is 1.753 m (5' 9\") and weight is 111.6 kg (246 lb). Her oral temperature is 98.4 °F (36.9 °C). Her blood pressure is 131/71 and her pulse is 70. Her respiration is 22 and oxygen saturation is 99%.   Vitals:    08/21/24 1304 08/21/24 1405 08/21/24 1418 08/21/24 1420   BP:  112/66 131/71 131/71   Pulse:  72 69 70   Resp:  20 20 22   Temp:       TempSrc:       SpO2:  100% 95% 99%   Weight: 111.6 kg (246 lb)  111.6 kg (246 lb)    Height:   1.753 m (5' 9\")         Anesthesia Evaluation     Patient summary reviewed and Nursing notes reviewed    History of anesthetic complications   Airway   Mallampati: II  TM distance: >3 FB  Neck ROM: full  Dental          Pulmonary - normal exam   (+) sleep apnea    ROS comment: Non-compliance with CPAP  Cardiovascular - normal exam  Exercise tolerance: poor  (+) hypertension, CHF    ROS comment: Cardiomemes device    Sinus bradycardia   Minimal voltage criteria for LVH, may be normal variant ( [place] product )   Borderline ECG   No previous ECGs found in [facility]   Confirmed by Ankit Anguiano ([id number]) on 12/19/2023 5:41:2    7/6/23 TTE  Summary:     1. Left ventricle: The cavity size is normal. Wall thickness is at the upper      limits of normal. Systolic  function is normal. The estimated ejection      fraction is 55-60%. Wall motion is normal; there are no regional wall      motion abnormalities. There is diastolic dysfunction.   2. Mitral valve: There is mild regurgitation.   3. Left atrium: The atrium is moderately dilated.   4. Right atrium: The atrium is mildly to moderately dilated. Central venous      pressure (est): 8mm Hg.   5. Tricuspid valve: There is mild-moderate regurgitation.   6. Pulmonary arteries: Systolic pressure is mildly increased. PA peak      pressure: 35mm Hg (S).       Neuro/Psych - negative ROS     GI/Hepatic/Renal    (+) chronic renal disease CRI, bowel prep    Endo/Other    (+) diabetes mellitus type 2    Comments: Anemia  Abdominal   (+) obese                 Anesthesia Plan:   ASA:  3  Plan:   General and MAC  Informed Consent Plan and Risks Discussed With:  Patient  Discussed plan with:  Surgeon      I have informed Rosita Lemus and/or legal guardian or family member of the nature of the anesthetic plan, benefits, risks including possible dental damage if relevant, major complications, and any alternative forms of anesthetic management.   All of the patient's questions were answered to the best of my ability. The patient desires the anesthetic management as planned.  CARLOS FRANCES CRNA  8/21/2024 2:27 PM  Present on Admission:  **None**

## 2024-08-21 NOTE — RESPIRATORY THERAPY NOTE
Pt stated that she has a bipap at home but has not used it in years and she said that she wont be using it since she doesn't like to use a full face mask. I did offer the nasal mask but pt said that she sleeps with her mouth open and will not help her. Will continue to follow.

## 2024-08-21 NOTE — CONSULTS
[facility] - CARDIOLOGY CONSULT NOTE    Rosita Lemus Patient Status:  Inpatient    1950 MRN [id number]   Location [facility]5W Attending Yahaira Patino MD   Hosp Day # 0 PCP Fernanda Tolbert MD     Date of Admission:  2024  Date of Consult:  2024  I was asked by Yahaira Patino MD to provide recommendations for evaluation and management of cardiac issues.  Impression:     BRBPR (bright red blood per rectum)  Treatment per PCP and GI.  Medically stable from cardiac standpoint for GI testing    History of of preserved ejection fraction heart failure  No heart failure at this time.  Mild ankle swelling chronic and may be associate with amlodipine.  Continue to monitor CardioMEMS numbers to help with diuresis if needed.  Continue carvedilol amlodipine and lisinopril.  Echocardiogram 2023 showed normal LV function mild MR left atrial enlargement mild to moderate TR with a PA pressure 35.    History of CAD  No ischemic symptoms.  Resume baby aspirin when okay with GI.  Continue treatment for hypertension which is stable and cholesterol.  LDL was 61 yesterday    Recommendations:  As above    Thank you for allowing me to participate in the care of your patient.    Андрей Little MD  [facility]   2024    Reason for Consultation:   History of cardio mems with no bleeding    History of Present Illness:   Patient is a 73 year old female who was admitted to the hospital for BRBPR (bright red blood per rectum):  This is a pleasant 73-year-old with history of preserved ejection fraction heart failure CardioMEMS diabetes coronary disease status post PCI in 2022 to the LAD,hypertension elevated cholesterol and diverticulitis who noted bright red blood per rectum and presents for evaluation.  She has no chest pain shortness of breath palpitations or dizziness.  She normally follows with Dr. Saladna.  Echocardiogram in  showed normal LV  function with mild MR and left atrial enlargement.  CardioMEMS reading today was 8 and the goal is 11-17.  Recent readings of 10-13.    Patient only on aspirin for blood thinners.  Cardiac tests:    Past Medical History  Past Medical History:    Diabetes (HCC)    Diabetes mellitus (HCC)    Diverticulitis    Essential hypertension    High blood pressure    High cholesterol    Hyperlipidemia    Sleep apnea       Past Surgical History  Past Surgical History:   Procedure Laterality Date    Angiogram N/A 01/07/2022    Cataract extraction w/  intraocular lens implant Right 2017    Dr. Griffin    Colonoscopy N/A 6/5/2020    Procedure: COLONOSCOPY;  Surgeon: Ye Ling MD;  Location: [facility] ENDOSCOPY    Colonoscopy screening - referral N/A 12/27/2022    Procedure: COLONOSCOPY-SCREENING;  Surgeon: Ye Ling MD;  Location: [facility] ENDOSCOPY    Hysterectomy      Vit for macular hole Right 2016    surgery for macular hole in 2016- Dr. Ross?    Yag capsulotomy - od - right eye Right 2017    Dr. Griffin       Family History  Family History   Problem Relation Age of Onset    Cancer Father 62        COLON CANCER    Hypertension Mother     Other (Other) Mother         CIRCULATION PROBLEM    Other (CHF) Other     Macular degeneration Neg     Glaucoma Neg        Social History  Pediatric History   Patient Parents    Not on file     Other Topics Concern    Not on file   Social History Narrative    Not on file           Current Medications:  Current Facility-Administered Medications   Medication Dose Route Frequency    glucose (Dex4) 15 GM/59ML oral liquid 15 g  15 g Oral Q15 Min PRN    Or    glucose (Glutose) 40% oral gel 15 g  15 g Oral Q15 Min PRN    Or    glucose-vitamin C (Dex-4) chewable tab 4 tablet  4 tablet Oral Q15 Min PRN    Or    dextrose 50% injection 50 mL  50 mL Intravenous Q15 Min PRN    Or    glucose (Dex4) 15 GM/59ML oral liquid 30 g  30 g Oral Q15 Min PRN    Or    glucose (Glutose) 40% oral gel  30 g  30 g Oral Q15 Min PRN    Or    glucose-vitamin C (Dex-4) chewable tab 8 tablet  8 tablet Oral Q15 Min PRN    sodium chloride 0.9% infusion   Intravenous Continuous    acetaminophen (Tylenol Extra Strength) tab 500 mg  500 mg Oral Q4H PRN    ondansetron (Zofran) 4 MG/2ML injection 4 mg  4 mg Intravenous Q6H PRN    metoclopramide (Reglan) 5 mg/mL injection 10 mg  10 mg Intravenous Q8H PRN    insulin aspart (NovoLOG) 100 Units/mL FlexPen 1-5 Units  1-5 Units Subcutaneous TID CC    rosuvastatin (Crestor) tab 40 mg  40 mg Oral Daily    polyethylene glycol-electrolyte solution (Plenvu) 140 g SOLR 480 mL  480 mL Oral Once     Medications Prior to Admission   Medication Sig    lisinopril 40 MG Oral Tab Take 1 tablet (40 mg total) by mouth daily.    empagliflozin 10 MG Oral Tab Take 0.5 tablets (5 mg total) by mouth daily.    amLODIPine 10 MG Oral Tab Take 1 tablet (10 mg total) by mouth at bedtime.    ROSUVASTATIN 40 MG Oral Tab TAKE ONE TABLET BY MOUTH DAILY    CARVEDILOL 25 MG Oral Tab TAKE ONE TABLET BY MOUTH TWICE A DAY WITH MEALS    metFORMIN 500 MG Oral Tab Take 1 tablet (500 mg total) by mouth 2 (two) times daily with meals. (Patient taking differently: Take 1 tablet (500 mg total) by mouth daily with breakfast.)    aspirin 81 MG Oral Tab EC Take 1 tablet (81 mg total) by mouth daily.    SITagliptin Phosphate (JANUVIA) 50 MG Oral Tab Take 1 tablet (50 mg total) by mouth daily. (Patient taking differently: Take 2 tablets (100 mg total) by mouth Noon.)    furosemide 20 MG Oral Tab Take 1 tablet (20 mg total) by mouth As Directed. 40 mg on Monday and Friday, , 20 mg on Wednesdays    famotidine 10 MG Oral Tab Take 1 tablet (10 mg total) by mouth daily as needed for Heartburn.    Elastic Bandages & Supports (MEDICAL COMPRESSION STOCKINGS) Does not apply Misc 1 each daily. Knee high 15-20 mmhg wear during the day off at night    ACCU-CHEK SOFTCLIX LANCETS Does not apply Misc TEST BLOOD GLUCOSE ONCE DAILY    Glucose  Blood (ACCU-CHEK MILLA PLUS) In Vitro Strip 2 (two) times a day.       Allergies  Allergies   Allergen Reactions    Flu Virus Vaccine OTHER (SEE COMMENTS)     Other reaction(s): Propensity to adverse reactions to drug    Sulfa Antibiotics OTHER (SEE COMMENTS)    Other OTHER (SEE COMMENTS)     wool       Review of Systems:   10 pt ROS performed, separate from HPI  Review of Systems:  GENERAL: no fevers, chills, sweats  HEENT: no visual or hearing changes  SKIN: denies any unusual skin lesions or rashes  RESPIRATORY: denies shortness of breath with exertion  CARDIOVASCULAR: See HPI  GI: denies abdominal pain and denies heartburn  : no dysuria or hematuria  NEURO: denies headaches, focal weaknesses or paresthesias  All other systems were reviewed and negative.  Physical Exam:   Blood pressure 123/71, pulse 67, temperature 98.4 °F (36.9 °C), temperature source Oral, resp. rate 18, height 175.3 cm (5' 9\"), weight 246 lb (111.6 kg), SpO2 100%.  Intake/Output:           Last 24 hours: No intake or output data in the 24 hours ending 08/21/24 1138   This shift: No intake/output data recorded.    Scheduled Meds:    insulin aspart  1-5 Units Subcutaneous TID CC    rosuvastatin  40 mg Oral Daily    polyethylene glycol-electrolyte solution  480 mL Oral Once         Physical Exam:    General: Comfortable, well-nourished, in no acute distress.  HEENT: Atraumatic.  No scleral icterus.  Mucous membranes are moist.  Oropharynx is clear.  Neck: supple,No JVD, carotids 2+, no bruits  Cardiac: Regular rate and rhythm.S1,S2 normal, No pathologic murmur.  Lungs: Clear with normal effort.  Normal excursions and effort.  Abdomen: Soft, non-tender. BS-present.  Extremities: Without clubbing, cyanosis.trace ankle edema peripheral pulses present.  Neurologic: Alert and oriented x 3, normal affect. No dysarthria or gross motor deficits.  Psychiatric: Coooperative, calm. Alert and oriented x 3  Skin: Warm and dry. No rash    Results:      Laboratory Data:  Lab Results   Component Value Date    WBC 5.5 08/21/2024    HGB 10.7 (L) 08/21/2024    HCT 33.8 (L) 08/21/2024    .0 08/21/2024    CREATSERUM 0.97 08/21/2024    BUN 15 08/21/2024     08/21/2024    K 4.2 08/21/2024     (H) 08/21/2024    CO2 25.0 08/21/2024     (H) 08/21/2024    CA 9.0 08/21/2024    ALB 3.6 04/20/2022    ALB 4.00 04/20/2022    ALKPHO 88 04/20/2022    TP 7.2 04/20/2022    TP 7.3 04/20/2022    AST 13 (L) 04/20/2022    ALT 18 04/20/2022    PTT 21.9 (L) 08/21/2024    INR 1.12 08/21/2024    PTP 15.1 (H) 08/21/2024    T4F 1.0 12/05/2021    TSH 2.170 12/05/2021    ESRML 20 04/20/2022    CRP <0.29 04/20/2022    PHOS 3.3 04/20/2022    B12 692 10/14/2021         Imaging:          No results found.       Clear bilaterally, pupils equal, round and reactive to light.

## 2024-08-21 NOTE — HISTORICAL OFFICE NOTE
[facility]  [address], [address] [place] [address]  [phone]      Rosita Lemus  Progress Note  Demographics:  Name: Rosita Lemus YOB: 1950  Age: 73, Female Medical Record No: [id number]  Visited Date/Time: 04/03/2024 08:00 AM    Chief Complaints  follow up   History of Present Illness  72-year-old female being followed for CAD status post PCI December 2021, HFpEF, CKD with fluctuating renal function with lower extremity edema and diuresis.    Continued LE edema, mild    No cardiac symptoms, no shortness of breath or chest pain with exertion.  No palpitations or syncope.  No lower extremity edema, orthopnea or PND.  Cardiac risk factors Never smoked  Past Medical History  1.Edema, unspecified  2.Preop testing  3.Heart failure with mildly reduced ejection fraction  4.Bruise  5.CAD native (coronary artery disease)  6.DM Type 2 (diabetes mellitus)  7.Diastolic dysfunction  8.Hypertension (HTN), primary  9.Hyperlipidemia (unspecified)  10.Type 2 diabetes mellitus, without long-term current use of insulin  11.Hyperlipidemia  12.Primary hypertension  13.Vitamin D deficiency  14.Diastolic dysfunction  15.Type 2 diabetes mellitus with stage 3a chronic kidney disease, without long-term current use of insulin (HCC)  Family History  1. Mother - Hypertension (HTN), primary  Social History  Smoking status Never smoked  Tobacco usage - No (Non-smoker for personal reasons (finding))  Review of systems  Constitutional Weight Loss  Cardiovascular No history of Chest pain, CALVO, Palpitations, Syncope, PND, Orthopnea, Edema and Claudication  Respiratory No history of SOB, Wheezing and Sputum  Hem/Lymphatic No history of Easy bruising, Blood clots, Hx of blood transfusion, Anemia and Bleeding problems  Physical Examination  Constitutional o2 93% ra  Vitals Left Arm Sitting  / 60 mmHg, Pulse rate 80 bpm, Height in 5' 9\", BMI: 34.8, Weight in 236 lbs (or) 107  kgs and BSA : 2.32 cc/m²  General Appearance No Acute Distress and Well groomed  Cardiovascular   EKG/Other abnormalities  General: NAD  HEENT: Normocephalic, anicteric sclera, neck supple.  Neck: No JVD, carotids 2+, no bruits.  Cardiac: Regular rate and rhythm. S1, S2 normal. No murmur, pericardial rub, S3.  Lungs: Clear without wheezes, rales, rhonchi or dullness.  Normal excursions and effort.  Abdomen: Soft, non-tender. BS-present.  Extremities: Without clubbing, cyanosis or edema.  Peripheral pulses are 2+.  Neurologic: Non-focal  Skin: Warm and dry.  Allergies  1.Flu Virus Vaccine(Reaction:, Severity:Mild)  2.sulfabenzamide - Ingredient(Reaction:Unnown, Severity:Moderate)  Medications  1.amLODIPine 10 mg tablet, Take 1 tablet orally once a day.  2.aspirin 81 mg tablet,delayed release, Take 1 tablet orally once a day.  3.carvedilol 25 MG Oral Tab, Take 25 mg by mouth 2 (two) times daily with meals.  4.clopidogreL 75 mg tablet, Take 1 tablet orally once a day.  5.Januvia 100 mg tablet, Take one-half tablet orally once a day.  6.Jardiance 10 mg tablet, Take 1 tablet orally once a day.  7.Lasix 40 mg tablet, Take 1 tablet orally once a day, Monday, Wedneday, Friday.  8.lisinopriL 10 mg tablet, Take 1 tablet orally once a day.  9.metFORMIN 500 mg tablet, Take 1 tablet orally once a day.  10.Rosuvastatin Calcium 40 MG Oral Tab, Take 40 mg by mouth daily.  Impression  1.Heart failure with mildly reduced ejection fraction  2.Bruise  3.CAD native (coronary artery disease)  4.DM Type 2 (diabetes mellitus)  5.Diastolic dysfunction  6.Hypertension (HTN), primary  7.Hyperlipidemia (unspecified)  8.Type 2 diabetes mellitus, without long-term current use of insulin  9.Hyperlipidemia  10.Primary hypertension  11.Vitamin D deficiency  12.Diastolic dysfunction  13.Type 2 diabetes mellitus with stage 3a chronic kidney disease, without long-term current use of insulin (HCC)  Cardiac History:  HFpEF:  -Echo Oct 2021 normal EF,  grade II diastolic dysfunction, no sig valve disease    CAD:  Anginal equivalent being CP/SOB.  PCI LAD with FAUZIA x 2 Dec 2021  -PET stress Dec 2021 anterolateral perfusion defect  -Calcium score Nov 2021 - 250, predominately in left main    CKD stage III      Assessment & Plan  HFpEF: Having LINDA with diuretics, echo at [facility] with preserved EF.  s/p cardiomems.  continue mems guided volume management.  -stop plavix for cardiomems    Lower extremity edema: Likely multifactorial, in part due to volume retention, see above.  Venous insufficiency and lymphedema also a concern, intravascular volume down based on MEMS.  Right greater than left edema and venous insufficiency, she prefers conservative management for now.    CAD: Status post PCI LAD with FAUZIA x2 Jan 2022, no ischemic symptoms.  Continue monitored exercise.  At elevated bleeding risk given falls, gait imbalance and GI bleeding.    Hypertension:  Controlled on current regimen, no changes    Hyperlipidemia:  On high intensity statin for primary prevention due to history of diabetes, no changes    Diabetes per primary    Follow-up:  Clinic: 6 months  Testing/intervention: none    Recommend low sodium diet, daily exercise and maintain a normal BMI. Recommend monitor blood pressure at home.  Future appointments  1.Referral Visit - Fernanda Tolbert ([email or web address]) : (Today)  2.Follow up visit - Naresh Sladana MD (6 Months)  Miscellaneous  1.Reviewed lower extremity venous ultrasound, nuclear pet with the patient.  2.Weight monitoring (regime/therapy)  Nurses documentation  refills: none  Assistive devices: none  Upcoming sx or procedures: none  EKG: none      Patient instructions  Follow-up:  Clinic: 6 months  Testing/intervention: none  Lab Details  POCT GLUCOSE  12/19/2023 01:27:04 PM  POCT GLUCOSE 104 70-99 mg/dL H F  PROTHROMBIN TIME (PT)  12/04/2023 08:38:59 AM  PROTIME 14.0 11.6-14.8 seconds  F  INR 1.02 0.80-1.20  F  BASIC METABOLIC  PANEL (8)  12/04/2023 08:18:22 AM  GLUCOSE 132 70-99 mg/dL H F  SODIUM 142 136-145 mmol/L  F  POTASSIUM 3.9 3.5-5.1 mmol/L  F  CHLORIDE 108  mmol/L  F  CO2 28.0 21.0-32.0 mmol/L  F  ANION GAP 6 0-18 mmol/L  F  BUN 17 9-23 mg/dL  F  CREATININE 1.01 0.55-1.02 mg/dL  F  BUN/ CREAT RATIO 16.8 10.0-20.0  F  CALCIUM 9.5 8.7-10.4 mg/dL  F  OSMOLALITY CALCULATED 297 275-295 mOsm/kg H F  E GFR CR 60 >=60 mL/min/1.73m2  F  FASTING PATIENT BMP ANSWER No   F  CBC W/ DIFFERENTIAL  12/04/2023 07:44:56 AM  WBC 7.0 4.0-11.0 x10(3) uL  F  RED BLOOD COUNT 4.54 3.80-5.30 x10(6)uL  F  HGB 12.7 12.0-16.0 g/dL  F  HCT 40.1 35.0-48.0 %  F  MEAN CELL VOLUME 88.3 80.0-100.0 fL  F  MEAN CORPUSCULAR HEMOGLOBIN 28.0 26.0-34.0 pg  F  MEAN CORPUSCULAR HGB CONC 31.7 31.0-37.0 g/dL  F  RED CELL DISTRIBUTION WIDTH-SD 51.6 35.1-46.3 fL H F  RED CELL DISTRIBUTION WIDTH CV 15.9 11.0-15.0 % H F  PLATELETS 215.0 150.0-450.0 10(3)uL  F  NEUTROPHIL ABS PRELIM 4.30 1.50-7.70 x10 (3) uL  F  NEUTROPHIL ABSOLUTE 4.30 1.50-7.70 x10(3) uL  F  LYMPHOCYTE ABSOLUTE 2.00 1.00-4.00 x10(3) uL  F  MONOCYTE ABSOLUTE 0.46 0.10-1.00 x10(3) uL  F  EOSINOPHIL ABSOLUTE 0.21 0.00-0.70 x10(3) uL  F  BASOPHIL ABSOLUTE 0.03 0.00-0.20 x10(3) uL  F  IMMATURE GRANULOCYTE COUNT 0.02 0.00-1.00 x10(3) uL  F  NEUTROPHIL % 61.2 %  F  LYMPHOCYTE % 28.5 %  F  MONOCYTE % 6.6 %  F  EOSINOPHIL % 3.0 %  F  BASOPHIL % 0.4 %  F  IMMATURE GRANULOCYTE RATIO % 0.3 %  F  PRO BETA NATRIURETIC PEPTIDE  08/30/2023 02:17:54 PM  PRO-BETA NATRIURETIC PEPTIDE 211 <125 pg/mL H F  Diagnostics Details  Lower Extremity Venous Ultrasound 01/04/2024  1.The study quality is good.    2.Exam performed with the patient in reverse Trendelenburg position.    3.Normal compressibility, augmentation, and phasic flow in the bilateral lower extremity venous system. Negative study for DVT.    4.---------------    5.Reflux is noted in the right GSV proximal thigh only.    6.Reflux is noted in the left SFJ  only.    7.Bilateral small saphenous vein does join the popliteal vein to form the sapheno-popliteal confluence.    8.No reflux is noted in the right or left SSV.    9.Incompetent perforators visualized in right proximal calf with diameter measuring 3.1mm with 0.7sec reflux.    10.Incompetent perforators visualized in left distal calf with diameter measuring 2.3mm with 0.5sec reflux.    11.No varicose veins visualized in the lower extremities bilaterally.    Nuclear PET 12/14/2021  1.Stress EKG is non-diagnostic secondary to baseline ST-T wave changes.    1.This is an abnormal perfusion study.    2.Medium-sized, partially reversible anterolateral perfusion defect of moderate intensity.    3.The left ventricular cavity is noted to be normal on the stress studies. The stress left ventricular ejection fraction was calculated to be 67% and left ventricular global function is normal. The rest left ventricular cavity is noted to be normal. The rest left ventricular ejection fraction was calculated to be 65% and rest left ventricular global function is normal.    Care Providers: Naresh Saldana MD, Anaid HENDRICKSON and Lynsey Montoya  Electronically Authenticated by  Naresh Saldana MD  04/03/2024 02:26:21 PM  Disclaimer: Components of this note were documented using voice recognition system and are subject to errors not corrected at proofreading. Contact the author of this note for any clarifications.

## 2024-08-21 NOTE — PLAN OF CARE
CardioMems goal 11-17. Today PAD is 8mmHg. Prior to today PAD has been running between 10-13.     Lynda Cook, APRN  08/21/24   10:39 AM  [phone] [place]  [phone] [place]

## 2024-08-22 VITALS
OXYGEN SATURATION: 99 % | HEART RATE: 63 BPM | BODY MASS INDEX: 35.58 KG/M2 | RESPIRATION RATE: 18 BRPM | WEIGHT: 240.19 LBS | SYSTOLIC BLOOD PRESSURE: 117 MMHG | TEMPERATURE: 98 F | HEIGHT: 69 IN | DIASTOLIC BLOOD PRESSURE: 49 MMHG

## 2024-08-22 LAB
ANION GAP SERPL CALC-SCNC: 5 MMOL/L (ref 0–18)
BUN BLD-MCNC: 9 MG/DL (ref 9–23)
BUN/CREAT SERPL: 11 (ref 10–20)
CALCIUM BLD-MCNC: 8.8 MG/DL (ref 8.7–10.4)
CHLORIDE SERPL-SCNC: 113 MMOL/L (ref 98–112)
CO2 SERPL-SCNC: 28 MMOL/L (ref 21–32)
CREAT BLD-MCNC: 0.82 MG/DL
DEPRECATED RDW RBC AUTO: 53.4 FL (ref 35.1–46.3)
EGFRCR SERPLBLD CKD-EPI 2021: 76 ML/MIN/1.73M2 (ref 60–?)
ERYTHROCYTE [DISTWIDTH] IN BLOOD BY AUTOMATED COUNT: 16 % (ref 11–15)
GLUCOSE BLD-MCNC: 126 MG/DL (ref 70–99)
GLUCOSE BLDC GLUCOMTR-MCNC: 144 MG/DL (ref 70–99)
GLUCOSE BLDC GLUCOMTR-MCNC: 164 MG/DL (ref 70–99)
GLUCOSE BLDC GLUCOMTR-MCNC: 80 MG/DL (ref 70–99)
HCT VFR BLD AUTO: 26 %
HCT VFR BLD AUTO: 27.3 %
HGB BLD-MCNC: 8.4 G/DL
HGB BLD-MCNC: 8.8 G/DL
MCH RBC QN AUTO: 29.5 PG (ref 26–34)
MCHC RBC AUTO-ENTMCNC: 32.3 G/DL (ref 31–37)
MCV RBC AUTO: 91.2 FL
OSMOLALITY SERPL CALC.SUM OF ELEC: 302 MOSM/KG (ref 275–295)
PLATELET # BLD AUTO: 144 10(3)UL (ref 150–450)
POTASSIUM SERPL-SCNC: 3.8 MMOL/L (ref 3.5–5.1)
RBC # BLD AUTO: 2.85 X10(6)UL
SODIUM SERPL-SCNC: 146 MMOL/L (ref 136–145)
WBC # BLD AUTO: 4.4 X10(3) UL (ref 4–11)

## 2024-08-22 PROCEDURE — 99232 SBSQ HOSP IP/OBS MODERATE 35: CPT | Performed by: INTERNAL MEDICINE

## 2024-08-22 RX ORDER — ASPIRIN 81 MG/1
81 TABLET, CHEWABLE ORAL DAILY
Status: DISCONTINUED | OUTPATIENT
Start: 2024-08-22 | End: 2024-08-22

## 2024-08-22 RX ORDER — CARVEDILOL 25 MG/1
25 TABLET ORAL 2 TIMES DAILY WITH MEALS
Status: DISCONTINUED | OUTPATIENT
Start: 2024-08-22 | End: 2024-08-22

## 2024-08-22 RX ORDER — ASPIRIN 81 MG/1
81 TABLET ORAL DAILY
Qty: 30 TABLET | Refills: 0 | Status: SHIPPED | OUTPATIENT
Start: 2024-08-27

## 2024-08-22 RX ORDER — AMLODIPINE BESYLATE 10 MG/1
10 TABLET ORAL NIGHTLY
Status: DISCONTINUED | OUTPATIENT
Start: 2024-08-22 | End: 2024-08-22

## 2024-08-22 NOTE — PLAN OF CARE
Hemoglobin remains low but stable. No hematochezia. She has an appointment with the HF clinic Monday 8/26/24. She will have a CBC prior to her appointment, if hemoglobin has stabilized then will resume Aspirin. Plan added to discharge instructions.     Lynda Cook, APRN  08/22/24   4:34 PM  [phone] [place]  [phone] [place]

## 2024-08-22 NOTE — PROGRESS NOTES
[facility] Cardiology Progress Note    Rosita Lemus Patient Status:  Inpatient    1950 MRN [id number]   Location [facility]5W Attending Yahaira Patino MD   Hosp Day # 1 PCP Fernanda Tolbert MD     Subjective:  Denies cp, sob, logan, orthopnea   No bleeding reported    Objective:  BP 96/48 (BP Location: Right arm)   Pulse 63   Temp 98.4 °F (36.9 °C) (Oral)   Resp 18   Ht 175.3 cm (5' 9\")   Wt 240 lb 3.2 oz (109 kg)   SpO2 99%   BMI 35.47 kg/m²     Telemetry: NSR       Intake/Output:    Intake/Output Summary (Last 24 hours) at 2024 1318  Last data filed at 2024 2300  Gross per 24 hour   Intake 1056.25 ml   Output --   Net 1056.25 ml       Last 3 Weights   24 0541 240 lb 3.2 oz (109 kg)   24 1418 246 lb (111.6 kg)   24 1304 246 lb (111.6 kg)   24 0733 246 lb (111.6 kg)   24 1549 242 lb (109.8 kg)   24 0905 242 lb (109.8 kg)       Labs:  Recent Labs   Lab 24  0858 24  0552   * 126*   BUN 15 9   CREATSERUM 0.97 0.82   EGFRCR 62 76   CA 9.0 8.8    146*   K 4.2 3.8   * 113*   CO2 25.0 28.0     Recent Labs   Lab 24  0858 24  1406 24  0908   RBC 3.66*  --  2.85*   HGB 10.7* 11.4* 8.4*   HCT 33.8* 36.3 26.0*   MCV 92.3  --  91.2   MCH 29.2  --  29.5   MCHC 31.7  --  32.3   RDW 15.5*  --  16.0*   NEPRELIM 3.58  --   --    WBC 5.5  --  4.4   .0  --  144.0*         No results for input(s): \"TROP\", \"TROPHS\", \"CK\" in the last 168 hours.    Diagnostics:         Review of Systems   Respiratory: Negative.  Negative for cough and shortness of breath.    Cardiovascular:  Positive for leg swelling. Negative for chest pain, palpitations and orthopnea.     Physical Exam:    General: Alert and oriented x 3. No apparent distress.   HEENT: Normocephalic, anicteric sclera, neck supple, no thyromegaly or adenopathy.  Neck: No JVD, carotids 2+, no bruits.  Cardiac: Regular rate & rhythm. S1, S2 normal. No  murmur, pericardial rub, S3, or extra cardiac sounds.  Lungs: Clear without wheezes, rales, rhonchi or dullness.  Normal excursions and effort.  Abdomen: Soft, non-tender. No organosplenomegally, mass or rebound, BS-present.  Extremities: Without clubbing or cyanosis. +Trace BLE edema   Neurologic: Alert and oriented, normal affect. No focal defects  Skin: Warm and dry.       Medications:   carvedilol  25 mg Oral BID with meals    amLODIPine  10 mg Oral Nightly    insulin aspart  1-5 Units Subcutaneous TID CC    rosuvastatin  40 mg Oral Daily         Assessment:    BRBPR (bright red blood per rectum)  -  8/21 s/p colonoscopy without active bleed      Chronic HFpEF  - No heart failure at this time.  Mild ankle swelling chronic and may be associate with amlodipine.    - Continue carvedilol amlodipine & coreg. ACEI on hold due to soft bp    - Echocardiogram July 2023 showed normal LV function mild MR left atrial enlargement mild to moderate TR with a PA pressure 35.     CAD, s/p PCI of the RCA & diag w/  FAUZIA x 2 1/7/22  - No ischemic symptoms  - Resume baby aspirin when okay with GI  - On coreg, atorvastatin     HTN   - well controlled . ACEi on hold     HLD   - LDL 61, statin therapy     Plan:    CardioMEMs PAD today : 9 mmHg,  Goal 11-17  Continue to hold lasix at this time . Plan for f/u with [facility] on 8/26 AM as scheduled   Denies anginal sx. Continue coreg, atorvastatin, amlodipine  BP stable. Can resume ACEi on discharge   Resume ASA when ok with GI. Repeat H/H pending this afternoon. Ok to discharge from Cardiology standpoint if stable.     SHIN Mark  8/22/2024  1:18 PM  Ph [phone] ([place])  Ph [phone] ([place])

## 2024-08-22 NOTE — CDS QUERY
How to answer this Query:    1.) DON'T CLICK COSIGN BUTTON FIRST  2.) Click \"3 dots...\" to the right of cosign button and click EDIT on the toolbar.  2.) Type an \"X\" in the bracket for the diagnosis that applies. (You may also add additional clinical details as you feel necessary to substantiate your response).   3.) Finally click \"Sign\" to complete response.  Thank You    CLINICAL DOCUMENTATION CLARIFICATION  Dear Doctor:RAFFAELE  Can the corresponding diagnosis be specified that is associated with documented hgb 13.2-8.4?    ( X )  Acute Blood Loss Anemia    ( )  Other (please specify):         RISK FACTORS: HO DIVERTICULAR BLEED   CLINICAL INDICATORS:8/21 DR DANIELS-Diverticulosis: severe L sided diverticulosis with fresh blood to the splenic flexure. Cleaned blood and no active bleeding noted.   -5 BLOODY BMS  8/20 HGB 13.2  8/21 HGB 10.7  8/22 HGB=8.4   TREATMENT:MONITOR HGB Q6HRS, GI CONSULT, C-SCOPE    If you have any questions, please contact Clinical Documentation  Specialist:  Mayra PRAJAPATI RN at [phone]     Thank You!    THIS FORM IS A PERMANENT PART OF THE MEDICAL RECORD

## 2024-08-22 NOTE — PROGRESS NOTES
[facility]     Gastroenterology Progress Note    Rosita Lemus Patient Status:  Inpatient    1950 MRN [id number]   Location [facility]5W Attending Yahaira Patino MD   Hosp Day # 1 PCP Fernanda Tolbert MD       Subjective:   No bleeding overnight  A little more LE swelling  Feeling hungry and wants to eat    Objective:   Blood pressure 123/57, pulse 60, temperature 98.6 °F (37 °C), temperature source Oral, resp. rate 16, height 5' 9\" (1.753 m), weight 240 lb 3.2 oz (109 kg), SpO2 99%. Body mass index is 35.47 kg/m².    Gen- Patient appears comfortable and in no acute discomfort  HEENT: the sclera appears anicteric, oropharynx clear, mucus membranes appear moist  CV- regular rate and rhythm, the extremities are warm and well perfused   Lung- Moves air well; No labored breathing  Abdomen- soft, non-tender exam in all quadrants without rigidity or guarding, non-distended, no abnormal bowel sounds noted, no masses are palpated  Skin- No jaundice  Ext: no cyanosis, clubbing or edema is evident.   Neuro- Alert and interactive, and gross movements of extremities normal    Assessment and Plan:   73 year old female w/ PMHx of BMI 36.33, DM2, diverticulosis c/b diverticulitis, CKD III, CAD s/p PCI  (off plavix), HFpEF, HTN, HLD, LILY who presents to the ED with bloody stools. Colonoscopy with blood seen to the splenic flexure, likely L sided diverticular bleeding. Cleared out stool and blood and no further bleeding since    Ok to advance diet  Elective colonoscopy to removed polyp can be discussed outpatient  Monitor for lower GIB, if repeat bleeding consider NM bleeding scan or if fast bleeding with hypotension CT angio  Repeat Hgb again in 6 hours and if stable and no overt bleeding ok to go from Gi standpoint  Transfuse if Hgb <8    Vita Adams MD  [facility] Gastroenterology      Results:     Lab Results   Component Value Date    WBC 4.4 2024    HGB 8.4 (L)  08/22/2024    HCT 26.0 (L) 08/22/2024    .0 (L) 08/22/2024    CREATSERUM 0.82 08/22/2024    BUN 9 08/22/2024     (H) 08/22/2024    K 3.8 08/22/2024     (H) 08/22/2024    CO2 28.0 08/22/2024     (H) 08/22/2024    CA 8.8 08/22/2024    ALB 3.6 04/20/2022    ALB 4.00 04/20/2022    ALKPHO 88 04/20/2022    BILT 0.3 04/20/2022    TP 7.2 04/20/2022    TP 7.3 04/20/2022    AST 13 (L) 04/20/2022    ALT 18 04/20/2022    PTT 21.9 (L) 08/21/2024    INR 1.12 08/21/2024    T4F 1.0 12/05/2021    TSH 2.170 12/05/2021    ESRML 20 04/20/2022    CRP <0.29 04/20/2022    PHOS 3.3 04/20/2022    B12 692 10/14/2021       No results found.

## 2024-08-22 NOTE — DISCHARGE INSTRUCTIONS
Going Home Instructions    ** [facility] on: Monday 8/26/24 8:30 labs prior to appointment and 9AM with Valentina Nuñez in the [facility].  [phone] is the main number.    Do not resume your Aspirin until your CBC is complete and reviewed by [facility].    In this section you will find the tools which will guide you through the first few days after you leave the hospital. Continued use of these tools will help you develop the skills necessary to keep your heart failure under control.     Home Care Instructions Following Heart Failure - the most important things to do every day include:   Weigh yourself and review the “Self-Check Plan” sheet every morning.   Call your cardiologist office if you are in the “Pay Attention-Use Caution” (yellow zone) or “Medical Alert-Warning!” (red zone) as outlined in the Self-Check Plan sheet.  Take your medicines as prescribed.  Limit your sodium (salt) intake.  Know when to call your cardiologist, primary doctor, or nurse.  Know when to seek emergency care.      Things for You to Remember:   1. See your doctor or healthcare provider as written on your discharge instructions.  It is important that you attend this appointment to make sure your symptoms are under control.     2. Your recommended sodium intake is 4157-8706 mg daily.    3.  Weigh yourself every day.    4. Some exercise and activity is important to help keep your heart functioning and strong. Unless instructed not to exercise, you may walk at a slow to moderate pace for 10-15 minutes 2-3 days per week to start. Pace your activity to prevent shortness of breath or fatigue. Stop exercising if you develop chest pain, lightheadedness, or significant shortness of breath.       Call Your Cardiologist If:   You gain 2-3 pounds in one day or 5 pounds in one week.  You have more difficulty breathing.  You are getting more tired with normal activity.  You are more short of breath lying down,  or awaken at night short of breath.  You have swelling of your feet or legs.  You urinate less often during the day and more often at night.  You have cramps in your legs.  You have blurred vision or see yellowish-green halos around objects of lights.    Go to the Emergency Room If:   You have pain or tightness in your chest  You are extremely short of breath  You are coughing up pink-frothy mucus  You are traveling and develop symptoms of worsening heart failure

## 2024-08-22 NOTE — PROGRESS NOTES
PA Diastolic Pressure Goal: 14 mmHg, Lower 11 mmHg, Upper 17 mmHg  Cardiomems reading performed with good signal strength and waveform. PAD 9 Patient tolerated it well.

## 2024-08-22 NOTE — PLAN OF CARE
Patient discharged to home with daughter. To follow up with cardiac clinic on Monday to discussed resuming lasix, holding for now. Discussed low residue diet. Pt voiced no questions or concerns.     Problem: Patient Centered Care  Goal: Patient preferences are identified and integrated in the patient's plan of care  Description: Interventions:  - What would you like us to know as we care for you? From home  - Provide timely, complete, and accurate information to patient/family  - Incorporate patient and family knowledge, values, beliefs, and cultural backgrounds into the planning and delivery of care  - Encourage patient/family to participate in care and decision-making at the level they choose  - Honor patient and family perspectives and choices  8/22/2024 1832 by Chelsi Guevara RN  Outcome: Adequate for Discharge  8/22/2024 1625 by Chelsi Guevara RN  Outcome: Progressing     Problem: DISCHARGE PLANNING  Goal: Discharge to home or other facility with appropriate resources  Description: INTERVENTIONS:  - Identify barriers to discharge w/pt and caregiver  - Include patient/family/discharge partner in discharge planning  - Arrange for needed discharge resources and transportation as appropriate  - Identify discharge learning needs (meds, wound care, etc)  - Arrange for interpreters to assist at discharge as needed  - Consider post-discharge preferences of patient/family/discharge partner  - Complete POLST form as appropriate  - Assess patient's ability to be responsible for managing their own health  - Refer to Case Management Department for coordinating discharge planning if the patient needs post-hospital services based on physician/LIP order or complex needs related to functional status, cognitive ability or social support system  8/22/2024 1832 by Chelsi Guevara, RN  Outcome: Adequate for Discharge  8/22/2024 1625 by Chelsi Guevara, RN  Outcome: Progressing     Problem: GASTROINTESTINAL -  ADULT  Goal: Minimal or absence of nausea and vomiting  Description: INTERVENTIONS:  - Maintain adequate hydration with IV or PO as ordered and tolerated  - Nasogastric tube to low intermittent suction as ordered  - Evaluate effectiveness of ordered antiemetic medications  - Provide nonpharmacologic comfort measures as appropriate  - Advance diet as tolerated, if ordered  - Obtain nutritional consult as needed  - Evaluate fluid balance  8/22/2024 1832 by Chelsi Guevara RN  Outcome: Adequate for Discharge  8/22/2024 1625 by Chelsi Guevara RN  Outcome: Progressing  Goal: Maintains or returns to baseline bowel function  Description: INTERVENTIONS:  - Assess bowel function  - Maintain adequate hydration with IV or PO as ordered and tolerated  - Evaluate effectiveness of GI medications  - Encourage mobilization and activity  - Obtain nutritional consult as needed  - Establish a toileting routine/schedule  - Consider collaborating with pharmacy to review patient's medication profile  8/22/2024 1832 by Chelsi Guevara RN  Outcome: Adequate for Discharge  8/22/2024 1625 by Chelsi Guevara RN  Outcome: Progressing  Goal: Maintains adequate nutritional intake (undernourished)  Description: INTERVENTIONS:  - Monitor percentage of each meal consumed  - Identify factors contributing to decreased intake, treat as appropriate  - Assist with meals as needed  - Monitor I&O, WT and lab values  - Obtain nutritional consult as needed  - Optimize oral hygiene and moisture  - Encourage food from home; allow for food preferences  - Enhance eating environment  8/22/2024 1832 by Chelsi Guevara RN  Outcome: Adequate for Discharge  8/22/2024 1625 by Chelsi Guevara RN  Outcome: Progressing     Problem: Patient/Family Goals  Goal: Patient/Family Long Term Goal  Description: Patient's Long Term Goal: discharge    Interventions:  - - Monitor vitals  - Monitor appropriate labs  - Pain management  - Follow MD  order  - Diagnostics per order  - Update/Informing patient and family on plan of care  - Discharge planning  - See additional Care Plan goals for specific interventions  8/22/2024 1832 by Chelsi Guevara RN  Outcome: Adequate for Discharge  8/22/2024 1625 by Chelsi Guevara RN  Outcome: Progressing  Goal: Patient/Family Short Term Goal  Description: Patient's Short Term Goal: feel better    Interventions:   - - Monitor vitals  - Monitor appropriate labs  - Pain management  - Follow MD order  - Diagnostics per order  - Update/Informing patient and family on plan of care  - Discharge planning  - See additional Care Plan goals for specific interventions  8/22/2024 1832 by Chelsi Guevara RN  Outcome: Adequate for Discharge  8/22/2024 1625 by Chelsi Guevara RN  Outcome: Progressing     Problem: CARDIOVASCULAR - ADULT  Goal: Maintains optimal cardiac output and hemodynamic stability  Description: INTERVENTIONS:  - Monitor vital signs, rhythm, and trends  - Monitor for bleeding, hypotension and signs of decreased cardiac output  - Evaluate effectiveness of vasoactive medications to optimize hemodynamic stability  - Monitor arterial and/or venous puncture sites for bleeding and/or hematoma  - Assess quality of pulses, skin color and temperature  - Assess for signs of decreased coronary artery perfusion - ex. Angina  - Evaluate fluid balance, assess for edema, trend weights  8/22/2024 1832 by Chelsi Guevara RN  Outcome: Adequate for Discharge  8/22/2024 1625 by Chelsi Guevara RN  Outcome: Progressing  Goal: Absence of cardiac arrhythmias or at baseline  Description: INTERVENTIONS:  - Continuous cardiac monitoring, monitor vital signs, obtain 12 lead EKG if indicated  - Evaluate effectiveness of antiarrhythmic and heart rate control medications as ordered  - Initiate emergency measures for life threatening arrhythmias  - Monitor electrolytes and administer replacement therapy as  ordered  8/22/2024 1832 by Chelsi Guevara, RN  Outcome: Adequate for Discharge  8/22/2024 1625 by Chelsi Guevara, RN  Outcome: Progressing

## 2024-08-22 NOTE — PLAN OF CARE
Problem: Patient Centered Care  Goal: Patient preferences are identified and integrated in the patient's plan of care  Description: Interventions:  - What would you like us to know as we care for you? From home  - Provide timely, complete, and accurate information to patient/family  - Incorporate patient and family knowledge, values, beliefs, and cultural backgrounds into the planning and delivery of care  - Encourage patient/family to participate in care and decision-making at the level they choose  - Honor patient and family perspectives and choices  Outcome: Progressing     Problem: DISCHARGE PLANNING  Goal: Discharge to home or other facility with appropriate resources  Description: INTERVENTIONS:  - Identify barriers to discharge w/pt and caregiver  - Include patient/family/discharge partner in discharge planning  - Arrange for needed discharge resources and transportation as appropriate  - Identify discharge learning needs (meds, wound care, etc)  - Arrange for interpreters to assist at discharge as needed  - Consider post-discharge preferences of patient/family/discharge partner  - Complete POLST form as appropriate  - Assess patient's ability to be responsible for managing their own health  - Refer to Case Management Department for coordinating discharge planning if the patient needs post-hospital services based on physician/LIP order or complex needs related to functional status, cognitive ability or social support system  Outcome: Progressing     Problem: GASTROINTESTINAL - ADULT  Goal: Minimal or absence of nausea and vomiting  Description: INTERVENTIONS:  - Maintain adequate hydration with IV or PO as ordered and tolerated  - Nasogastric tube to low intermittent suction as ordered  - Evaluate effectiveness of ordered antiemetic medications  - Provide nonpharmacologic comfort measures as appropriate  - Advance diet as tolerated, if ordered  - Obtain nutritional consult as needed  - Evaluate fluid  balance  Outcome: Progressing  Goal: Maintains or returns to baseline bowel function  Description: INTERVENTIONS:  - Assess bowel function  - Maintain adequate hydration with IV or PO as ordered and tolerated  - Evaluate effectiveness of GI medications  - Encourage mobilization and activity  - Obtain nutritional consult as needed  - Establish a toileting routine/schedule  - Consider collaborating with pharmacy to review patient's medication profile  Outcome: Progressing  Goal: Maintains adequate nutritional intake (undernourished)  Description: INTERVENTIONS:  - Monitor percentage of each meal consumed  - Identify factors contributing to decreased intake, treat as appropriate  - Assist with meals as needed  - Monitor I&O, WT and lab values  - Obtain nutritional consult as needed  - Optimize oral hygiene and moisture  - Encourage food from home; allow for food preferences  - Enhance eating environment  Outcome: Progressing     Problem: Patient/Family Goals  Goal: Patient/Family Long Term Goal  Description: Patient's Long Term Goal: discharge    Interventions:  - - Monitor vitals  - Monitor appropriate labs  - Pain management  - Follow MD order  - Diagnostics per order  - Update/Informing patient and family on plan of care  - Discharge planning  - See additional Care Plan goals for specific interventions  Outcome: Progressing  Goal: Patient/Family Short Term Goal  Description: Patient's Short Term Goal: feel better    Interventions:   - - Monitor vitals  - Monitor appropriate labs  - Pain management  - Follow MD order  - Diagnostics per order  - Update/Informing patient and family on plan of care  - Discharge planning  - See additional Care Plan goals for specific interventions  Outcome: Progressing     Problem: CARDIOVASCULAR - ADULT  Goal: Maintains optimal cardiac output and hemodynamic stability  Description: INTERVENTIONS:  - Monitor vital signs, rhythm, and trends  - Monitor for bleeding, hypotension and signs  of decreased cardiac output  - Evaluate effectiveness of vasoactive medications to optimize hemodynamic stability  - Monitor arterial and/or venous puncture sites for bleeding and/or hematoma  - Assess quality of pulses, skin color and temperature  - Assess for signs of decreased coronary artery perfusion - ex. Angina  - Evaluate fluid balance, assess for edema, trend weights  Outcome: Progressing  Goal: Absence of cardiac arrhythmias or at baseline  Description: INTERVENTIONS:  - Continuous cardiac monitoring, monitor vital signs, obtain 12 lead EKG if indicated  - Evaluate effectiveness of antiarrhythmic and heart rate control medications as ordered  - Initiate emergency measures for life threatening arrhythmias  - Monitor electrolytes and administer replacement therapy as ordered  Outcome: Progressing

## 2024-08-22 NOTE — PAYOR COMM NOTE
--------------  STATUS CHANGED TO OBSERVATION 8/22    Order Requisition    Place in observation Once (Order #[id number]) on 8/22/24       ADMISSION REVIEW     Payor: [employer]  Subscriber #:  [id number]  Authorization Number: [id number]    Admit date: 8/21/24  Admit time: 11:02 AM  DC DATE 8/22         Patient Seen in: [facility] Emergency Department    History     Chief Complaint   Patient presents with    Rectal Bleeding     73-year-old female history of CAD status post PCI 12/2022, HFpEF, CKD, hypertension hyperlipidemia, previous severe diverticular hemorrhage presenting with bright red blood per rectum.  Onset symptoms morning.  4 episodes of bright red blood mixed in with stool.  No abdominal pain.  No hemoptysis or hematemesis.  On aspirin -no longer on Plavix.    Objective:   Past Medical History:    Diabetes (HCC)    Diabetes mellitus (HCC)    Diverticulitis    Essential hypertension    High blood pressure    High cholesterol    Hyperlipidemia    Sleep apnea     Past Surgical History:   Procedure Laterality Date    Angiogram N/A 01/07/2022    Cataract extraction w/  intraocular lens implant Right 2017    Dr. Griffin    Colonoscopy N/A 6/5/2020    Procedure: COLONOSCOPY;  Surgeon: Ye Ling MD;  Location: [facility] ENDOSCOPY    Colonoscopy screening - referral N/A 12/27/2022    Procedure: COLONOSCOPY-SCREENING;  Surgeon: Ye Ling MD;  Location: [facility] ENDOSCOPY    Hysterectomy      Vit for macular hole Right 2016    surgery for macular hole in 2016- Dr. Ross?    Yag capsulotomy - od - right eye Right 2017    Dr. Griffin     Physical Exam     ED Triage Vitals [08/21/24 0733]   /64   Pulse 80   Resp 20   Temp 98.2 °F (36.8 °C)   Temp src Oral   SpO2 96 %   O2 Device None (Room air)     Current Vitals:   Vital Signs  BP: 123/71  Pulse: 67  Resp: 18  Temp: 98.4 °F (36.9 °C)  Temp src: Oral  MAP (mmHg): 85    Physical Exam      Constitutional: awake, alert, no sig  distress  HENT: mmm, no lesions,  Neck: normal range of motion, no tenderness, supple.  Eyes: PERRL, EOMI, conjunctiva normal, no discharge. Sclera anicteric.  Cardiovascular: rr no murmur  Respiratory: Normal breath sounds, no respiratory distress, no wheezing, no chest tenderness.  GI: Bowel sounds normal, Soft, no tenderness, no masses, no pulsatile masses.  -There was blood present on LORENZO  : No CVA tenderness.  Skin: Warm, dry, no erythema, no rash.    Labs Reviewed   BASIC METABOLIC PANEL (8) - Abnormal; Notable for the following components:       Result Value    Glucose 177 (*)     Chloride 114 (*)     Calculated Osmolality 301 (*)     All other components within normal limits   CBC WITH DIFFERENTIAL WITH PLATELET - Abnormal; Notable for the following components:    RBC 3.66 (*)     HGB 10.7 (*)     HCT 33.8 (*)     RDW-SD 52.7 (*)     RDW 15.5 (*)     All other components within normal limits   PTT, ACTIVATED - Abnormal; Notable for the following components:    PTT 21.9 (*)     All other components within normal limits   PROTHROMBIN TIME (PT) - Abnormal; Notable for the following components:    PT 15.1 (*)     All other components within normal limits   HEMOGLOBIN A1C      MDM      73F hx as above, p/w BRBPR. On arrival vss, reassuring. Not actively bleeding  High clinical suspicion for diverticular hemorrhage, considered bleeding polyp, or internal hemorrhoid. Doubt brisk UGIB. No hx cirrhosis or varices.    Hemoglobin down about 2 g from prior yesterday.  Again suspicion is for diverticular hemorrhage.  Not actively bleeding not hypotensive in the ER so I do not think would benefit from CTA at this time.  Will admit discussed with hospitalist and gastroenterology    Disposition and Plan     Clinical Impression:  1. BRBPR (bright red blood per rectum)          GI:      History of Present Illness:  Rosita Lemus is a 73 year old female w/ PMHx of BMI 36.33, DM2, diverticulosis c/b diverticulitis, CAD s/p  PCI 12/2024, HFpEF, HTN, HLD, LILY who presents to the ED with bloody stools.     Pt states she woke early this am with urge to defecate associated with bloating.  She had 5-6 episodes of BRB with brown/mushy stool.  After her last BM, she felt clammy, dizzy and weak so came to the ED.  She denies ABD pain, N/V, dyspepsia, black/maroon stools, constipation/diarrhea, unintentional weight loss, chest pain/SOB and NSAID use.  No blood thinners.  Has not eaten yet today.     Pertinent Family Hx:  - No known history of esophageal or gastric CA. Father Dx colon cancer in his 60's  - No known IBD  - No known liver pathologies     Endoscopy Hx:  - Colonoscopy 12/2022:               Tattoo and polypectomy scar encountered in the cecum as per previous history, carefully examined with no recurrence identified.  Photograph taken.  Moderate severity diverticulosis of the ascending colon; severe clonic diverticulosis descending and sigmoid colon.  Large redundant internal hemorrhoids with red marks.     Social Hx:  - No tobacco use/No ETOH  - Denies cannabis/illicit drug use  - Lives with daughter  - Occupation: Retired CTA       Component Value Date     WBC 5.5 08/21/2024     HGB 10.7 08/21/2024     HCT 33.8 08/21/2024     .0 08/21/2024     CREATSERUM 0.97 08/21/2024     BUN 15 08/21/2024      08/21/2024     K 4.2 08/21/2024      08/21/2024     CO2 25.0 08/21/2024      08/21/2024     CA 9.0 08/21/2024     PTT 21.9 08/21/2024     INR 1.12 08/21/2024         Imaging:  No results found.     Assessment & Plan   Rosita Lemus is a 73 year old female w/ PMHx of BMI 36.33, DM2, diverticulosis c/b diverticulitis, CKD III, CAD s/p PCI 2022 (off plavix), HFpEF, HTN, HLD, LILY who presents to the ED with bloody stools.     #Hematochezia  -Labs on admission with INR 1.12, Hgb 10.7 with baseline 11-12  -Last colonoscopy 2022 with diverticulosis and large internal hemorrhoids  -Etiology possible diverticular  bleed vs hemorrhoidal vs AVM, recommend colonoscopy to further evaluate.  Risks/benefits of procedure discussed with Pt who states understanding and is willing to proceed.  -Follows with cardiology for CAD and HF.  Recent Eccho with preserved EF, on lasix     Colonoscopy consent: I have discussed the risks, benefits, and alternatives to colonoscopy with the patient/primary decision maker [who demonstrated understanding], including but not limited to the risks of bleeding, infection, pain, death, as well as the risks of anesthesia and perforation all leading to prolonged hospitalization, surgical intervention, or even death. I also specifically mentioned the miss rate of colonoscopy of 5-10% in the best of all circumstances. The patient has agreed to sign an informed consent and elected to proceed with colonoscopy with possible intervention [i.e. polypectomy, stent placement, etc.] as indicated.     Recommend:  -Plenvu now  -Otherwise NPO  -Trend Hgb   -Transfuse to if hgb <8  -Monitor for overt GIB        History and Physical       ASSESSMENT / PLAN:      Ms. Lemus is a 72 yo F with PMH of HFpEF with cardiomems, CAD, DM2, HTN, HL, hx diverticulitis who presented with BRBPR.      BPBPR  Hx diverticulitis   - started this AM, 5 bloody BMs  - Hg 10.7 on admit, down from 13 from routine labs yesterday  - GI consult  - NPO for now  - trend Hg Q6 hr  - gentle IVF given hx of CHF     HFpEF  HTN  - follows with [facility]- will consult  - has cardiomems and follows at HF clinic  - hold lasix for now, restart when able     CAD  HL  - hold ASA  - continue crestor  - denies cardiac symptoms     DM2  - home regimen: metformin 500 mg daily, jardiance 10 mg daily, Januvia 50 mg daily-hold  - accuchecks QID, hypoglycemic protocol, SSI     FN:  - IVF: NS 75cc/hr  - Diet: NPO sips of clears     DVT Prophy: SCD  Lines: PIV      History of Present Illness:      Ms. Lemus is a 72 yo F with PMH of HFpEF with cardiomems, CAD, DM2, HTN,  HL, hx diverticulitis who presented with BRBPR. Patient states symptoms started this morning. Has had 5-6 bloody BMs. Denies dizziness, no CP, SOB. Denies abdominal pain. Has had similar issues in the past, due to diverticulosis, only 1 other hospitalization. Does take ASA, no other blood thinners. Last colonoscopy 2-3 years ago.      In the ED, vitals stable. Labs with Hg 10.7, had routine labs yesterday with Hg 13. GI consulted.       GI:    Procedure: Colonoscopy w/MAC     Pre-operative diagnosis: hematochezia     Post-operative diagnosis: severe L sided diverticulosis with blood to the splenic flexure, minimal R sided diverticulosis, hemorrhoids, ascending AVM, cecal polyp not removed       Procedure:  Informed consent was obtained from the patient after the risks of the procedure were discussed, including but not limited to bleeding, perforation, aspiration, infection, or possibility of a missed lesion. After discussions of the risks/benefits and alternatives to this procedure, as well as the planned sedation, the patient was placed in the left lateral decubitus position and begun on continuous blood pressure pulse oximetry and EKG monitoring and this was maintained throughout the procedure. Once an adequate level of sedation was obtained a digital rectal exam was completed. Then the lubricated tip of the [id number] diagnostic video colonoscope was inserted and advanced without difficulty to the cecum using the CO2 insufflation technique. The cecum was identified by localizing the trifold, the appendix and the ileocecal valve. Withdrawal was begun with thorough washing and careful examination of the colonic walls and folds. A routine second examination of the cecum/ascending colon was performed. Photodocumentation was obtained. The bowel prep was good. Views of the colon were good with washing. I then carefully withdrew the instrument from the patient who tolerated the procedure well.      Complications:  none.     Findings:   1. 1 polyp(s) noted 4 mm polyp in the cecum not removed given indication for exam     2. Diverticulosis: severe L sided diverticulosis with fresh blood to the splenic flexure. Cleaned blood and no active bleeding noted. .     3. Terminal ileum: the visualized mucosa appeared normal.     4. A retroflexed view of the rectum revealed hemorrhoids.     5. The colonic mucosa throughout the colon showed normal vascular pattern. 5 mm Avm in the ascending colon not bleeding     6. LORENZO: normal rectal tone, no masses palpated.      Impression:   Likely L sided diverticular bleed     Recommend:  Elective colonoscopy to removed polyp can be discussed outpatient  Monitor for lower GIB, if repeat bleeding consider NM bleeding scan or if fast bleeding with hypotension CT angio  Ok to do CLD today  H/H Q12  Transfuse if symptomatic or if Hgb <8     MEDICATIONS ADMINISTERED IN LAST 1 DAY:  carvedilol (Coreg) tab 25 mg       Date Action Dose Route User    8/22/2024 0825 Given 25 mg Oral Chelsi Guevara RN       lactated ringers infusion       Date Action Dose Route User    8/21/2024 1435 New Bag (none) Intravenous Angela Hernandez CRNA          lactated ringers infusion       Date Action Dose Route User    8/21/2024 1530 New Bag (none) Intravenous Chelsi Guevara RN          lidocaine PF (Xylocaine-MPF) 1% injection       Date Action Dose Route User    8/21/2024 1440 Given 25 mg Intravenous Angela Hernandez CRNA       sodium chloride 0.9% infusion       Date Action Dose Route User    8/21/2024 1344 New Bag (none) Intravenous Chelsi Guevara RN            Vitals (last day)       Date/Time Temp Pulse Resp BP SpO2 Weight O2 Device O2 Flow Rate (L/min) Who    08/22/24 0737 98.6 °F (37 °C) 60 16 123/57 99 % -- None (Room air) 0 L/min AK    08/22/24 0541 -- -- -- -- -- 240 lb 3.2 oz (109 kg) -- -- BA    08/22/24 0514 98.4 °F (36.9 °C) 60 17 142/56 97 % -- None (Room air) --     08/22/24 0027  98.3 °F (36.8 °C) 55 18 132/54 96 % -- None (Room air) --     08/21/24 2033 97.7 °F (36.5 °C) 55 19 133/53 95 % -- None (Room air) --     08/21/24 1700 -- 62 -- -- -- -- -- -- ND    08/21/24 1655 98.1 °F (36.7 °C) 62 20 142/81 96 % -- None (Room air) -- ND    08/21/24 1540 -- 61 19 -- -- -- -- --     08/21/24 1535 -- 67 17 121/64 -- -- None (Room air) --     08/21/24 1530 -- 61 21 108/59 87 % -- None (Room air) --     08/21/24 1525 -- 63 18 117/62 96 % -- -- --     08/21/24 1520 -- 70 19 105/60 98 % -- None (Room air) --     08/21/24 1420 -- 70 22 131/71 99 % -- None (Room air) --     08/21/24 1418 -- 69 20 131/71 95 % 246 lb (111.6 kg) -- --     08/21/24 1405 -- 72 20 112/66 100 % -- None (Room air) -- ND    08/21/24 1304 -- -- -- -- -- 246 lb (111.6 kg) -- -- ND    08/21/24 1104 98.4 °F (36.9 °C) 67 18 123/71 100 % -- None (Room air) -- ND    08/21/24 1000 -- 64 20 122/68 99 % -- None (Room air) --     08/21/24 0945 -- 66 15 138/68 99 % -- None (Room air) --     08/21/24 0904 -- 62 20 90/76 99 % -- None (Room air) --     08/21/24 0733 98.2 °F (36.8 °C) 80 20 119/64 96 % 246 lb (111.6 kg) None (Room air) -- RR

## 2024-08-22 NOTE — PROGRESS NOTES
[facility] Progress Note     CC: Hospital Follow up    PCP: Fernanda Tolbert MD       Assessment/Plan:     Principal Problem:    BRBPR (bright red blood per rectum)      Ms. Lemus is a 72 yo F with PMH of HFpEF with cardiomems, CAD, DM2, HTN, HL, hx diverticulitis who presented with BRBPR.      BPBPR  Hx diverticulitis   - started this AM, 5 bloody BMs  - Hg 10.7 on admit, down from 13 from routine labs yesterday  - GI consult, s/p colonoscopy 8/21 without active bleeding  - repeat Hg this afternoon     HFpEF  HTN  - follows with [facility]- will consult  - has cardiomems and follows at HF clinic  - hold lasix for now, restart when able     CAD  HL  - hold ASA  - continue crestor  - denies cardiac symptoms     DM2  - home regimen: metformin 500 mg daily, jardiance 10 mg daily, Januvia 50 mg daily-hold  - accuchecks QID, hypoglycemic protocol, SSI     FN:  - IVF: none  - Diet: diabetic/cardiac     DVT Prophy: SCD  Lines: PIV     Dispo: pending clinical course, possible home later today vs. Tomorrow pending Hg     Outpatient records or previous hospital records reviewed.      Further recommendations pending patient's clinical course.  [facility] hospitalist to continue to follow patient while in house     Patient and/or patient's family given opportunity to ask questions and note understanding and agreeing with therapeutic plan as outlined     Yahaira Patino MD  [facility] Hospitalist  Answering Service number: [phone]     Subjective:     Colonoscopy yesterday without active bleeding. Hg down to 8.3 today, normal brown stool this AM, had a lot of blood with bowel prep yesterday though.     OBJECTIVE:    Blood pressure 96/48, pulse 63, temperature 98.4 °F (36.9 °C), temperature source Oral, resp. rate 18, height 5' 9\" (1.753 m), weight 240 lb 3.2 oz (109 kg), SpO2 99%.    Temp:  [97.7 °F (36.5 °C)-98.6 °F (37 °C)] 98.4 °F (36.9 °C)  Pulse:  [55-72] 63  Resp:  [16-22] 18  BP: ()/(48-81) 96/48  SpO2:  [87 %-100 %] 99  %      Intake/Output:    Intake/Output Summary (Last 24 hours) at 8/22/2024 1309  Last data filed at 8/21/2024 2300  Gross per 24 hour   Intake 1056.25 ml   Output --   Net 1056.25 ml       Last 3 Weights   08/22/24 0541 240 lb 3.2 oz (109 kg)   08/21/24 1418 246 lb (111.6 kg)   08/21/24 1304 246 lb (111.6 kg)   08/21/24 0733 246 lb (111.6 kg)   07/16/24 1549 242 lb (109.8 kg)   07/16/24 0905 242 lb (109.8 kg)       Exam   GEN: female in NAD, awake and alert  HEENT: EOMI  Pulm: CTAB, no crackles or wheezes  CV: RRR, no murmurs  ABD: Soft, non-tender, mildly distended, +BS  SKIN: warm, dry  EXT: 1+ pitting edema R slightly > L      Data Review:       Labs:     Recent Labs   Lab 08/21/24  0858 08/21/24  1406 08/22/24  0908   RBC 3.66*  --  2.85*   HGB 10.7* 11.4* 8.4*   HCT 33.8* 36.3 26.0*   MCV 92.3  --  91.2   MCH 29.2  --  29.5   MCHC 31.7  --  32.3   RDW 15.5*  --  16.0*   NEPRELIM 3.58  --   --    WBC 5.5  --  4.4   .0  --  144.0*         Recent Labs   Lab 08/21/24  0858 08/22/24  0552   * 126*   BUN 15 9   CREATSERUM 0.97 0.82   EGFRCR 62 76   CA 9.0 8.8    146*   K 4.2 3.8   * 113*   CO2 25.0 28.0       No results for input(s): \"ALT\", \"AST\", \"ALB\", \"AMYLASE\", \"LIPASE\", \"LDH\" in the last 168 hours.    Invalid input(s): \"ALPHOS\", \"TBIL\", \"DBIL\", \"TPROT\"      Imaging:  No results found.      Meds:     INPATIENT MEDICATIONS    Scheduled Medications:      carvedilol, 25 mg, BID with meals  amLODIPine, 10 mg, Nightly  insulin aspart, 1-5 Units, TID CC  rosuvastatin, 40 mg, Daily            Drips:       PRN Medications  glucose, 15 g, Q15 Min PRN   Or  glucose, 15 g, Q15 Min PRN   Or  glucose-vitamin C, 4 tablet, Q15 Min PRN   Or  dextrose, 50 mL, Q15 Min PRN   Or  glucose, 30 g, Q15 Min PRN   Or  glucose, 30 g, Q15 Min PRN   Or  glucose-vitamin C, 8 tablet, Q15 Min PRN  acetaminophen, 500 mg, Q4H PRN  ondansetron, 4 mg, Q6H PRN  metoclopramide, 10 mg, Q8H PRN

## 2024-08-22 NOTE — PLAN OF CARE
Problem: GASTROINTESTINAL - ADULT  Goal: Minimal or absence of nausea and vomiting  Description: INTERVENTIONS:  - Maintain adequate hydration with IV or PO as ordered and tolerated  - Nasogastric tube to low intermittent suction as ordered  - Evaluate effectiveness of ordered antiemetic medications  - Provide nonpharmacologic comfort measures as appropriate  - Advance diet as tolerated, if ordered  - Obtain nutritional consult as needed  - Evaluate fluid balance  Outcome: Progressing  Goal: Maintains or returns to baseline bowel function  Description: INTERVENTIONS:  - Assess bowel function  - Maintain adequate hydration with IV or PO as ordered and tolerated  - Evaluate effectiveness of GI medications  - Encourage mobilization and activity  - Obtain nutritional consult as needed  - Establish a toileting routine/schedule  - Consider collaborating with pharmacy to review patient's medication profile  Outcome: Progressing  Goal: Maintains adequate nutritional intake (undernourished)  Description: INTERVENTIONS:  - Monitor percentage of each meal consumed  - Identify factors contributing to decreased intake, treat as appropriate  - Assist with meals as needed  - Monitor I&O, WT and lab values  - Obtain nutritional consult as needed  - Optimize oral hygiene and moisture  - Encourage food from home; allow for food preferences  - Enhance eating environment  Outcome: Progressing

## 2024-08-22 NOTE — PLAN OF CARE
Patient alert and oriented. Tele. Call light within reach. Frequent rounding by staff.  Problem: Patient Centered Care  Goal: Patient preferences are identified and integrated in the patient's plan of care  Description: Interventions:  - What would you like us to know as we care for you? From home  - Provide timely, complete, and accurate information to patient/family  - Incorporate patient and family knowledge, values, beliefs, and cultural backgrounds into the planning and delivery of care  - Encourage patient/family to participate in care and decision-making at the level they choose  - Honor patient and family perspectives and choices  Outcome: Progressing     Problem: GASTROINTESTINAL - ADULT  Goal: Minimal or absence of nausea and vomiting  Description: INTERVENTIONS:  - Maintain adequate hydration with IV or PO as ordered and tolerated  - Nasogastric tube to low intermittent suction as ordered  - Evaluate effectiveness of ordered antiemetic medications  - Provide nonpharmacologic comfort measures as appropriate  - Advance diet as tolerated, if ordered  - Obtain nutritional consult as needed  - Evaluate fluid balance  Outcome: Progressing  Goal: Maintains or returns to baseline bowel function  Description: INTERVENTIONS:  - Assess bowel function  - Maintain adequate hydration with IV or PO as ordered and tolerated  - Evaluate effectiveness of GI medications  - Encourage mobilization and activity  - Obtain nutritional consult as needed  - Establish a toileting routine/schedule  - Consider collaborating with pharmacy to review patient's medication profile  Outcome: Progressing  Goal: Maintains adequate nutritional intake (undernourished)  Description: INTERVENTIONS:  - Monitor percentage of each meal consumed  - Identify factors contributing to decreased intake, treat as appropriate  - Assist with meals as needed  - Monitor I&O, WT and lab values  - Obtain nutritional consult as needed  - Optimize oral hygiene  and moisture  - Encourage food from home; allow for food preferences  - Enhance eating environment  Outcome: Progressing     Problem: Patient/Family Goals  Goal: Patient/Family Long Term Goal  Description: Patient's Long Term Goal: discharge    Interventions:  - - Monitor vitals  - Monitor appropriate labs  - Pain management  - Follow MD order  - Diagnostics per order  - Update/Informing patient and family on plan of care  - Discharge planning  - See additional Care Plan goals for specific interventions  Outcome: Progressing  Goal: Patient/Family Short Term Goal  Description: Patient's Short Term Goal: feel better    Interventions:   - - Monitor vitals  - Monitor appropriate labs  - Pain management  - Follow MD order  - Diagnostics per order  - Update/Informing patient and family on plan of care  - Discharge planning  - See additional Care Plan goals for specific interventions  Outcome: Progressing

## 2024-08-23 ENCOUNTER — TELEPHONE (OUTPATIENT)
Facility: CLINIC | Age: 74
End: 2024-08-23

## 2024-08-23 NOTE — DISCHARGE SUMMARY
General Medicine Discharge Summary     Patient ID:  Rosita Lemus  73 year old  11/27/1950    Admit date: 8/21/2024    Discharge date and time: 08/22/24    Attending Physician: No att. providers found     Primary Care Physician: Fernanda Tolbert MD     Reason for admission: BRBPR    Discharge Diagnoses: BRBPR (bright red blood per rectum) [K62.5]    Discharged Condition: stable    Disposition: home    Consults:   Consultants         Provider   Role Specialty     Naresh Saldana MD      Consulting Physician Interventional, Cardiology     Андрей Little MD      Consulting Physician Interventional, Cardiology     Vita Adams MD      Consulting Physician GASTROENTEROLOGY              HPI: Ms. Lemus is a 72 yo F with PMH of HFpEF with cardiomems, CAD, DM2, HTN, HL, hx diverticulitis who presented with BRBPR. Patient states symptoms started this morning. Has had 5-6 bloody BMs. Denies dizziness, no CP, SOB. Denies abdominal pain. Has had similar issues in the past, due to diverticulosis, only 1 other hospitalization. Does take ASA, no other blood thinners. Last colonoscopy 2-3 years ago.      In the ED, vitals stable. Labs with Hg 10.7, had routine labs yesterday with Hg 13. GI consulted.     Hospital Course:       Ms. Lemus is a 72 yo F with PMH of HFpEF with cardiomems, CAD, DM2, HTN, HL, hx diverticulitis who presented with BRBPR. Hg 10.7 from 13 day prior. GI consulted, s/p colonoscopy without active bleeding, Hg down to 8.3 but stable on repeat. OK to restart ASA per GI, stable for discharge home with close f/u. Coordinator texted for PCP f.u, also has CHF clinic follow as well.      BPBPR  Hx diverticulitis   - started this AM, 5 bloody BMs  - Hg 10.7 on admit, down from 13 from routine labs yesterday  - GI consult, s/p colonoscopy 8/21 without active bleeding  - repeat Hg this afternoon     HFpEF  HTN  - follows with [facility]- will consult  - has cardiomems and follows at HF clinic  - hold lasix for now,  restart when able     CAD  HL  - hold ASA, ok to restart today per GI  - continue crestor  - denies cardiac symptoms     DM2  - home regimen: metformin 500 mg daily, jardiance 10 mg daily, Januvia 50 mg daily-hold  - accuchecks QID, hypoglycemic protocol, SSI    Exam   GEN: female in NAD, awake and alert  HEENT: EOMI  Pulm: CTAB, no crackles or wheezes  CV: RRR, no murmurs  ABD: Soft, non-tender, mildly distended, +BS  SKIN: warm, dry  EXT: 1+ pitting edema R slightly > L       Operative Procedures: Procedure(s) (LRB):  COLONOSCOPY (N/A)     Imaging: No results found.        Home Medication Changes:     I reconciled current and discharge medications on day of discharge. These medication changes have been made as below         Medication List        CHANGE how you take these medications      aspirin 81 MG Tbec  Take 1 tablet (81 mg total) by mouth daily. Do not take until cleared by HF clinic when CBC is reviewed on Monday 8/26  Start taking on: August 27, 2024  What changed:   additional instructions  These instructions start on August 27, 2024. If you are unsure what to do until then, ask your doctor or other care provider.            CONTINUE taking these medications      Accu-Chek Kamla Plus Strp     Accu-Chek Softclix Lancets Misc  TEST BLOOD GLUCOSE ONCE DAILY     amLODIPine 10 MG Tabs  Commonly known as: Norvasc     carvedilol 25 MG Tabs  Commonly known as: Coreg  TAKE ONE TABLET BY MOUTH TWICE A DAY WITH MEALS     empagliflozin 10 MG Tabs  Commonly known as: Jardiance     famotidine 10 MG Tabs  Commonly known as: Pepcid     Medical Compression Stockings Misc  1 each daily. Knee high 15-20 mmhg wear during the day off at night     metFORMIN 500 MG Tabs  Commonly known as: Glucophage     rosuvastatin 40 MG Tabs  Commonly known as: Crestor  TAKE ONE TABLET BY MOUTH DAILY     SITagliptin Phosphate 50 MG Tabs  Commonly known as: Januvia  Take 1 tablet (50 mg total) by mouth daily.            STOP taking these  medications      furosemide 20 MG Tabs  Commonly known as: Lasix     lisinopril 40 MG Tabs  Commonly known as: Prinivil; Zestril               Where to Get Your Medications        These medications were sent to [id number] - [place], [place] - [address] [phone], [phone]  [address] [facility], [facility] [address]      Phone: [phone]   aspirin 81 MG Tbec         Activity: activity as tolerated  Diet: cardiac diet  Wound Care: none needed  Code Status: Full Code  O2: n/a    Follow-up with:    PCP   Specialist GI       FU   Follow-up Information       Fernanda Tolbert MD. Schedule an appointment as soon as possible for a visit in 1 week(s).    Specialty: Internal Medicine  Contact information:  [address]  [facility] [id number] [phone]               Vita Adams MD. Schedule an appointment as soon as possible for a visit in 4 week(s).    Specialty: GASTROENTEROLOGY  Contact information:  [address]  [facility] [id number] [phone]                             DC instructions:      Other Discharge Instructions:         Going Home Instructions    ** Please follow up with the [facility] on: Monday 8/26/24 8:30 labs prior to appointment and 9AM with Valentina Nuñez in the [facility].  [phone] is the main number.    Do not resume your Aspirin until your CBC is complete and reviewed by APN.    In this section you will find the tools which will guide you through the first few days after you leave the hospital. Continued use of these tools will help you develop the skills necessary to keep your heart failure under control.     Home Care Instructions Following Heart Failure - the most important things to do every day include:   Weigh yourself and review the “Self-Check Plan” sheet every morning.   Call your cardiologist office if you are in the “Pay Attention-Use Caution” (yellow zone) or “Medical Alert-Warning!” (red zone) as outlined in the Self-Check Plan sheet.  Take your  medicines as prescribed.  Limit your sodium (salt) intake.  Know when to call your cardiologist, primary doctor, or nurse.  Know when to seek emergency care.      Things for You to Remember:   1. See your doctor or healthcare provider as written on your discharge instructions.  It is important that you attend this appointment to make sure your symptoms are under control.     2. Your recommended sodium intake is 3568-3418 mg daily.    3.  Weigh yourself every day.    4. Some exercise and activity is important to help keep your heart functioning and strong. Unless instructed not to exercise, you may walk at a slow to moderate pace for 10-15 minutes 2-3 days per week to start. Pace your activity to prevent shortness of breath or fatigue. Stop exercising if you develop chest pain, lightheadedness, or significant shortness of breath.       Call Your Cardiologist If:   You gain 2-3 pounds in one day or 5 pounds in one week.  You have more difficulty breathing.  You are getting more tired with normal activity.  You are more short of breath lying down, or awaken at night short of breath.  You have swelling of your feet or legs.  You urinate less often during the day and more often at night.  You have cramps in your legs.  You have blurred vision or see yellowish-green halos around objects of lights.    Go to the Emergency Room If:   You have pain or tightness in your chest  You are extremely short of breath  You are coughing up pink-frothy mucus  You are traveling and develop symptoms of worsening heart failure                Follow-up with labs: CBC    Total Time Coordinating Care: 31 minutes    Patient had opportunity to ask questions and state understand and agree with therapeutic plan as outlined      Yahaira Patino MD  [facility] Hospitalist

## 2024-08-23 NOTE — TELEPHONE ENCOUNTER
Patient was discharged from hospital yesterday.  She was informed my MD that she needed to be seen in 4 weeks.  Patient declined to schedule first available, November.  Please call

## 2024-08-23 NOTE — TELEPHONE ENCOUNTER
RN called and spoke to pt. Pt scheduled for clinic appt on 9/16/24. Date, time, and location verified with pt. Pt verbalized understanding.       Your Appointments        Monday September 16, 2024 9:00 AM  Follow Up Visit with Veronica Shaffer PA-C  [facility] ([facility]) [facility] [address] [address]  [phone]

## 2024-08-26 ENCOUNTER — OFFICE VISIT (OUTPATIENT)
Dept: CARDIOLOGY CLINIC | Facility: HOSPITAL | Age: 74
End: 2024-08-26
Attending: NURSE PRACTITIONER
Payer: MEDICARE

## 2024-08-26 ENCOUNTER — LABORATORY ENCOUNTER (OUTPATIENT)
Dept: LAB | Facility: HOSPITAL | Age: 74
End: 2024-08-26
Attending: NURSE PRACTITIONER
Payer: MEDICARE

## 2024-08-26 VITALS
HEART RATE: 56 BPM | BODY MASS INDEX: 36 KG/M2 | OXYGEN SATURATION: 98 % | DIASTOLIC BLOOD PRESSURE: 64 MMHG | SYSTOLIC BLOOD PRESSURE: 144 MMHG | WEIGHT: 246.63 LBS

## 2024-08-26 DIAGNOSIS — K62.5 BRBPR (BRIGHT RED BLOOD PER RECTUM): ICD-10-CM

## 2024-08-26 DIAGNOSIS — I50.32 CHRONIC HEART FAILURE WITH PRESERVED EJECTION FRACTION (HFPEF) (HCC): ICD-10-CM

## 2024-08-26 DIAGNOSIS — E11.22 CKD STAGE 3 DUE TO TYPE 2 DIABETES MELLITUS (HCC): Primary | ICD-10-CM

## 2024-08-26 DIAGNOSIS — D50.0 IRON DEFICIENCY ANEMIA DUE TO CHRONIC BLOOD LOSS: ICD-10-CM

## 2024-08-26 DIAGNOSIS — G47.33 OSA (OBSTRUCTIVE SLEEP APNEA): ICD-10-CM

## 2024-08-26 DIAGNOSIS — I10 PRIMARY HYPERTENSION: ICD-10-CM

## 2024-08-26 DIAGNOSIS — I25.10 CORONARY ARTERY DISEASE INVOLVING NATIVE CORONARY ARTERY OF NATIVE HEART WITHOUT ANGINA PECTORIS: ICD-10-CM

## 2024-08-26 DIAGNOSIS — I50.33 ACUTE ON CHRONIC HEART FAILURE WITH PRESERVED EJECTION FRACTION (HFPEF) (HCC): ICD-10-CM

## 2024-08-26 DIAGNOSIS — N18.30 CKD STAGE 3 DUE TO TYPE 2 DIABETES MELLITUS (HCC): Primary | ICD-10-CM

## 2024-08-26 DIAGNOSIS — D62 ACUTE BLOOD LOSS ANEMIA: ICD-10-CM

## 2024-08-26 LAB
ANION GAP SERPL CALC-SCNC: 6 MMOL/L (ref 0–18)
BASOPHILS # BLD AUTO: 0.02 X10(3) UL (ref 0–0.2)
BASOPHILS NFR BLD AUTO: 0.4 %
BNP SERPL-MCNC: 283 PG/ML
BUN BLD-MCNC: 12 MG/DL (ref 9–23)
BUN/CREAT SERPL: 12.6 (ref 10–20)
CALCIUM BLD-MCNC: 9 MG/DL (ref 8.7–10.4)
CHLORIDE SERPL-SCNC: 110 MMOL/L (ref 98–112)
CO2 SERPL-SCNC: 28 MMOL/L (ref 21–32)
CREAT BLD-MCNC: 0.95 MG/DL
DEPRECATED RDW RBC AUTO: 53.4 FL (ref 35.1–46.3)
EGFRCR SERPLBLD CKD-EPI 2021: 64 ML/MIN/1.73M2 (ref 60–?)
EOSINOPHIL # BLD AUTO: 0.15 X10(3) UL (ref 0–0.7)
EOSINOPHIL NFR BLD AUTO: 2.9 %
ERYTHROCYTE [DISTWIDTH] IN BLOOD BY AUTOMATED COUNT: 16.4 % (ref 11–15)
FASTING STATUS PATIENT QL REPORTED: YES
GLUCOSE BLD-MCNC: 130 MG/DL (ref 70–99)
HCT VFR BLD AUTO: 27.8 %
HGB BLD-MCNC: 9.2 G/DL
IMM GRANULOCYTES # BLD AUTO: 0.02 X10(3) UL (ref 0–1)
IMM GRANULOCYTES NFR BLD: 0.4 %
LYMPHOCYTES # BLD AUTO: 1.65 X10(3) UL (ref 1–4)
LYMPHOCYTES NFR BLD AUTO: 31.9 %
MCH RBC QN AUTO: 30.5 PG (ref 26–34)
MCHC RBC AUTO-ENTMCNC: 33.1 G/DL (ref 31–37)
MCV RBC AUTO: 92.1 FL
MONOCYTES # BLD AUTO: 0.36 X10(3) UL (ref 0.1–1)
MONOCYTES NFR BLD AUTO: 7 %
NEUTROPHILS # BLD AUTO: 2.97 X10 (3) UL (ref 1.5–7.7)
NEUTROPHILS # BLD AUTO: 2.97 X10(3) UL (ref 1.5–7.7)
NEUTROPHILS NFR BLD AUTO: 57.4 %
OSMOLALITY SERPL CALC.SUM OF ELEC: 300 MOSM/KG (ref 275–295)
PLATELET # BLD AUTO: 188 10(3)UL (ref 150–450)
POTASSIUM SERPL-SCNC: 4 MMOL/L (ref 3.5–5.1)
RBC # BLD AUTO: 3.02 X10(6)UL
SODIUM SERPL-SCNC: 144 MMOL/L (ref 136–145)
WBC # BLD AUTO: 5.2 X10(3) UL (ref 4–11)

## 2024-08-26 PROCEDURE — 36415 COLL VENOUS BLD VENIPUNCTURE: CPT

## 2024-08-26 PROCEDURE — 83880 ASSAY OF NATRIURETIC PEPTIDE: CPT

## 2024-08-26 PROCEDURE — 80048 BASIC METABOLIC PNL TOTAL CA: CPT

## 2024-08-26 PROCEDURE — 85025 COMPLETE CBC W/AUTO DIFF WBC: CPT

## 2024-08-26 PROCEDURE — 99215 OFFICE O/P EST HI 40 MIN: CPT | Performed by: NURSE PRACTITIONER

## 2024-08-26 RX ORDER — LISINOPRIL 20 MG/1
20 TABLET ORAL DAILY
COMMUNITY
End: 2024-08-26 | Stop reason: CLARIF

## 2024-08-26 RX ORDER — FUROSEMIDE 40 MG
20 TABLET ORAL
Qty: 30 TABLET | Refills: 3 | Status: SHIPPED | OUTPATIENT
Start: 2024-08-26

## 2024-08-26 RX ORDER — LISINOPRIL 40 MG/1
40 TABLET ORAL DAILY
COMMUNITY

## 2024-08-26 NOTE — PATIENT INSTRUCTIONS
Resume furosemide 20 mg three times weekly on Monday, Wednesday and Fridays     Continue all your other medications as prescribed     Blood test for basic metabolic panel and CBC on 9/16/24 before seeing JESSICA LOPEZ     Call if you are having shortness of breath, cough, wheezing, chest pain, dizziness, lightheadedness, heart racing, palpitations, swelling, rapid weight gain, fatigue, weakness, fever or chills.  Call 911 with severe shortness of breath, chest pain or chest pressure not improved after 15 minutes of rest or if feeling faint,  passing out or having a fall     Weigh yourself daily in the morning before breakfast, call if gaining 3 lbs or more overnight or more than 5 lbs in one week.    Follow 2000 mg sodium restricted, heart healthy diet.     Keep daily fluids at 48-64 ounces per day (1.5-2 liters of liquid or 6-8 , 8 oz glasses of liquid)    Follow up with Dr. Saldana on 10/2/24    Follow up with the specialty care clinic on 11/18/24    Always carry a copy of your current medication list with you    Limit sodium to  2000 mg daily. Common high sodium foods include frozen dinners, soups (not homemade), some cereal, vegetable juice, canned vegetables, lunch meats, processed meats like hotdogs, sausage, bruce, pepperoni, soy sauce, pre-packaged rice or potatoes. Please remember to read nutrition labels for sodium content.   www.healthyeating.nhlbi.nih.gov    Exercise daily as tolerated, with goal of doing moderate aerobic exercise like walking for about 30 minutes 5 days per week. Start by walking at a slow to moderate pace for 3-5 minutes 2-3 times a day. Pace your activity to prevent shortness of breath or fatigue. Stop exercise if you develop chest pain, lightheadedness, or significant shortness of breath

## 2024-08-26 NOTE — PROGRESS NOTES
Subjective: reports lightheadedness that she experienced prior to hospitalization resolved. Diuretic not resumed at discharge. Denies chest pain or palpitations. Leg swelling present. Denies shortness of breath.    Weight:  Today 246 lb   Home 246 YESTERDAY Last visit 242.4 lb discharge wt 240 lb  Labs completed: at lab BMP/BNP/CBC  Device:  CardioMEMS Interrogation AT HOME reviewed reading PAD 14 GOAL 11-17  IV placed:  NO  Measurements:  RLE: Calf: 16.5 lb Ankle 12.5\"  LLE Calf: 17\" Ankle: 11\"  Medication given NONE.    Patient and medication assessed. Discussed with APN. Treatments completed leg measurements, cardioMEMS review. Medication given NONE.  Extensive education on disease management.  Reviewed AVS instructions. Patient verbalized understanding.

## 2024-08-26 NOTE — PROGRESS NOTES
[facility]    Rosita Lemus Patient Status:  No patient class for patient encounter    1950 MRN [id number]   Location [facility] MD Dr. Shana Guzmán is a 73 year old female who presents to clinic for assessment and management for acute on chronic heart failure. Recent lower GI  bleed.    Admitted -24 with rectal bleeding. Hgb down to 8.3, at 8.8 upon dc. S/p colonoscopy without active bleeding. Asa held, to resume if HGB stable. Lasix held with hypovolemia. Lisinopril dcd.    Admitted for elective cardiomems procedure 23.  RA 5, RV 39/7, PA 36/6 mean 20, PCW 10, CO 5 / CI 2.2, SVR 1486 /   Hypovolemic. Reduce diuretics to avoid LINDA. Started on plavix 75 mg daily.        Problem List:  Chronic HFpEF, NYHA class III, Stage C, EF 55-60%  Cardiomems 23, Goal range 12-18 mmHg   CKD   CAD with PCI, FAUZIA of LAD x2 2022  DM type 2, on insulin  HTN  HLD    Subjective:  Here with daughter  Feeling good, appetite is good, denies any signs of red, kathy or dark stools  Back on asa 81 mg daily and taking lisinopril 40 mg daily  Denies logan, cp, dizziness, orthopnea with 2 pillows, increased bloating and LE edema, wearing compression stockings,   Home wt up to 246 lbs off lasix since dc, she was taking 40 mg on Mon and Fri, 20 mg on .     Has been less active after her x  passed away 2 weeks ago. Interested in doing cardiac rehab again     Review of Systems:  Constitutional: negative for chills or fever  Respiratory:  negative for cough, hemoptysis and wheezing  Cardiovascular: negative for chest pain, exertional chest pressure/discomfort, near-syncope, orthopnea and palpitations  Gastrointestinal: negative for abdominal pain, diarrhea, melena, nausea and vomiting  Hematologic/lymphatic: negative  Musculoskeletal: negative for muscle weakness and myalgias    Objective:  Lab Results   Component  Value Date/Time    WBC 5.2 08/26/2024 08:33 AM    HGB 9.2 (L) 08/26/2024 08:33 AM    HCT 27.8 (L) 08/26/2024 08:33 AM    .0 08/26/2024 08:33 AM    CREATSERUM 0.95 08/26/2024 08:33 AM    BUN 12 08/26/2024 08:33 AM     08/26/2024 08:33 AM    K 4.0 08/26/2024 08:33 AM     08/26/2024 08:33 AM    CO2 28.0 08/26/2024 08:33 AM     (H) 08/26/2024 08:33 AM    CA 9.0 08/26/2024 08:33 AM    ALB 3.6 04/20/2022 08:49 AM    ALB 4.00 04/20/2022 08:49 AM    ALKPHO 88 04/20/2022 08:49 AM    BILT 0.3 04/20/2022 08:49 AM    TP 7.2 04/20/2022 08:49 AM    TP 7.3 04/20/2022 08:49 AM    AST 13 (L) 04/20/2022 08:49 AM    ALT 18 04/20/2022 08:49 AM    PTT 21.9 (L) 08/21/2024 08:58 AM    INR 1.12 08/21/2024 08:58 AM    PTP 15.1 (H) 08/21/2024 08:58 AM    T4F 1.0 12/05/2021 07:42 AM    TSH 2.170 12/05/2021 07:42 AM    ESRML 20 04/20/2022 08:49 AM    CRP <0.29 04/20/2022 08:49 AM    PHOS 3.3 04/20/2022 08:49 AM    B12 692 10/14/2021 06:32 AM    PGLU 80 08/22/2024 05:09 PM       Labs drawn by  lab: bmp, bnp, CBC, results reviewed with patient.  Personally interpreted results.     /64 (BP Location: Right arm, Patient Position: Sitting, Cuff Size: large)   Pulse 56   Wt 246 lb 9.6 oz (111.9 kg)   SpO2 98%   BMI 36.42 kg/m²       Date  Clinic wt Home wt [facility] wt DC wt IV med  cardiomems   11/8/23   235      12/28/23 238        1/3/24   236      2/6/24 240 238     12 mmHg   6/4/24 239     13 mmHg    8/22/24    240  9 mmHg   8/26/24 246 246    14 mmhg     General appearance: alert, appears stated age and cooperative  Neck: no JVD at 90 degrees  Lungs: clear to auscultation bilaterally  Heart: S1, S2 normal, + murmur, no click, rub or gallop, regular rate and rhythm- HR 50 bpm   Abdomen: soft, non-tender; bowel sounds normal; no masses,  mod abdominal distension  Extremities: extremities normal, atraumatic, no cyanosis, +2 stephanie LE edema, from below knees to pedals  Pulses: 2+ and symmetric  Neurologic: Grossly  normal  Measurements:   24: RLE: Calf: 16.5 lb Ankle 12.5\"  LLE Calf: 17\" Ankle: 11\"   24:RLE Calf: 16.75\" Ankle: 12.75\"  LLE Calf: 17.75\" Ankle: 12   24: RLE Calf: 16.5\" Ankle: 13\"    LLE Calf: 17.5\" Ankle: 11\"     Diagnostics:    Rhythm: 24 SR 60 bpm  EK24 sinus taryn 47 bpm.    Echocardiogram: 2023, EF 55-60%, no wma, + diastolic dysfunction, mod stephanie atrial dilation, mild MR, mild/mod TR, RA 8 mmHg. PA 35 mmhg  Heart cath: 2022 FAUZIA X2 to LAD , PCI   RHC: 23  RA 5  RV 39/7  PA 36/6 mean 20  PCW 10     CO 5 / CI 2.2  SVR 1486 /     Recommendations:  Reduce diuretics to avoid LINDA     Devices:   [  ]ICD  [ ]BIV-ICD  [  ]PPM  [  ]Life Vest  [x  ]CardioMEMS      Assessment:  Chronic HFp EF, NYHA class III, stage C  -EF 55-60%  -s/p cardiomems insertion 23  -recent admission for GI bleed due to acute diverticulitis, furosemide, lisinopril and asa held.   -diuretics: furosemide on hold ( was on 40 mg MF, 20 mg on wed)  -GDMT: coreg, lisinopril( was held in-pt), jardiance  -PAD 14 mmHg, ranging 8-14 mmHg in last week, trending up in the last 5 days off furosemide. (PAD goal 12-18 )  -weight trending up with increased stephanie LE edema, + bloating, no logan, cp or dizziness  - BP high normal today   -Renal function normal, bun/cr:12/0.95<- 25/1.22, K 4.0  - today <-(6/3/24) 92  <-100   -mild hypervolemic today with mild chronic stephanie LE edema  -resume furosemide 20 mg 3 times weekly  -Will continue monitoring CardioMEMS and renal function and call pt next week for update  - bmp and cbc on 24  -follow up with Dr. Saldana 10/2/24    Recent GI bleed and hx of iron deficiency anemia   -24-bloody stools, HGB 13 -> 10-> 8.3 -> 8.8  - iron counts (6/3/24) iron sat 18%, ferritin 28  -s/-p venofer infusions weekly x3, completed 24  -no active bleeding on colonoscopy  -no recurrent report rectal bleeding or melena  -HGB 9.2 today back on asa 81 mg daily   -repeat irons  studies 11/2024    CAD with PCI of LAD 1/2022  -back on asa, con't on statin    CKD stage IIIa-->II  -renal function normal/stable  -eGFR 64   - sees Dr. Whitehead     HTN  -BP borderline high normal   -continue amlodipine, coreg, jardiance, restarted lisinopril after discharge  -restart furosemide 20mg 3x weekly today     LILY, not using CPAP  -Does not tolerate mask  -Consider seeing pulmonary specialist, recommend patient discuss with Dr. Tolbert      Plan:     Patient Instructions   Resume furosemide 20 mg three times weekly on Monday, Wednesday and Fridays     Continue all your other medications as prescribed     Blood test for basic metabolic panel and CBC on 9/16/24 before seeing JESSICA LOPEZ     Call if you are having shortness of breath, cough, wheezing, chest pain, dizziness, lightheadedness, heart racing, palpitations, swelling, rapid weight gain, fatigue, weakness, fever or chills.  Call 911 with severe shortness of breath, chest pain or chest pressure not improved after 15 minutes of rest or if feeling faint,  passing out or having a fall     Weigh yourself daily in the morning before breakfast, call if gaining 3 lbs or more overnight or more than 5 lbs in one week.    Follow 2000 mg sodium restricted, heart healthy diet.     Keep daily fluids at 48-64 ounces per day (1.5-2 liters of liquid or 6-8 , 8 oz glasses of liquid)    Follow up with Dr. Saldana on 10/2/24    Follow up with the specialty care clinic on 11/18/24    Always carry a copy of your current medication list with you    Limit sodium to  2000 mg daily. Common high sodium foods include frozen dinners, soups (not homemade), some cereal, vegetable juice, canned vegetables, lunch meats, processed meats like hotdogs, sausage, bruce, pepperoni, soy sauce, pre-packaged rice or potatoes. Please remember to read nutrition labels for sodium content.   www.healthyeating.nhlbi.nih.gov    Exercise daily as tolerated, with goal of doing moderate aerobic  exercise like walking for about 30 minutes 5 days per week. Start by walking at a slow to moderate pace for 3-5 minutes 2-3 times a day. Pace your activity to prevent shortness of breath or fatigue. Stop exercise if you develop chest pain, lightheadedness, or significant shortness of breath        Current Outpatient Medications:     furosemide 40 MG Oral Tab, Take 0.5 tablets (20 mg total) by mouth 3 (three) times a week. Monday, Wednesday and Fridays and as directed, Disp: 30 tablet, Rfl: 3    lisinopril 40 MG Oral Tab, Take 1 tablet (40 mg total) by mouth daily., Disp: , Rfl:     [START ON 8/27/2024] aspirin 81 MG Oral Tab EC, Take 1 tablet (81 mg total) by mouth daily. Do not take until cleared by HF clinic when CBC is reviewed on Monday 8/26, Disp: 30 tablet, Rfl: 0    metFORMIN 500 MG Oral Tab, Take 1 tablet (500 mg total) by mouth daily with breakfast., Disp: , Rfl:     famotidine 10 MG Oral Tab, Take 1 tablet (10 mg total) by mouth daily as needed for Heartburn., Disp: , Rfl:     empagliflozin 10 MG Oral Tab, Take 0.5 tablets (5 mg total) by mouth daily., Disp: , Rfl:     amLODIPine 10 MG Oral Tab, Take 1 tablet (10 mg total) by mouth at bedtime., Disp: , Rfl:     ROSUVASTATIN 40 MG Oral Tab, TAKE ONE TABLET BY MOUTH DAILY, Disp: 90 tablet, Rfl: 0    CARVEDILOL 25 MG Oral Tab, TAKE ONE TABLET BY MOUTH TWICE A DAY WITH MEALS, Disp: 180 tablet, Rfl: 1    SITagliptin Phosphate (JANUVIA) 50 MG Oral Tab, Take 1 tablet (50 mg total) by mouth daily. (Patient taking differently: Take 2 tablets (100 mg total) by mouth Noon.), Disp: 90 tablet, Rfl: 1    Elastic Bandages & Supports (MEDICAL COMPRESSION STOCKINGS) Does not apply Misc, 1 each daily. Knee high 15-20 mmhg wear during the day off at night, Disp: 1 each, Rfl: 0    ACCU-CHEK SOFTCLIX LANCETS Does not apply Misc, TEST BLOOD GLUCOSE ONCE DAILY, Disp: 100 each, Rfl: 3    Glucose Blood (ACCU-CHEK MILLA PLUS) In Vitro Strip, 2 (two) times a day., Disp: , Rfl:      AMANDA ALCARAZ NP  8/26/24        45 minutes spent on patient education, chart review and documentation:  Patient instructed regarding clinic procedures, hours, purpose of clinic visits, sodium restricted diet, low sodium foods, fluid restrictions, daily weights, medication regimen s/s of heart failure exacerbation and when to call APN/clinic. Provided patient with  counseling, coordination of care and education given. Patient receptive.

## 2024-09-10 RX ORDER — LISINOPRIL 40 MG/1
40 TABLET ORAL DAILY
Qty: 30 TABLET | Refills: 11 | Status: SHIPPED | OUTPATIENT
Start: 2024-09-10

## 2024-09-16 ENCOUNTER — LAB ENCOUNTER (OUTPATIENT)
Dept: LAB | Facility: HOSPITAL | Age: 74
End: 2024-09-16
Attending: NURSE PRACTITIONER
Payer: MEDICARE

## 2024-09-16 ENCOUNTER — OFFICE VISIT (OUTPATIENT)
Facility: CLINIC | Age: 74
End: 2024-09-16

## 2024-09-16 ENCOUNTER — NURSE ONLY (OUTPATIENT)
Dept: CARDIOLOGY CLINIC | Facility: HOSPITAL | Age: 74
End: 2024-09-16
Attending: NURSE PRACTITIONER
Payer: MEDICARE

## 2024-09-16 ENCOUNTER — TELEPHONE (OUTPATIENT)
Facility: CLINIC | Age: 74
End: 2024-09-16

## 2024-09-16 VITALS
HEIGHT: 69 IN | SYSTOLIC BLOOD PRESSURE: 138 MMHG | DIASTOLIC BLOOD PRESSURE: 81 MMHG | WEIGHT: 236 LBS | BODY MASS INDEX: 34.96 KG/M2

## 2024-09-16 DIAGNOSIS — E11.22 CKD STAGE 3 DUE TO TYPE 2 DIABETES MELLITUS (HCC): ICD-10-CM

## 2024-09-16 DIAGNOSIS — K63.5 CECAL POLYP: Primary | ICD-10-CM

## 2024-09-16 DIAGNOSIS — I50.32 CHRONIC HEART FAILURE WITH PRESERVED EJECTION FRACTION (HFPEF) (HCC): ICD-10-CM

## 2024-09-16 DIAGNOSIS — Z87.19 HISTORY OF GI DIVERTICULAR BLEED: ICD-10-CM

## 2024-09-16 DIAGNOSIS — Z12.11 COLON CANCER SCREENING: Primary | ICD-10-CM

## 2024-09-16 DIAGNOSIS — N18.30 CKD STAGE 3 DUE TO TYPE 2 DIABETES MELLITUS (HCC): ICD-10-CM

## 2024-09-16 DIAGNOSIS — Z86.010 HISTORY OF ADENOMATOUS POLYP OF COLON: ICD-10-CM

## 2024-09-16 DIAGNOSIS — D50.0 IRON DEFICIENCY ANEMIA DUE TO CHRONIC BLOOD LOSS: ICD-10-CM

## 2024-09-16 DIAGNOSIS — I50.33 ACUTE ON CHRONIC HEART FAILURE WITH PRESERVED EJECTION FRACTION (HFPEF) (HCC): Primary | ICD-10-CM

## 2024-09-16 PROBLEM — D69.6 THROMBOCYTOPENIA: Chronic | Status: ACTIVE | Noted: 2024-09-16

## 2024-09-16 PROBLEM — D69.6 THROMBOCYTOPENIA (HCC): Chronic | Status: ACTIVE | Noted: 2024-09-16

## 2024-09-16 LAB
ANION GAP SERPL CALC-SCNC: 5 MMOL/L (ref 0–18)
BASOPHILS # BLD AUTO: 0.03 X10(3) UL (ref 0–0.2)
BASOPHILS NFR BLD AUTO: 0.6 %
BUN BLD-MCNC: 13 MG/DL (ref 9–23)
BUN/CREAT SERPL: 13.1 (ref 10–20)
CALCIUM BLD-MCNC: 9.4 MG/DL (ref 8.7–10.4)
CHLORIDE SERPL-SCNC: 109 MMOL/L (ref 98–112)
CO2 SERPL-SCNC: 29 MMOL/L (ref 21–32)
CREAT BLD-MCNC: 0.99 MG/DL
DEPRECATED RDW RBC AUTO: 51.2 FL (ref 35.1–46.3)
EGFRCR SERPLBLD CKD-EPI 2021: 61 ML/MIN/1.73M2 (ref 60–?)
EOSINOPHIL # BLD AUTO: 0.16 X10(3) UL (ref 0–0.7)
EOSINOPHIL NFR BLD AUTO: 3.2 %
ERYTHROCYTE [DISTWIDTH] IN BLOOD BY AUTOMATED COUNT: 15.1 % (ref 11–15)
FASTING STATUS PATIENT QL REPORTED: YES
GLUCOSE BLD-MCNC: 120 MG/DL (ref 70–99)
HCT VFR BLD AUTO: 37.3 %
HGB BLD-MCNC: 12 G/DL
IMM GRANULOCYTES # BLD AUTO: 0.01 X10(3) UL (ref 0–1)
IMM GRANULOCYTES NFR BLD: 0.2 %
LYMPHOCYTES # BLD AUTO: 1.74 X10(3) UL (ref 1–4)
LYMPHOCYTES NFR BLD AUTO: 34.7 %
MCH RBC QN AUTO: 29.4 PG (ref 26–34)
MCHC RBC AUTO-ENTMCNC: 32.2 G/DL (ref 31–37)
MCV RBC AUTO: 91.4 FL
MONOCYTES # BLD AUTO: 0.46 X10(3) UL (ref 0.1–1)
MONOCYTES NFR BLD AUTO: 9.2 %
NEUTROPHILS # BLD AUTO: 2.61 X10 (3) UL (ref 1.5–7.7)
NEUTROPHILS # BLD AUTO: 2.61 X10(3) UL (ref 1.5–7.7)
NEUTROPHILS NFR BLD AUTO: 52.1 %
OSMOLALITY SERPL CALC.SUM OF ELEC: 297 MOSM/KG (ref 275–295)
PLATELET # BLD AUTO: 219 10(3)UL (ref 150–450)
POTASSIUM SERPL-SCNC: 4 MMOL/L (ref 3.5–5.1)
RBC # BLD AUTO: 4.08 X10(6)UL
SODIUM SERPL-SCNC: 143 MMOL/L (ref 136–145)
WBC # BLD AUTO: 5 X10(3) UL (ref 4–11)

## 2024-09-16 PROCEDURE — 99214 OFFICE O/P EST MOD 30 MIN: CPT | Performed by: PHYSICIAN ASSISTANT

## 2024-09-16 PROCEDURE — 36415 COLL VENOUS BLD VENIPUNCTURE: CPT

## 2024-09-16 PROCEDURE — 1160F RVW MEDS BY RX/DR IN RCRD: CPT | Performed by: PHYSICIAN ASSISTANT

## 2024-09-16 PROCEDURE — 3075F SYST BP GE 130 - 139MM HG: CPT | Performed by: PHYSICIAN ASSISTANT

## 2024-09-16 PROCEDURE — 85025 COMPLETE CBC W/AUTO DIFF WBC: CPT

## 2024-09-16 PROCEDURE — 3079F DIAST BP 80-89 MM HG: CPT | Performed by: PHYSICIAN ASSISTANT

## 2024-09-16 PROCEDURE — 93264 REM MNTR WRLS P-ART PRS SNR: CPT | Performed by: NURSE PRACTITIONER

## 2024-09-16 PROCEDURE — 1159F MED LIST DOCD IN RCRD: CPT | Performed by: PHYSICIAN ASSISTANT

## 2024-09-16 PROCEDURE — 3008F BODY MASS INDEX DOCD: CPT | Performed by: PHYSICIAN ASSISTANT

## 2024-09-16 PROCEDURE — 80048 BASIC METABOLIC PNL TOTAL CA: CPT

## 2024-09-16 RX ORDER — SODIUM, POTASSIUM,MAG SULFATES 17.5-3.13G
SOLUTION, RECONSTITUTED, ORAL ORAL
Qty: 1 EACH | Refills: 0 | Status: SHIPPED | OUTPATIENT
Start: 2024-09-16

## 2024-09-16 NOTE — PROGRESS NOTES
[facility] - Gastroenterology                                                                                                               Reason for consult:   Chief Complaint   Patient presents with    Follow - Up     Hospital follow up       Requesting physician or provider: Fernanda Tolbert MD      HPI:   Rosita Lemus is a 73 year old year-old female with history of BMI 36.33, DM2, diverticulosis c/b diverticulitis, CAD s/p PCI 12/2024, HFpEF, HTN, HLD, LILY who presented to [facility] with bloody stools. Colonoscopy with blood seen to the splenic flexure, likely L sided diverticular bleeding. Hemoglobin remained stable and no further bleeding seen. Presents for follow up, discuss removal of polyp seen on colonoscopy.     Having a BM every day. Straining on occasion. Does have incomplete evacuation   Brown in color. No dark black. No bright red blood in stool.    Appetite has been good. Weight has stable.     she denies abdominal pain. she denies nausea and/or vomiting. she denies bloating.    No tobacco use. No alcohol use.     Pertinent Family Hx:  - No known history of esophageal or gastric CA. Father Dx colon cancer in his 60's  - No known IBD  - No known liver pathologies     Endoscopy Hx:  -Colonoscopy 8/2024  Impression:   Findings:   1. 1 polyp(s) noted 4 mm polyp in the cecum not removed given indication for exam     2. Diverticulosis: severe L sided diverticulosis with fresh blood to the splenic flexure. Cleaned blood and no active bleeding noted. .     3. Terminal ileum: the visualized mucosa appeared normal.     4. A retroflexed view of the rectum revealed hemorrhoids.     5. The colonic mucosa throughout the colon showed normal vascular pattern. 5 mm Avm in the ascending colon not bleeding     6. LORENZO: normal rectal tone, no masses palpated.     - Colonoscopy 12/2022:               Tattoo and polypectomy scar encountered in the cecum as per previous history,  carefully examined with no recurrence identified.  Photograph taken.  Moderate severity diverticulosis of the ascending colon; severe clonic diverticulosis descending and sigmoid colon.  Large redundant internal hemorrhoids with red marks.     Social Hx:  - No tobacco use/No ETOH  - Denies cannabis/illicit drug use  - Lives with daughter  - Occupation: Retired [employer]          Wt Readings from Last 6 Encounters:   09/16/24 236 lb (107 kg)   08/26/24 246 lb 9.6 oz (111.9 kg)   08/22/24 240 lb 3.2 oz (109 kg)   07/16/24 242 lb (109.8 kg)   07/16/24 242 lb (109.8 kg)   07/08/24 242 lb 6.4 oz (110 kg)        History, Medications, Allergies, ROS:      Past Medical History:    Diabetes (HCC)    Diabetes mellitus (HCC)    Diverticulitis    Essential hypertension    High blood pressure    High cholesterol    Hyperlipidemia    Sleep apnea      Past Surgical History:   Procedure Laterality Date    Angiogram N/A 01/07/2022    Cataract extraction w/  intraocular lens implant Right 2017    Dr. Griffin    Colonoscopy N/A 6/5/2020    Procedure: COLONOSCOPY;  Surgeon: Ye Ling MD;  Location: [facility] ENDOSCOPY    Colonoscopy N/A 8/21/2024    Procedure: COLONOSCOPY;  Surgeon: Vita Adams MD;  Location: [facility] ENDOSCOPY    Colonoscopy screening - referral N/A 12/27/2022    Procedure: COLONOSCOPY-SCREENING;  Surgeon: Ye Ling MD;  Location: [facility] ENDOSCOPY    Hysterectomy      Vit for macular hole Right 2016    surgery for macular hole in 2016- Dr. Ross?    Yag capsulotomy - od - right eye Right 2017    Dr. Griffin      Family Hx:   Family History   Problem Relation Age of Onset    Cancer Father 62        COLON CANCER    Hypertension Mother     Other (Other) Mother         CIRCULATION PROBLEM    Other (CHF) Other     Macular degeneration Neg     Glaucoma Neg       Social History:   Social History     Socioeconomic History    Marital status: Single   Tobacco Use    Smoking status: Never    Smokeless tobacco:  Never   Vaping Use    Vaping status: Never Used   Substance and Sexual Activity    Alcohol use: Not Currently    Drug use: Never     Social Determinants of Health     Food Insecurity: No Food Insecurity (8/21/2024)    Food Insecurity     Food Insecurity: Never true   Transportation Needs: No Transportation Needs (8/21/2024)    Transportation Needs     Lack of Transportation: No   Physical Activity: Unknown (7/17/2020)    [facility]    Exercise Vital Sign     Days of Exercise per Week: 0 days   Housing Stability: Low Risk  (8/21/2024)    Housing Stability     Housing Instability: No        Medications (Active prior to today's visit):  Current Outpatient Medications   Medication Sig Dispense Refill    Na Sulfate-K Sulfate-Mg Sulf (SUPREP BOWEL PREP KIT) 17.5-3.13-1.6 GM/177ML Oral Solution Take prep as directed by gastro office. May substitute with Trilyte/generic equivalent if needed. 1 each 0    lisinopril 40 MG Oral Tab Take 1 tablet (40 mg total) by mouth daily. 30 tablet 11    furosemide 40 MG Oral Tab Take 0.5 tablets (20 mg total) by mouth 3 (three) times a week. Monday, Wednesday and Fridays and as directed 30 tablet 3    aspirin 81 MG Oral Tab EC Take 1 tablet (81 mg total) by mouth daily. Do not take until cleared by HF clinic when CBC is reviewed on Monday 8/26 30 tablet 0    metFORMIN 500 MG Oral Tab Take 1 tablet (500 mg total) by mouth daily with breakfast.      famotidine 10 MG Oral Tab Take 1 tablet (10 mg total) by mouth daily as needed for Heartburn.      Elastic Bandages & Supports (MEDICAL COMPRESSION STOCKINGS) Does not apply Misc 1 each daily. Knee high 15-20 mmhg wear during the day off at night 1 each 0    empagliflozin 10 MG Oral Tab Take 0.5 tablets (5 mg total) by mouth daily.      amLODIPine 10 MG Oral Tab Take 1 tablet (10 mg total) by mouth at bedtime.      ROSUVASTATIN 40 MG Oral Tab TAKE ONE TABLET BY MOUTH DAILY 90 tablet 0    ACCU-CHEK  SOFTCLIX LANCETS Does not apply Misc TEST BLOOD GLUCOSE ONCE DAILY 100 each 3    CARVEDILOL 25 MG Oral Tab TAKE ONE TABLET BY MOUTH TWICE A DAY WITH MEALS 180 tablet 1    SITagliptin Phosphate (JANUVIA) 50 MG Oral Tab Take 1 tablet (50 mg total) by mouth daily. (Patient taking differently: Take 2 tablets (100 mg total) by mouth Noon.) 90 tablet 1    Glucose Blood (ACCU-CHEK MILLA PLUS) In Vitro Strip 2 (two) times a day.         Allergies:  Allergies   Allergen Reactions    Flu Virus Vaccine OTHER (SEE COMMENTS)     Other reaction(s): Propensity to adverse reactions to drug    Sulfa Antibiotics OTHER (SEE COMMENTS)    Other OTHER (SEE COMMENTS)     wool       ROS:   CONSTITUTIONAL: negative for fevers, chills, sweats and weight loss  EYES Negative for red eyes, yellow eyes, changes in vision  HEENT: Negative for dysphagia and hoarseness  RESPIRATORY: Negative for cough and shortness of breath  CARDIOVASCULAR: Negative for chest pain, palpitations  GASTROINTESTINAL: See HPI  GENITOURINARY: Negative for dysuria and frequency  MUSCULOSKELETAL: Negative for arthralgias and myalgias  NEUROLOGICAL: Negative for dizziness and headaches  BEHAVIOR/PSYCH: Negative for anxiety and poor appetite    PHYSICAL EXAM:   Blood pressure 138/81, height 5' 9\" (1.753 m), weight 236 lb (107 kg).    GEN: WD/WN, NAD  HEENT: Supple symmetrical, trachea midline  CV: RRR, the extremities are warm and well perfused   LUNGS: No increased work of breathing  ABDOMEN: No scars, normal bowel sounds, soft, non-tender, non-distended no rebound or guarding, no masses, no hepatomegaly  MSK: No redness, no warmth, no swelling of joints  SKIN: No jaundice, no erythema, no rashes  HEMATOLOGIC: No bleeding, no bruising  NEURO: Alert and interactive, normal gait    Labs/Imaging/Procedures:     Patient's pertinent labs and imaging were reviewed and discussed with patient today.     Lab Results   Component Value Date    WBC 5.0 09/16/2024    RBC 4.08  09/16/2024    HGB 12.0 09/16/2024    HCT 37.3 09/16/2024    MCV 91.4 09/16/2024    MCH 29.4 09/16/2024    MCHC 32.2 09/16/2024    RDW 15.1 (H) 09/16/2024    .0 09/16/2024        Lab Results   Component Value Date     (H) 09/16/2024    BUN 13 09/16/2024    BUNCREA 13.1 09/16/2024    CREATSERUM 0.99 09/16/2024    ANIONGAP 5 09/16/2024    GFRNAA 34 (L) 04/20/2022    GFRAA 39 (L) 04/20/2022    CA 9.4 09/16/2024    OSMOCALC 297 (H) 09/16/2024    ALKPHO 88 04/20/2022    AST 13 (L) 04/20/2022    ALT 18 04/20/2022    BILT 0.3 04/20/2022    TP 7.2 04/20/2022    TP 7.3 04/20/2022    ALB 3.6 04/20/2022    ALB 4.00 04/20/2022    GLOBULIN 3.6 04/20/2022     09/16/2024    K 4.0 09/16/2024     09/16/2024    CO2 29.0 09/16/2024        No results found.          .  ASSESSMENT/PLAN:   Rosita Lemus is a 73 year old year-old female with history of BMI 36.33, DM2, diverticulosis c/b diverticulitis, CAD s/p PCI 12/2024, HFpEF, HTN, HLD, LILY who presented to [facility] with bloody stools. Colonoscopy with blood seen to the splenic flexure, likely L sided diverticular bleeding. Hemoglobin remained stable and no further bleeding seen. Presents for follow up, discuss removal of polyp seen on colonoscopy.     #diverticular bleed  #cecal polyp  -patient having brown stool, can have straining and incomplete evacuation  -lab work repeated today, hemoglobin now 12 up from 7 from prior  -appetite has been good, no weight loss  -did have cecal polyp that was not removed due to active bleed, discussed plan for repeat colonoscopy with polypectomy  -patient agreeable   -plan to work on regulation of BM    Recommendations:  -Increase water intake to 64oz of water per day  -Increase fiber to 20-30 g per day with fruits, vegetables and whole grains  -Increase exercise to 150 mins/week  -Do not ignore urge to defecate  -Avoid artifical sugars  -If bowel habits change or constipation worsens, call our office sooner         1.  Schedule colonoscopy with Dr. Ling with [facility] [Diagnosis: crc screening, cecal polyp]    2.  bowel prep from pharmacy (split suprep)    3. Hold metformin day before and day of. Hold Januvia day before and day of.   Hold Jardiance 4 days prior.     4. Read all bowel prep instructions carefully. Bowel prep instructions can also be found online at:  [email or web address]     5. AVOID seeds, nuts, popcorn, raw fruits and vegetables for 3 days before procedure    6. You MAY need to go for COVID testing 72 hours before procedure. The testing team will call you a few days before your procedure to discuss with you if testing is required. If you are asked to go for COVID testing and do not completed the test, the procedure cannot be performed.     7. If you start any NEW medication after your visit today, please notify us. Certain medications (like iron or weight loss medications) will need to be held before the procedure, or the procedure cannot be performed safely.      Orders This Visit:  No orders of the defined types were placed in this encounter.      Meds This Visit:  Requested Prescriptions     Signed Prescriptions Disp Refills    Na Sulfate-K Sulfate-Mg Sulf (SUPREP BOWEL PREP KIT) 17.5-3.13-1.6 GM/177ML Oral Solution 1 each 0     Sig: Take prep as directed by gastro office. May substitute with Trilyte/generic equivalent if needed.       Imaging & Referrals:  None    ENDOSCOPIC RISK BENEFIT DISCUSSION: I described the procedure in great detail with the patient. I discussed the risks and benefits, including but not limited to: bleeding, perforation, infection, anesthesia complications, and even death. Patient will be NPO after midnight and will have a person physically present at time of pick-up to drive patient home. Patient verbalized understanding and agrees to proceed with procedure as planned.    Veronica Shaffer PA-C   9/16/2024        This note was partially prepared using Robin  recognition dictation software. As a result, errors may occur. When identified, these errors have been corrected. While every attempt is made to correct errors during dictation, discrepancies may still exist.

## 2024-09-16 NOTE — PROGRESS NOTES
CARDIOMEMS REMOTE MONTHLY BILLING TEMPLATE        Remote Hemodynamic Device Report Summary - Remote Visit    Patient Name: Rosita Lemus MRN: [id number] : 1950         Provider:   Amanda MELISSA  Cardiologist: Dr. Narseh Saldana                                    Diagnosis: HFpEF     Reporting period: 08/15/2024 - 2024  Cardiomems Goal: PAD goal range 12-18 mmHg > 11-17.     Implant date 23; PA 36/6, m 20, PCWP 10, DPG -4       Current readings:   PAD 8 mmHg on 24  PAD readings ranging 8-14  mmHg in last 30 days.      Remote monitoring documentation:   Reviewed above readings weekly. Euvolemic on lower dose diuretics, adjusting diuretics prn.   Plan:  Continue PA pressure monitoring weekly, continued patient assessment and communication per abbot and Epic messaging, phone or clinic visit to maintain optimal range of PA pressures  AMANDA ALCARAZ NP, 24, 4:04 Pm

## 2024-09-16 NOTE — TELEPHONE ENCOUNTER
Scheduled for:  Colonoscopy 37899  Provider Name:    Date:  Tues, 03/04/2025  Location:  [facility]  Sedation:  [facility]  Time:  8;40am (pt is aware Endo will call with arrival time     Prep:  Suprep  Meds/Allergies Reconciled?:  Yes    Diagnosis with codes:  crc screening Z12.11 cecal polyp K63.5  Was patient informed to call insurance with codes (Y/N): Yes      Referral sent?:  Referral was sent at the time of electronic surgical scheduling.    EM or [facility] notified?:  I sent an electronic request to Endo Scheduling and received a confirmation today.       Medication Orders:  . Hold metformin day before and day of. Hold Januvia day before and day of.   Hold Jardiance 4 days prior.  Misc Orders:  None      Further instructions given by staff:  I discussed the prep instructions with the patient which she verbally understood and is aware that I will send the instructions today.via [employer]

## 2024-09-16 NOTE — TELEPHONE ENCOUNTER
Schedulers- Patient was seen in office today, please call patient to schedule procedure per providers orders below. I reviewed and handed a copy of prep instructions with patient in office as well as medications. Patient is aware of different locations and our providers possibly booking out. No further questions asked.        1. Schedule colonoscopy with Dr. Ling with [facility] [Diagnosis: crc screening, cecal polyp]     2.  bowel prep from pharmacy (split suprep)     3. Hold metformin day before and day of. Hold Januvia day before and day of.   Hold Jardiance 4 days prior.      4. Read all bowel prep instructions carefully. Bowel prep instructions can also be found online at:  [email or web address]      5. AVOID seeds, nuts, popcorn, raw fruits and vegetables for 3 days before procedure     6. You MAY need to go for COVID testing 72 hours before procedure. The testing team will call you a few days before your procedure to discuss with you if testing is required. If you are asked to go for COVID testing and do not completed the test, the procedure cannot be performed.      7. If you start any NEW medication after your visit today, please notify us. Certain medications (like iron or weight loss medications) will need to be held before the procedure, or the procedure cannot be performed safely.

## 2024-09-16 NOTE — PATIENT INSTRUCTIONS
Recommendations:  -Increase water intake to 64oz of water per day  -Increase fiber to 20-30 g per day with fruits, vegetables and whole grains  -Increase exercise to 150 mins/week  -Do not ignore urge to defecate  -Avoid artifical sugars  -If bowel habits change or constipation worsens, call our office sooner         1. Schedule colonoscopy with Dr. Lnig with [facility] [Diagnosis: crc screening, cecal polyp]    2.  bowel prep from pharmacy ([facility] suprep)    3. Hold metformin day before and day of. Hold Januvia day before and day of.   Hold Jardiance 4 days prior.     4. Read all bowel prep instructions carefully. Bowel prep instructions can also be found online at:  [email or web address]     5. AVOID seeds, nuts, popcorn, raw fruits and vegetables for 3 days before procedure    6. You MAY need to go for COVID testing 72 hours before procedure. The testing team will call you a few days before your procedure to discuss with you if testing is required. If you are asked to go for COVID testing and do not completed the test, the procedure cannot be performed.     7. If you start any NEW medication after your visit today, please notify us. Certain medications (like iron or weight loss medications) will need to be held before the procedure, or the procedure cannot be performed safely.

## 2024-10-10 ENCOUNTER — TELEPHONE (OUTPATIENT)
Dept: CARDIOLOGY CLINIC | Facility: HOSPITAL | Age: 74
End: 2024-10-10

## 2024-10-10 RX ORDER — LOSARTAN POTASSIUM 100 MG/1
100 TABLET ORAL DAILY
COMMUNITY

## 2024-10-10 NOTE — TELEPHONE ENCOUNTER
Rosita called to let us know that when she saw Dr. Saldana 10/9, he stopped lisinopril and started losartan-potas 100 mg daily due to elevated  at visit.    Med rec updated.

## 2024-10-15 ENCOUNTER — OFFICE VISIT (OUTPATIENT)
Dept: ENDOCRINOLOGY CLINIC | Facility: CLINIC | Age: 74
End: 2024-10-15

## 2024-10-15 VITALS
SYSTOLIC BLOOD PRESSURE: 134 MMHG | BODY MASS INDEX: 34.51 KG/M2 | HEART RATE: 57 BPM | HEIGHT: 69 IN | DIASTOLIC BLOOD PRESSURE: 82 MMHG | WEIGHT: 233 LBS

## 2024-10-15 DIAGNOSIS — N18.30 CONTROLLED TYPE 2 DIABETES MELLITUS WITH STAGE 3 CHRONIC KIDNEY DISEASE, UNSPECIFIED WHETHER LONG TERM INSULIN USE (HCC): Primary | ICD-10-CM

## 2024-10-15 DIAGNOSIS — E11.22 CONTROLLED TYPE 2 DIABETES MELLITUS WITH STAGE 3 CHRONIC KIDNEY DISEASE, UNSPECIFIED WHETHER LONG TERM INSULIN USE (HCC): Primary | ICD-10-CM

## 2024-10-15 LAB
GLUCOSE BLOOD: 116
HEMOGLOBIN A1C: 6.2 % (ref 4.3–5.6)
TEST STRIP LOT #: NORMAL NUMERIC

## 2024-10-15 PROCEDURE — 1159F MED LIST DOCD IN RCRD: CPT

## 2024-10-15 PROCEDURE — 99214 OFFICE O/P EST MOD 30 MIN: CPT

## 2024-10-15 PROCEDURE — 1160F RVW MEDS BY RX/DR IN RCRD: CPT

## 2024-10-15 PROCEDURE — 3008F BODY MASS INDEX DOCD: CPT

## 2024-10-15 PROCEDURE — 82947 ASSAY GLUCOSE BLOOD QUANT: CPT

## 2024-10-15 PROCEDURE — 83036 HEMOGLOBIN GLYCOSYLATED A1C: CPT

## 2024-10-15 PROCEDURE — 3075F SYST BP GE 130 - 139MM HG: CPT

## 2024-10-15 PROCEDURE — 3044F HG A1C LEVEL LT 7.0%: CPT

## 2024-10-15 PROCEDURE — 3079F DIAST BP 80-89 MM HG: CPT

## 2024-10-15 NOTE — PROGRESS NOTES
CARDIOMEMS REMOTE MONTHLY BILLING TEMPLATE        Remote Hemodynamic Device Report Summary - Remote Visit    Patient Name: Rosita Lemus MRN: [id number] : 1950         Provider:   Amanda MELISSA  Cardiologist: Dr. Naresh Saldana                                    Diagnosis: Chronic HFpEF     Reporting period: 2024 - 10/16/2024  Cardiomems Goal: PAD goal range 12-18 mmHg > 11-17.     Implant date 23; PA 36/6, m 20, PCWP 10, DPG -4       Current readings:   PAD 11 mmHg on 10/16/24  PAD readings ranging 9-12  mmHg in last 30 days.    Remote monitoring documentation:   Reviewed above readings weekly. Euvolemic on lower dose diuretics, adjusting diuretics prn.   Plan:  Continue PA pressure monitoring weekly, continued patient assessment and communication per abbot and Epic messaging, phone or clinic visit to maintain optimal range of PA pressures  AMANDA ALCARAZ NP, 10/16/2024

## 2024-10-15 NOTE — PROGRESS NOTES
Name: Rosita Lemus  Date: 10/15/2024    Referring Physician: No ref. provider found    HISTORY OF PRESENT ILLNESS   Rosita Lemus is a 73 year old female who presents for follow up for diabetes mellitus     Diabetes History:  Diagnosed- around 10 years ago   Patient has not had hospitalizations for blood sugar issues    Prior glycohemoglobin were: 9.5% 4/2023; 7.2% 7/2023; 7.0% 12/2023; 7.1% 4/2024; 6.9% 5/2024; 6.2% POC today     Dietary compliance: Fair- has been trying to decrease intake of soda in the past several months      Recall:  Breakfast- Chick erika sandwich and a few fries and small fruit cup   Lunch- sometimes skips, typically leftovers from dinner meal   Dinner- Protein, with potato, sometimes vegetable   Snack-chips   Beverages- water, occ orange crush or tea - unsweetened, or occ. Small can ginger ale or sprite     Exercise: Yes- more walking and stairs more      Polyuria/polydipsia: No  Blurred vision: No - blurry vision in R eye- macular hole     Episodes of hypoglycemia: Denies symptoms, has not been checking sugars recently     Blood Glucose:    - Not checking     Current DM Regimen:  Jardiance- 10mg PO daily   Metformin- 500 mg PO daily   Januvia 50 mg daily    Modifying factors:  Medication adherence: yes   Recent steroids, illness or infections: no       REVIEW OF SYSTEMS  Eyes: Diabetic retinopathy present: Yes- mild NPDR            Most recent visit to eye doctor in last 12 months: Yes- Dr. Richmond last visit 7/11/24    CV: Cardiovascular disease present: Yes- s/p stent x 2 12/2021, CHF- follows with HF clinic.          Hypertension present: Yes         Hyperlipidemia present: Yes         Peripheral Vascular Disease present: No    : Nephropathy present: Yes- CKD-follows with Dr. Whitehead    Neuro: Neuropathy present: No, denies symptoms     Skin: Infection or ulceration: No    Osteoporosis: No    Thyroid disease: No      Medications:     Current Outpatient Medications:     losartan  100 MG Oral Tab, Take 1 tablet (100 mg total) by mouth daily., Disp: , Rfl:     furosemide 40 MG Oral Tab, Take 0.5 tablets (20 mg total) by mouth 3 (three) times a week. Monday, Wednesday and Fridays and as directed, Disp: 30 tablet, Rfl: 3    metFORMIN 500 MG Oral Tab, Take 1 tablet (500 mg total) by mouth daily with breakfast., Disp: , Rfl:     empagliflozin 10 MG Oral Tab, Take 0.5 tablets (5 mg total) by mouth daily., Disp: , Rfl:     Na Sulfate-K Sulfate-Mg Sulf (SUPREP BOWEL PREP KIT) 17.5-3.13-1.6 GM/177ML Oral Solution, Take prep as directed by gastro office. May substitute with Trilyte/generic equivalent if needed., Disp: 1 each, Rfl: 0    aspirin 81 MG Oral Tab EC, Take 1 tablet (81 mg total) by mouth daily. Do not take until cleared by HF clinic when CBC is reviewed on Monday 8/26, Disp: 30 tablet, Rfl: 0    famotidine 10 MG Oral Tab, Take 1 tablet (10 mg total) by mouth daily as needed for Heartburn., Disp: , Rfl:     Elastic Bandages & Supports (MEDICAL COMPRESSION STOCKINGS) Does not apply Misc, 1 each daily. Knee high 15-20 mmhg wear during the day off at night, Disp: 1 each, Rfl: 0    amLODIPine 10 MG Oral Tab, Take 1 tablet (10 mg total) by mouth at bedtime., Disp: , Rfl:     ROSUVASTATIN 40 MG Oral Tab, TAKE ONE TABLET BY MOUTH DAILY, Disp: 90 tablet, Rfl: 0    ACCU-CHEK SOFTCLIX LANCETS Does not apply Misc, TEST BLOOD GLUCOSE ONCE DAILY, Disp: 100 each, Rfl: 3    CARVEDILOL 25 MG Oral Tab, TAKE ONE TABLET BY MOUTH TWICE A DAY WITH MEALS, Disp: 180 tablet, Rfl: 1    SITagliptin Phosphate (JANUVIA) 50 MG Oral Tab, Take 1 tablet (50 mg total) by mouth daily. (Patient taking differently: Take 2 tablets (100 mg total) by mouth Noon.), Disp: 90 tablet, Rfl: 1    Glucose Blood (ACCU-CHEK MILLA PLUS) In Vitro Strip, 2 (two) times a day., Disp: , Rfl:      Allergies:   Allergies   Allergen Reactions    Flu Virus Vaccine OTHER (SEE COMMENTS)     Other reaction(s): Propensity to adverse reactions to drug     Sulfa Antibiotics OTHER (SEE COMMENTS)    Other OTHER (SEE COMMENTS)     wool       Social History:   Social History     Socioeconomic History    Marital status: Single   Tobacco Use    Smoking status: Never    Smokeless tobacco: Never   Vaping Use    Vaping status: Never Used   Substance and Sexual Activity    Alcohol use: Not Currently    Drug use: Never       Medical History:   Past Medical History:    Diabetes (HCC)    Diabetes mellitus (HCC)    Diverticulitis    Essential hypertension    High blood pressure    High cholesterol    Hyperlipidemia    Sleep apnea       Surgical history:   Past Surgical History:   Procedure Laterality Date    Angiogram N/A 01/07/2022    Cataract extraction w/  intraocular lens implant Right 2017    Dr. Griffin    Colonoscopy N/A 6/5/2020    Procedure: COLONOSCOPY;  Surgeon: Ye Ling MD;  Location: [facility] ENDOSCOPY    Colonoscopy N/A 8/21/2024    Procedure: COLONOSCOPY;  Surgeon: Vita Adams MD;  Location: [facility] ENDOSCOPY    Colonoscopy screening - referral N/A 12/27/2022    Procedure: COLONOSCOPY-SCREENING;  Surgeon: Ye Ling MD;  Location: [facility] ENDOSCOPY    Hysterectomy      Vit for macular hole Right 2016    surgery for macular hole in 2016- Dr. Ross?    Yag capsulotomy - od - right eye Right 2017    Dr. Griffin       PHYSICAL EXAM  Vitals:    10/15/24 0956   BP: 146/79   Pulse: 57   Weight: 233 lb (105.7 kg)   Height: 5' 9\" (1.753 m)       General Appearance:  alert, well developed, in no acute distress  Eyes:  normal conjunctivae, sclera, and normal pupils  Neck: Trachea midline: Normal  Back: no kyphosis or back tenderness  Lymph Nodes:  No abnormal nodes noted  Musculoskeletal:  normal muscle strength and tone  Skin:  normal moisture and skin texture  Hair & Nails:  normal scalp hair     Hematologic:  no excessive bruising  Neuro:  sensory grossly intact and motor grossly intact.  Psychiatric:  oriented to time, self, and place  Nutritional:  no  abnormal weight gain or loss      ASSESSMENT/PLAN:    Diabetes mellitus type 2 controlled  -HgA1c- 6.2% POC today   -Reviewed ABC's of diabetes   - Reviewed pathogenesis of diabetes.   - Reviewed importance of good glycemic control to prevent microvascular and macrovascular complications including nephropathy, neuropathy, retinopathy, and cardiovascular disease.  - Reviewed importance of SBGM- check glucose 2 times daily   - Reviewed target glucose goals for patient  fasting and <180 post prandially   - Reviewed importance of following diabetic diet- recommended 135 grams of CHO per day or 45 grams per meal.   - Provided patient education materials    - Continue Jardiance 10mg PO daily. Reviewed side effects and risks vs benefits of medication. Reviewed importance of staying well hydrated on medication     -Continue Metformin 500mg PO daily - eGFR 39, 4/2/24 continue lower dose with decreased kidney function.     -Continue Januvia  50mg PO daily. Decreased at last visit given decline in kidney function. eGFR- 39 4/2/24 but now improved to 61, 9/2024    - BP elevated - normal on repeat  - lipids at goal 8/2024- on rosuvastatin 40mg daily   -+ nephropathy- CKD- following with nephrology  - UTD with optho- reviewed importance of optho follow up- last visit 7/11/24  - foot exam 7/2024  - Reviewed low CHO diet today  - Check sugars 1-2 times daily- alternating fasting and post prandial- Call  office if sugars <80 or persistently >180.     RTC in 3 months   10/15/2024  MATHIEU Agustin    A total of 30 minutes was spent on obtaining history, reviewing pertinent imaging/labs and specialists notes, evaluating patient, providing multiple treatment options, reinforcing diet/exercise and compliance, and completing documentation.

## 2024-10-16 ENCOUNTER — NURSE ONLY (OUTPATIENT)
Dept: CARDIOLOGY CLINIC | Facility: HOSPITAL | Age: 74
End: 2024-10-16
Attending: NURSE PRACTITIONER
Payer: MEDICARE

## 2024-10-16 DIAGNOSIS — I50.33 ACUTE ON CHRONIC HEART FAILURE WITH PRESERVED EJECTION FRACTION (HFPEF) (HCC): Primary | ICD-10-CM

## 2024-10-16 PROCEDURE — 93264 REM MNTR WRLS P-ART PRS SNR: CPT | Performed by: NURSE PRACTITIONER

## 2024-11-14 DIAGNOSIS — I50.33 ACUTE ON CHRONIC HEART FAILURE WITH PRESERVED EJECTION FRACTION (HFPEF) (HCC): Primary | ICD-10-CM

## 2024-11-16 ENCOUNTER — LAB ENCOUNTER (OUTPATIENT)
Dept: LAB | Facility: HOSPITAL | Age: 74
End: 2024-11-16
Attending: NURSE PRACTITIONER
Payer: MEDICARE

## 2024-11-16 DIAGNOSIS — I50.32 CHRONIC HEART FAILURE WITH PRESERVED EJECTION FRACTION (HFPEF) (HCC): ICD-10-CM

## 2024-11-16 DIAGNOSIS — I50.33 ACUTE ON CHRONIC HEART FAILURE WITH PRESERVED EJECTION FRACTION (HFPEF) (HCC): ICD-10-CM

## 2024-11-16 LAB
ANION GAP SERPL CALC-SCNC: 4 MMOL/L (ref 0–18)
BUN BLD-MCNC: 16 MG/DL (ref 9–23)
BUN/CREAT SERPL: 16.5 (ref 10–20)
CALCIUM BLD-MCNC: 9.7 MG/DL (ref 8.7–10.4)
CHLORIDE SERPL-SCNC: 110 MMOL/L (ref 98–112)
CO2 SERPL-SCNC: 30 MMOL/L (ref 21–32)
CREAT BLD-MCNC: 0.97 MG/DL
DEPRECATED HBV CORE AB SER IA-ACNC: 20 NG/ML
DEPRECATED RDW RBC AUTO: 48 FL (ref 35.1–46.3)
EGFRCR SERPLBLD CKD-EPI 2021: 62 ML/MIN/1.73M2 (ref 60–?)
ERYTHROCYTE [DISTWIDTH] IN BLOOD BY AUTOMATED COUNT: 14.7 % (ref 11–15)
FASTING STATUS PATIENT QL REPORTED: NO
GLUCOSE BLD-MCNC: 113 MG/DL (ref 70–99)
HCT VFR BLD AUTO: 40.6 %
HGB BLD-MCNC: 12.9 G/DL
IRON SATN MFR SERPL: 13 %
IRON SERPL-MCNC: 48 UG/DL
MCH RBC QN AUTO: 28.2 PG (ref 26–34)
MCHC RBC AUTO-ENTMCNC: 31.8 G/DL (ref 31–37)
MCV RBC AUTO: 88.8 FL
NT-PROBNP SERPL-MCNC: 294 PG/ML (ref ?–125)
OSMOLALITY SERPL CALC.SUM OF ELEC: 300 MOSM/KG (ref 275–295)
PLATELET # BLD AUTO: 188 10(3)UL (ref 150–450)
POTASSIUM SERPL-SCNC: 3.7 MMOL/L (ref 3.5–5.1)
RBC # BLD AUTO: 4.57 X10(6)UL
SODIUM SERPL-SCNC: 144 MMOL/L (ref 136–145)
TIBC SERPL-MCNC: 364 UG/DL (ref 250–425)
TRANSFERRIN SERPL-MCNC: 244 MG/DL (ref 250–380)
WBC # BLD AUTO: 4.6 X10(3) UL (ref 4–11)

## 2024-11-16 PROCEDURE — 80048 BASIC METABOLIC PNL TOTAL CA: CPT

## 2024-11-16 PROCEDURE — 85027 COMPLETE CBC AUTOMATED: CPT

## 2024-11-16 PROCEDURE — 84466 ASSAY OF TRANSFERRIN: CPT | Performed by: NURSE PRACTITIONER

## 2024-11-16 PROCEDURE — 83540 ASSAY OF IRON: CPT | Performed by: NURSE PRACTITIONER

## 2024-11-16 PROCEDURE — 83880 ASSAY OF NATRIURETIC PEPTIDE: CPT

## 2024-11-16 PROCEDURE — 36415 COLL VENOUS BLD VENIPUNCTURE: CPT

## 2024-11-16 PROCEDURE — 82728 ASSAY OF FERRITIN: CPT

## 2024-11-18 ENCOUNTER — OFFICE VISIT (OUTPATIENT)
Dept: CARDIOLOGY CLINIC | Facility: HOSPITAL | Age: 74
End: 2024-11-18
Attending: NURSE PRACTITIONER
Payer: MEDICARE

## 2024-11-18 VITALS
DIASTOLIC BLOOD PRESSURE: 64 MMHG | BODY MASS INDEX: 35 KG/M2 | SYSTOLIC BLOOD PRESSURE: 128 MMHG | OXYGEN SATURATION: 95 % | HEART RATE: 63 BPM | WEIGHT: 236 LBS

## 2024-11-18 DIAGNOSIS — E11.22 CKD STAGE 3 DUE TO TYPE 2 DIABETES MELLITUS (HCC): ICD-10-CM

## 2024-11-18 DIAGNOSIS — I50.33 ACUTE ON CHRONIC HEART FAILURE WITH PRESERVED EJECTION FRACTION (HFPEF) (HCC): Primary | ICD-10-CM

## 2024-11-18 DIAGNOSIS — D50.0 IRON DEFICIENCY ANEMIA DUE TO CHRONIC BLOOD LOSS: ICD-10-CM

## 2024-11-18 DIAGNOSIS — N18.30 CKD STAGE 3 DUE TO TYPE 2 DIABETES MELLITUS (HCC): ICD-10-CM

## 2024-11-18 DIAGNOSIS — I25.10 CORONARY ARTERY DISEASE INVOLVING NATIVE CORONARY ARTERY OF NATIVE HEART WITHOUT ANGINA PECTORIS: ICD-10-CM

## 2024-11-18 DIAGNOSIS — G47.33 OSA (OBSTRUCTIVE SLEEP APNEA): ICD-10-CM

## 2024-11-18 PROCEDURE — 99215 OFFICE O/P EST HI 40 MIN: CPT

## 2024-11-18 PROCEDURE — 96374 THER/PROPH/DIAG INJ IV PUSH: CPT

## 2024-11-18 PROCEDURE — 93264 REM MNTR WRLS P-ART PRS SNR: CPT | Performed by: NURSE PRACTITIONER

## 2024-11-18 NOTE — PROGRESS NOTES
Rosita ambulates into clinic with cane, also ambulates without cane.  Subjective: Denies lightheadedness, chest pain, palpitations or shortness of breath. Bilateral low extremity swelling present R>L.    Weight:  Today 236 lb   Home NONE Last visit 246 lb  Labs completed : at lab BMP/PRO  Device:  CardioMEMS Interrogation YES reviewed reading 9 & 10 GOAL 11-17  IV placed:  no  Measurements: RLE calf: 16.25\" ankle: 12.5\"  LLE calf: 16.5\" ankle: 11\"  Medications given VENOFER 200 mg IV PUSH.    Patient and medication assessed. Discussed with APN. Treatments completed leg measurements, cardioMEMS interrogation and review, IV push medication administration,  repeat blood pressure stable.  Medications given VENOFER 200 mg IV PUSH. Extensive education of disease management including monitoring sodium in diet over the holidays.  Reviewed AVS instructions. Patient verbalized understanding.

## 2024-11-18 NOTE — PATIENT INSTRUCTIONS
Continue all your medications as prescribed     Follow up with Dr. Saldana on 4/16/25 8 am     Follow up with the specialty care clinic in 1-2 weeks.     Call if you are having shortness of breath, cough, wheezing, chest pain, dizziness, lightheadedness, heart racing, palpitations, swelling, rapid weight gain, fatigue, weakness, fever or chills.  Call 911 with severe shortness of breath, chest pain or chest pressure not improved after 15 minutes of rest or if feeling faint,  passing out or having a fall     Weigh yourself daily in the morning before breakfast, call if gaining 3 lbs or more overnight or more than 5 lbs in one week.    Keep daily fluids at 48-64 ounces per day (1.5-2 liters of liquid or 6-8 , 8 oz glasses of liquid)    Heart healthy diet. Limit sodium to  2000 mg daily. Common high sodium foods include frozen dinners, soups (not homemade), some cereal, vegetable juice, canned vegetables, lunch meats, processed meats like hotdogs, sausage, bruce, pepperoni, soy sauce, pre-packaged rice or potatoes. Please remember to read nutrition labels for sodium content.   www.healthyeating.nhlbi.nih.gov    Exercise daily as tolerated, with goal of doing moderate aerobic exercise like walking for about 30 minutes 5 days per week. Start by walking at a slow to moderate pace for 3-5 minutes 2-3 times a day. Pace your activity to prevent shortness of breath or fatigue. Stop exercise if you develop chest pain, lightheadedness, or significant shortness of breath    Always carry a copy of your current medication list with you

## 2024-11-18 NOTE — PROGRESS NOTES
CARDIOMEMS REMOTE MONTHLY BILLING TEMPLATE        Remote Hemodynamic Device Report Summary - Remote Visit    Patient Name: Rosita Lemus MRN: [id number] : 1950         Provider:   Amanda MELISSA  Cardiologist: Dr. Narseh Saldana                                    Diagnosis: HFpEF     Reporting period: 10/17/2024 - 2024  Cardiomems Goal: PAD goal range 12-18 mmHg > 11-17.     Implant date 23; PA 36/6, m 20, PCWP 10, DPG -4       Current readings:   PAD 9 -10 mmHg on 24  PAD readings ranging 7-13  mmHg in last 30 days.      Remote monitoring documentation:   Reviewed above readings weekly. Euvolemic on lower dose diuretics, adjusting diuretics prn.   Plan:  Continue PA pressure monitoring weekly, continued patient assessment and communication per abbot and Epic messaging, phone or clinic visit to maintain optimal range of PA pressures  AMANDA ALCARAZ NP,   2024

## 2024-11-18 NOTE — PROGRESS NOTES
Specialty Care Clinic    Rosita Lemus Patient Status:  No patient class for patient encounter    1950 MRN [id number]   Location [facility] SPECIALTY CARE CLINIC MD Dr. Shana Guzmán is a 73 year old female who presents to clinic for assessment and management for  chronic heart failure.    Admitted -24 with rectal bleeding. Hgb down to 8.3, at 8.8 upon dc. S/p colonoscopy without active bleeding. Asa held, to resume if HGB stable. Lasix held with hypovolemia. Lisinopril dcd.    Admitted for elective cardiomems procedure 23.  RA 5, RV 39/7, PA 36/6 mean 20, PCW 10, CO 5 / CI 2.2, SVR 1486 /   Hypovolemic. Reduce diuretics to avoid LINDA. Started on plavix 75 mg daily.        Problem List:  Chronic HFpEF, NYHA class III, Stage C, EF 55-60%  Cardiomems 23, Goal range 12-18 mmHg   CKD   CAD with PCI, FAUZIA of LAD x2 2022  DM type 2, on insulin  HTN  HLD    Subjective:  Here on her own  Feeling good  Eating well, appetite is good, eating less  Denies logan, cp, dizziness, orthopnea with 2 pillows, occasional bloating, chronic LE edema at baseline wearing compression stockings,   Home wt down to 238-240  Switched from lisinopril to losartan  Denies any sign of blood in stool, no bright red, dark kathy or black stool.     Review of Systems:  Constitutional: negative for chills or fever  Respiratory:  negative for cough, hemoptysis and wheezing  Cardiovascular: negative for chest pain, exertional chest pressure/discomfort, near-syncope, orthopnea and palpitations  Gastrointestinal: negative for abdominal pain, diarrhea, melena, nausea and vomiting  Hematologic/lymphatic: negative  Musculoskeletal: negative for muscle weakness and myalgias    Objective:  Lab Results   Component Value Date/Time    WBC 4.6 2024 06:48 AM    HGB 12.9 2024 06:48 AM    HCT 40.6 2024 06:48 AM    .0 2024 06:48 AM    CREATSERUM 0.97  11/16/2024 06:48 AM    BUN 16 11/16/2024 06:48 AM     11/16/2024 06:48 AM    K 3.7 11/16/2024 06:48 AM     11/16/2024 06:48 AM    CO2 30.0 11/16/2024 06:48 AM     (H) 11/16/2024 06:48 AM    CA 9.7 11/16/2024 06:48 AM    ALB 3.6 04/20/2022 08:49 AM    ALB 4.00 04/20/2022 08:49 AM    ALKPHO 88 04/20/2022 08:49 AM    BILT 0.3 04/20/2022 08:49 AM    TP 7.2 04/20/2022 08:49 AM    TP 7.3 04/20/2022 08:49 AM    AST 13 (L) 04/20/2022 08:49 AM    ALT 18 04/20/2022 08:49 AM    PTT 21.9 (L) 08/21/2024 08:58 AM    INR 1.12 08/21/2024 08:58 AM    PTP 15.1 (H) 08/21/2024 08:58 AM    T4F 1.0 12/05/2021 07:42 AM    TSH 2.170 12/05/2021 07:42 AM    ESRML 20 04/20/2022 08:49 AM    CRP <0.29 04/20/2022 08:49 AM    PHOS 3.3 04/20/2022 08:49 AM    B12 692 10/14/2021 06:32 AM    PGLU 80 08/22/2024 05:09 PM       Labs drawn: NA. BMP, CBC, iron studies, pro BNP results from 11/16/24 reviewed with patient, independently interpreted results    /64 (BP Location: Left arm, Patient Position: Sitting, Cuff Size: large)   Pulse 63   Wt 236 lb (107 kg)   SpO2 95%   BMI 34.85 kg/m²       Date  Clinic wt Home wt MCI wt DC wt IV med  cardiomems   11/8/23   235      12/28/23 238        1/3/24   236      2/6/24 240 238    12 mmHg   6/4/24 239     13 mmHg    8/22/24    240  9 mmHg   8/26/24 246 246    14 mmhg   11/18/24 236 238 ?   Venofer 200 mg IV 9-10 mmHg     General appearance: alert, appears stated age and cooperative  Neck: no JVD at 90 degrees  Lungs: clear to auscultation bilaterally  Heart: S1, S2 normal, + murmur, no click, rub or gallop, regular rate and rhythm- HR 50 bpm   Abdomen: soft, non-tender; bowel sounds normal; no masses,  mod abdominal distension  Extremities: extremities normal, atraumatic, no cyanosis, +2 stephanie LE edema,  from below knees to pedals  Pulses: 2+ and symmetric  Neurologic: Grossly normal  Measurements:   11/18/24: RLE calf: 16.25\" ankle: 12.5\"  LLE calf: 16.5\" ankle: 11\"   8/26/24: RLE:  Calf: 16.5 lb Ankle 12.5\"  LLE Calf: 17\" Ankle: 11\"   24:RLE Calf: 16.75\" Ankle: 12.75\"  LLE Calf: 17.75\" Ankle: 12   24: RLE Calf: 16.5\" Ankle: 13\"    LLE Calf: 17.5\" Ankle: 11\"     Diagnostics:    Rhythm: 24 SR 60 bpm  EK24 sinus taryn 47 bpm.    Echocardiogram: 2023, EF 55-60%, no wma, + diastolic dysfunction, mod stephanie atrial dilation, mild MR, mild/mod TR, RA 8 mmHg. PA 35 mmhg  Heart cath: 2022 FAUZIA X2 to LAD , PCI   RHC: 23  RA 5  RV 39/7  PA 36/6 mean 20  PCW 10     CO 5 / CI 2.2  SVR 1486 /     Recommendations:  Reduce diuretics to avoid LINDA     Devices:   [  ]ICD  [ ]BIV-ICD  [  ]PPM  [  ]Life Vest  [x  ]CardioMEMS    Assessment:  Chronic HFp EF, NYHA class III, stage C  -EF 55-60%  -diuretics: furosemide 20 mg 3 times weekly  -GDMT: coreg, losartan, jardiance  -PAD 9-10 mmHg, ranging 8-11 mmHg in last week. (PAD goal 12-18 )  -weight down 10 lbs in the last month or more, mild chronic stephanie LE edema,  no logan, cp or dizziness  - BP normal today   -Renal function normal, bun/cr:16/0.97,  K 3.7  -pro  down today ()   - (24) <-(6/3/24) 92  <-100   -near euvolemic today with mild chronic stephanie LE edema, BP and renal function normal after switching to losartan  -HGB nml, with low iron sat and ferritin, begin venofer 200 mg weekly x3 doses today  -follow up with [facility] in 1 week   -follow up with Dr. Saldana 2025      Hx GI bleed and hx of iron deficiency anemia   -24-bloody stools, HGB 13 -> 10-> 8.3 -> 8.8  - iron counts still low  -s/-p venofer infusions weekly x3, completed 24  -repeat iron counts today (24) iron sat 13%, ferritin 20  -HGB 12.9 today <- 9.2  -no active bleeding on colonoscopy  -no recurrent report rectal bleeding or melena  -venofer 200 mg IV given today, #1 of 3       CAD with PCI of LAD 2022  -back on asa, con't on statin    CKD stage IIIa-->II  -renal function normal/stable  -eGFR 62  - follows with Dr. Whitehead      HTN  -BP normal   -switched to losartan 100 mg daily from lisinopril  -continue amlodipine, coreg, jardiance, furosemide    LILY, not using CPAP  -has not tolerated mask in past   -repeat LILY, starting cpap per Dr. Aguilar -Rodríguez    Plan:     Patient Instructions     Continue all your medications as prescribed     Follow up with Dr. Saldana on 4/16/25 8 am     Follow up with the specialty care clinic in 1-2 weeks.     Call if you are having shortness of breath, cough, wheezing, chest pain, dizziness, lightheadedness, heart racing, palpitations, swelling, rapid weight gain, fatigue, weakness, fever or chills.  Call 911 with severe shortness of breath, chest pain or chest pressure not improved after 15 minutes of rest or if feeling faint,  passing out or having a fall     Weigh yourself daily in the morning before breakfast, call if gaining 3 lbs or more overnight or more than 5 lbs in one week.    Keep daily fluids at 48-64 ounces per day (1.5-2 liters of liquid or 6-8 , 8 oz glasses of liquid)    Heart healthy diet. Limit sodium to  2000 mg daily. Common high sodium foods include frozen dinners, soups (not homemade), some cereal, vegetable juice, canned vegetables, lunch meats, processed meats like hotdogs, sausage, bruce, pepperoni, soy sauce, pre-packaged rice or potatoes. Please remember to read nutrition labels for sodium content.   www.healthyeating.nhlbi.nih.gov    Exercise daily as tolerated, with goal of doing moderate aerobic exercise like walking for about 30 minutes 5 days per week. Start by walking at a slow to moderate pace for 3-5 minutes 2-3 times a day. Pace your activity to prevent shortness of breath or fatigue. Stop exercise if you develop chest pain, lightheadedness, or significant shortness of breath    Always carry a copy of your current medication list with you        Current Outpatient Medications:     losartan 100 MG Oral Tab, Take 1 tablet (100 mg total) by mouth daily., Disp: , Rfl:      furosemide 40 MG Oral Tab, Take 0.5 tablets (20 mg total) by mouth 3 (three) times a week. Monday, Wednesday and Fridays and as directed, Disp: 30 tablet, Rfl: 3    aspirin 81 MG Oral Tab EC, Take 1 tablet (81 mg total) by mouth daily. Do not take until cleared by HF clinic when CBC is reviewed on Monday 8/26, Disp: 30 tablet, Rfl: 0    metFORMIN 500 MG Oral Tab, Take 1 tablet (500 mg total) by mouth daily with breakfast., Disp: , Rfl:     famotidine 10 MG Oral Tab, Take 1 tablet (10 mg total) by mouth daily as needed for Heartburn., Disp: , Rfl:     empagliflozin 10 MG Oral Tab, Take 0.5 tablets (5 mg total) by mouth daily., Disp: , Rfl:     amLODIPine 10 MG Oral Tab, Take 1 tablet (10 mg total) by mouth at bedtime., Disp: , Rfl:     ROSUVASTATIN 40 MG Oral Tab, TAKE ONE TABLET BY MOUTH DAILY, Disp: 90 tablet, Rfl: 0    CARVEDILOL 25 MG Oral Tab, TAKE ONE TABLET BY MOUTH TWICE A DAY WITH MEALS, Disp: 180 tablet, Rfl: 1    SITagliptin Phosphate (JANUVIA) 50 MG Oral Tab, Take 1 tablet (50 mg total) by mouth daily., Disp: 90 tablet, Rfl: 1    Na Sulfate-K Sulfate-Mg Sulf (SUPREP BOWEL PREP KIT) 17.5-3.13-1.6 GM/177ML Oral Solution, Take prep as directed by gastro office. May substitute with Trilyte/generic equivalent if needed., Disp: 1 each, Rfl: 0    Elastic Bandages & Supports (MEDICAL COMPRESSION STOCKINGS) Does not apply Misc, 1 each daily. Knee high 15-20 mmhg wear during the day off at night, Disp: 1 each, Rfl: 0    ACCU-CHEK SOFTCLIX LANCETS Does not apply Misc, TEST BLOOD GLUCOSE ONCE DAILY, Disp: 100 each, Rfl: 3    Glucose Blood (ACCU-CHEK MILLA PLUS) In Vitro Strip, 2 (two) times a day., Disp: , Rfl:     AMANDA ALCARAZ NP  11/18/2024       45 minutes spent on patient education, chart review and documentation:  Patient instructed regarding clinic procedures, hours, purpose of clinic visits, sodium restricted diet, low sodium foods, fluid restrictions, daily weights, medication regimen s/s of heart failure  exacerbation and when to call APN/clinic. Provided patient with  counseling, coordination of care and education given. Patient receptive.

## 2024-11-25 ENCOUNTER — OFFICE VISIT (OUTPATIENT)
Dept: CARDIOLOGY CLINIC | Facility: HOSPITAL | Age: 74
End: 2024-11-25
Attending: NURSE PRACTITIONER
Payer: MEDICARE

## 2024-11-25 VITALS
DIASTOLIC BLOOD PRESSURE: 65 MMHG | BODY MASS INDEX: 35 KG/M2 | WEIGHT: 236.63 LBS | SYSTOLIC BLOOD PRESSURE: 138 MMHG | HEART RATE: 55 BPM

## 2024-11-25 DIAGNOSIS — I50.32 CHRONIC HEART FAILURE WITH PRESERVED EJECTION FRACTION (HFPEF) (HCC): ICD-10-CM

## 2024-11-25 DIAGNOSIS — D50.0 IRON DEFICIENCY ANEMIA DUE TO CHRONIC BLOOD LOSS: Primary | ICD-10-CM

## 2024-11-25 PROCEDURE — 96374 THER/PROPH/DIAG INJ IV PUSH: CPT

## 2024-11-25 PROCEDURE — 99215 OFFICE O/P EST HI 40 MIN: CPT

## 2024-11-25 NOTE — PROGRESS NOTES
Here for RN visit for acute on chronic HFpEF and iron deficiency anemia.   PAD 10 mmhg today, just below normal goal range of 11-17 with mild chronic stephanie LE edema.     Weight stable at 236 lbs.   Denies cp, logan, dizziness, orthopnea, bloating    Given venofer 200 mg IV , #2 of 3 weekly doses.     Continue same medications and follow up with [facility]  in 1 week.     Valentina MELISSA, 11/25/2024

## 2024-11-25 NOTE — PATIENT INSTRUCTIONS
Continue all your medications as prescribed      Follow up with Dr. Saladna on 4/16/25 8 am      Follow up with the specialty care clinic in 1 week for Venofer dose 3 of 3     Call if you are having shortness of breath, cough, wheezing, chest pain, dizziness, lightheadedness, heart racing, palpitations, swelling, rapid weight gain, fatigue, weakness, fever or chills.  Call 911 with severe shortness of breath, chest pain or chest pressure not improved after 15 minutes of rest or if feeling faint,  passing out or having a fall      Weigh yourself daily in the morning before breakfast, call if gaining 3 lbs or more overnight or more than 5 lbs in one week.     Keep daily fluids at 48-64 ounces per day (1.5-2 liters of liquid or 6-8 , 8 oz glasses of liquid)     Heart healthy diet. Limit sodium to  2000 mg daily. Common high sodium foods include frozen dinners, soups (not homemade), some cereal, vegetable juice, canned vegetables, lunch meats, processed meats like hotdogs, sausage, bruce, pepperoni, soy sauce, pre-packaged rice or potatoes. Please remember to read nutrition labels for sodium content.   www.healthyeating.nhlbi.nih.gov     Exercise daily as tolerated, with goal of doing moderate aerobic exercise like walking for about 30 minutes 5 days per week. Start by walking at a slow to moderate pace for 3-5 minutes 2-3 times a day. Pace your activity to prevent shortness of breath or fatigue. Stop exercise if you develop chest pain, lightheadedness, or significant shortness of breath     Always carry a copy of your current medication list with you

## 2024-11-25 NOTE — PROGRESS NOTES
Rosita is here for IV venofer dose 2 of 3. Ambulates into clinic with use of cane.  Subjective: Denies lightheadedness, or dizziness, chest pain or palpitations, swelling or shortness of breath.    Lungs clear  Heart regular  Weight:  Today 236.6 lb   Home 237 Last visit 236 lb  Labs completed: none  Device:  CardioMEMS Interrogation AT HOME, reviewed reading PAD 10 goal 11-17  IV placed:  NO  Measurements DEFERRED  Medications given Venofer 200 mg dose 2 of 3    Patient and medication assessed. Discussed with APN. Treatments completed Cardiomems review, IV medication administration, repeat blood pressure stable.  Medications given Venofer 200 mg IV push. Extensive education of disease management including limiting high.  Reviewed AVS instructions. Patient verbalized understanding.

## 2024-12-02 ENCOUNTER — OFFICE VISIT (OUTPATIENT)
Dept: CARDIOLOGY CLINIC | Facility: HOSPITAL | Age: 74
End: 2024-12-02
Attending: NURSE PRACTITIONER
Payer: MEDICARE

## 2024-12-02 VITALS
SYSTOLIC BLOOD PRESSURE: 139 MMHG | BODY MASS INDEX: 35 KG/M2 | HEART RATE: 52 BPM | WEIGHT: 236.63 LBS | DIASTOLIC BLOOD PRESSURE: 66 MMHG | OXYGEN SATURATION: 100 %

## 2024-12-02 DIAGNOSIS — D50.0 IRON DEFICIENCY ANEMIA DUE TO CHRONIC BLOOD LOSS: Primary | ICD-10-CM

## 2024-12-02 DIAGNOSIS — I50.32 CHRONIC HEART FAILURE WITH PRESERVED EJECTION FRACTION (HFPEF) (HCC): ICD-10-CM

## 2024-12-02 PROCEDURE — 99215 OFFICE O/P EST HI 40 MIN: CPT

## 2024-12-02 PROCEDURE — 96374 THER/PROPH/DIAG INJ IV PUSH: CPT

## 2024-12-02 NOTE — PATIENT INSTRUCTIONS
Continue all your medications as prescribed      Follow up with Dr. Saldana on 4/16/25 8 am      Follow up with the specialty care clinic January 13 at 9am, arrive at 8:30 for blood draw at the lab.      Call if you are having shortness of breath, cough, wheezing, chest pain, dizziness, lightheadedness, heart racing, palpitations, swelling, rapid weight gain, fatigue, weakness, fever or chills.  Call 911 with severe shortness of breath, chest pain or chest pressure not improved after 15 minutes of rest or if feeling faint,  passing out or having a fall      Weigh yourself daily in the morning before breakfast, call if gaining 3 lbs or more overnight or more than 5 lbs in one week.     Keep daily fluids at 48-64 ounces per day (1.5-2 liters of liquid or 6-8 , 8 oz glasses of liquid)     Heart healthy diet. Limit sodium to  2000 mg daily. Common high sodium foods include frozen dinners, soups (not homemade), some cereal, vegetable juice, canned vegetables, lunch meats, processed meats like hotdogs, sausage, bruce, pepperoni, soy sauce, pre-packaged rice or potatoes. Please remember to read nutrition labels for sodium content.   www.healthyeating.nhlbi.nih.gov     Exercise daily as tolerated, with goal of doing moderate aerobic exercise like walking for about 30 minutes 5 days per week. Start by walking at a slow to moderate pace for 3-5 minutes 2-3 times a day. Pace your activity to prevent shortness of breath or fatigue. Stop exercise if you develop chest pain, lightheadedness, or significant shortness of breath     Always carry a copy of your current medication list with you

## 2024-12-02 NOTE — PROGRESS NOTES
Rosita comes into clinic ambulating with cane. Here for venofer dose 3 of 3.  Subjective: Denies lightheadedness, or dizziness, chest pain or palpitations, or shortness of breath. She does report some right ankle swelling.    Heart: regular  Lungs: clear  Weight:  Today 236.6 lb  Home NONE Last visit 236.6 lb  Labs completed: NONE  Device:  CardioMEMS Interrogation AT HOME reviewed reading 8 GOAL 11-17   IV placed:  NO  Measurements:  Deferred    Patient and medication assessed. Discussed with APN. Treatments completed cardioMEMS reviewed, IV medication administration, repeat blood pressure stable.  Medications given Venofer 200 mg IV push. Extensive education of disease management including limiting high sodium foods over the holidays.  Reviewed AVS instructions. Patient verbalized understanding.

## 2024-12-03 NOTE — PROGRESS NOTES
Here for RN visit for acute on chronic HFpEF and iron deficiency anemia.   PAD 8 mmhg today, just below normal goal range of 11-17 with mild chronic stephanie LE edema on low dose daily lasix.     Weight stable at 236 lbs.   Renal function stable   Denies cp, logan, dizziness, orthopnea, bloating    Given venofer 200 mg IV , #3 of 3 weekly doses.     Continue same medications and follow up with [facility]  in 6 weeks.      Valentina Nuñez APN, 12/2/2024

## 2024-12-18 ENCOUNTER — NURSE ONLY (OUTPATIENT)
Dept: CARDIOLOGY CLINIC | Facility: HOSPITAL | Age: 74
End: 2024-12-18
Attending: NURSE PRACTITIONER
Payer: MEDICARE

## 2024-12-18 DIAGNOSIS — I50.32 CHRONIC HEART FAILURE WITH PRESERVED EJECTION FRACTION (HFPEF) (HCC): Primary | ICD-10-CM

## 2024-12-18 PROCEDURE — 93264 REM MNTR WRLS P-ART PRS SNR: CPT | Performed by: NURSE PRACTITIONER

## 2024-12-18 NOTE — PROGRESS NOTES
CARDIOMEMS REMOTE MONTHLY BILLING TEMPLATE        Remote Hemodynamic Device Report Summary - Remote Visit    Patient Name: Rosita Lemus MRN: [id number] : 1950         Provider:   Amanda MELISSA  Cardiologist: Dr. Naresh Saldana                                    Diagnosis: HFpEF     Reporting period: 2024 - 2024  Cardiomems Goal: PAD goal range 12-18 mmHg > 11-17 >8-14.     Implant date 23; PA 36/6, m 20, PCWP 10, DPG -4     Current readings:   PAD 9 mmHg on 24  PAD readings ranging 6-11  mmHg in last 30 days.      Remote monitoring documentation:   Reviewed above readings weekly. Euvolemic on lower dose diuretics, adjusting diuretics prn.   Plan:  Continue PA pressure monitoring weekly, continued patient assessment and communication per abbot and Epic messaging, phone or clinic visit to maintain optimal range of PA pressures  AMANDA ALCARAZ NP,   2024

## 2025-01-07 ENCOUNTER — OFFICE VISIT (OUTPATIENT)
Dept: NEPHROLOGY | Facility: CLINIC | Age: 75
End: 2025-01-07

## 2025-01-07 VITALS
BODY MASS INDEX: 35.47 KG/M2 | HEIGHT: 69.5 IN | WEIGHT: 245 LBS | HEART RATE: 64 BPM | DIASTOLIC BLOOD PRESSURE: 62 MMHG | SYSTOLIC BLOOD PRESSURE: 124 MMHG

## 2025-01-07 DIAGNOSIS — N18.31 STAGE 3A CHRONIC KIDNEY DISEASE (HCC): Primary | ICD-10-CM

## 2025-01-07 PROCEDURE — 3008F BODY MASS INDEX DOCD: CPT | Performed by: INTERNAL MEDICINE

## 2025-01-07 PROCEDURE — 3074F SYST BP LT 130 MM HG: CPT | Performed by: INTERNAL MEDICINE

## 2025-01-07 PROCEDURE — 99214 OFFICE O/P EST MOD 30 MIN: CPT | Performed by: INTERNAL MEDICINE

## 2025-01-07 PROCEDURE — 1159F MED LIST DOCD IN RCRD: CPT | Performed by: INTERNAL MEDICINE

## 2025-01-07 PROCEDURE — 3078F DIAST BP <80 MM HG: CPT | Performed by: INTERNAL MEDICINE

## 2025-01-07 NOTE — PROGRESS NOTES
01/07/25        Patient: Rosita Lemus   YOB: 1950   Date of Visit: 1/7/2025       Dear  Dr. Tomasz MD,      Thank you for referring Rosita Lemus to my practice.  Please find my assessment and plan below.      As you know she is a 74-year-old female with a history of adult onset diabetes mellitus, hypertension, coronary artery disease, congestive heart failure secondary to diastolic dysfunction, status post CardioMEMS who I now had the pleasure of seeing for follow-up of chronic kidney disease stage III.  Patient states that she rarely gets called about her CardioMEMS reading.  If anything they often tend to be on the lower side.  She currently is on furosemide 20 mg 3 times per week.  She also was recently started on CPAP a couple of months ago for sleep apnea.  She is still in the process of getting adjusted to the mass but does admit that she has more energy when she wears it most of the night.  Still has some chronic lower extremity edema but otherwise no chest pain, shortness shortness of breath, GI or urinary tract symptoms.    Physical exam her blood pressure 124/62 with a pulse 64 she weighed 245 pounds.  Her neck was supple without JVD.  Lungs were clear.  Heart revealed a regular rate and rhythm and S4 but no gallops, murmurs or rubs.  Abdomen soft, flat, nontender without organomegaly, masses or bruits.  Extremities reveal 1+ lower extremity edema bilaterally.    Reviewed recent laboratory studies done on August 28, 2024.  Creatinine was normal at 0.81.  Repeat creatinine on December 17, 2024 was 0.90 with an estimated GFR of 68 cc/min.    I therefore reassured the patient and her daughter that overall her renal function has returned back to normal.  She is doing well from a cardiac standpoint and if anything her CardioMEMS readings are on the lower side.  As result diuretics have been decreased and renal function is normalized.  I told her if the heart failure nurses can send me  copies of her laboratory studies that I would review but if her renal remains normal I would not need to see her for follow-up unless you felt it was clinically indicated.  She knows to follow low-salt diet carefully.  Avoid nonsteroidals.  If lower extremity edema continues to be a problem may benefit from being seen by the lymphedema clinic.    Thank you again for allowing me to participate in the care of your patient.  If you have any questions please feel free to call.               Sincerely,   Bryan Whitehead MD   [facility], [facility]  [address]  [facility] [address]    Document electronically generated by:  Bryan Whitehead MD

## 2025-01-09 DIAGNOSIS — I50.32 CHRONIC HEART FAILURE WITH PRESERVED EJECTION FRACTION (HFPEF) (HCC): Primary | ICD-10-CM

## 2025-01-10 ENCOUNTER — LAB ENCOUNTER (OUTPATIENT)
Dept: LAB | Facility: HOSPITAL | Age: 75
End: 2025-01-10
Attending: NURSE PRACTITIONER
Payer: MEDICARE

## 2025-01-10 DIAGNOSIS — I50.32 CHRONIC HEART FAILURE WITH PRESERVED EJECTION FRACTION (HFPEF) (HCC): ICD-10-CM

## 2025-01-10 LAB
ANION GAP SERPL CALC-SCNC: 7 MMOL/L (ref 0–18)
BUN BLD-MCNC: 14 MG/DL (ref 9–23)
BUN/CREAT SERPL: 14.6 (ref 10–20)
CALCIUM BLD-MCNC: 9.7 MG/DL (ref 8.7–10.4)
CHLORIDE SERPL-SCNC: 106 MMOL/L (ref 98–112)
CO2 SERPL-SCNC: 30 MMOL/L (ref 21–32)
CREAT BLD-MCNC: 0.96 MG/DL
EGFRCR SERPLBLD CKD-EPI 2021: 63 ML/MIN/1.73M2 (ref 60–?)
FASTING STATUS PATIENT QL REPORTED: YES
GLUCOSE BLD-MCNC: 127 MG/DL (ref 70–99)
NT-PROBNP SERPL-MCNC: 241 PG/ML (ref ?–125)
OSMOLALITY SERPL CALC.SUM OF ELEC: 298 MOSM/KG (ref 275–295)
POTASSIUM SERPL-SCNC: 4.1 MMOL/L (ref 3.5–5.1)
SODIUM SERPL-SCNC: 143 MMOL/L (ref 136–145)

## 2025-01-10 PROCEDURE — 36415 COLL VENOUS BLD VENIPUNCTURE: CPT

## 2025-01-10 PROCEDURE — 80048 BASIC METABOLIC PNL TOTAL CA: CPT

## 2025-01-10 PROCEDURE — 83880 ASSAY OF NATRIURETIC PEPTIDE: CPT

## 2025-01-13 ENCOUNTER — OFFICE VISIT (OUTPATIENT)
Dept: CARDIOLOGY CLINIC | Facility: HOSPITAL | Age: 75
End: 2025-01-13
Attending: NURSE PRACTITIONER
Payer: MEDICARE

## 2025-01-13 VITALS
HEART RATE: 67 BPM | BODY MASS INDEX: 36 KG/M2 | OXYGEN SATURATION: 100 % | SYSTOLIC BLOOD PRESSURE: 139 MMHG | WEIGHT: 244.69 LBS | DIASTOLIC BLOOD PRESSURE: 63 MMHG

## 2025-01-13 DIAGNOSIS — N18.2 CKD (CHRONIC KIDNEY DISEASE) STAGE 2, GFR 60-89 ML/MIN: ICD-10-CM

## 2025-01-13 DIAGNOSIS — I25.10 CORONARY ARTERY DISEASE INVOLVING NATIVE CORONARY ARTERY OF NATIVE HEART WITHOUT ANGINA PECTORIS: ICD-10-CM

## 2025-01-13 DIAGNOSIS — G47.33 OSA (OBSTRUCTIVE SLEEP APNEA): ICD-10-CM

## 2025-01-13 DIAGNOSIS — D50.0 IRON DEFICIENCY ANEMIA DUE TO CHRONIC BLOOD LOSS: ICD-10-CM

## 2025-01-13 DIAGNOSIS — I50.32 CHRONIC HEART FAILURE WITH PRESERVED EJECTION FRACTION (HFPEF) (HCC): Primary | ICD-10-CM

## 2025-01-13 PROBLEM — D62 ACUTE BLOOD LOSS ANEMIA: Status: RESOLVED | Noted: 2024-08-26 | Resolved: 2025-01-13

## 2025-01-13 PROCEDURE — 99215 OFFICE O/P EST HI 40 MIN: CPT | Performed by: NURSE PRACTITIONER

## 2025-01-13 NOTE — PROGRESS NOTES
Subjective: using C-PAP regularly. Denies chest pain, palpitations, denies shortness of breath, \"maybe a little tired, sometimes.\" Reports feeling bloated sometimes, has been avoiding soda.    Reports she received her flu vaccine from [facility] provider.    Weight:  Today 244.7 lb   Home no Last visit 236.6 lb  Labs completed: at lab BMP/Pro-BNP  Device:  CardioMEMS Interrogation AT HOME reviewed reading PAD 15 GOAL 11-17  IV placed:  no  Measurements:  RLE Calf: 16.75\" Ankle: 11.75\"  LLE Calf: 17.25\" Ankle: 11.5\"    Patient and medication assessed. Discussed with APN. Treatments completed leg measurements.  Medications given NONE. Extensive education of disease management.  Reviewed AVS instructions. Patient verbalized understanding.

## 2025-01-13 NOTE — PROGRESS NOTES
Specialty Care Clinic    Rosita Lemus Patient Status:  No patient class for patient encounter    1950 MRN [id number]   Location [facility] SPECIAL[facility] CARE CLINIC MD Dr. Shana Guzmán is a 74 year old female who presents to clinic for assessment and management for  chronic heart failure.    Admitted -24 with rectal bleeding. Hgb down to 8.3, at 8.8 upon dc. S/p colonoscopy without active bleeding. Asa held, to resume if HGB stable. Lasix held with hypovolemia. Lisinopril dcd.    Admitted for elective cardiomems procedure 23.  RA 5, RV 39/7, PA 36/6 mean 20, PCW 10, CO 5 / CI 2.2, SVR 1486 /   Hypovolemic. Reduce diuretics to avoid LINDA. Started on plavix 75 mg daily.        Problem List:  Chronic HFpEF, NYHA class III, Stage C, EF 55-60%  Cardiomems 23, Goal range 12-18 mmHg   CKD   CAD with PCI, FAUZIA of LAD x2 2022  DM type 2, on insulin  HTN  HLD    Subjective:  Here on her own  Feeling good   Eating well.   Home weight is stable at home ranging 238-240 #.  Did not take furosemide today   Denies logan, cp, dizziness, or orthopnea with 2 pillows.   No logan walking 400 feet or up 5-8 stairs at home   Occasional bloating, chronic LE edema at baseline wearing compression stockings,   Using cpap for the last 2 months.     Review of Systems:  Constitutional: negative for chills or fever  Respiratory:  negative for cough, hemoptysis and wheezing  Cardiovascular: negative for chest pain, exertional chest pressure/discomfort, near-syncope, orthopnea and palpitations  Gastrointestinal: negative for abdominal pain, diarrhea, melena, nausea and vomiting  Hematologic/lymphatic: negative  Musculoskeletal: negative for muscle weakness, chronic R knee pain     Objective:  Lab Results   Component Value Date/Time    WBC 4.6 2024 06:48 AM    HGB 12.9 2024 06:48 AM    HCT 40.6 2024 06:48 AM    .0 2024 06:48 AM    CREATSERUM  0.96 01/10/2025 07:08 AM    BUN 14 01/10/2025 07:08 AM     01/10/2025 07:08 AM    K 4.1 01/10/2025 07:08 AM     01/10/2025 07:08 AM    CO2 30.0 01/10/2025 07:08 AM     (H) 01/10/2025 07:08 AM    CA 9.7 01/10/2025 07:08 AM    ALB 3.6 04/20/2022 08:49 AM    ALB 4.00 04/20/2022 08:49 AM    ALKPHO 88 04/20/2022 08:49 AM    BILT 0.3 04/20/2022 08:49 AM    TP 7.2 04/20/2022 08:49 AM    TP 7.3 04/20/2022 08:49 AM    AST 13 (L) 04/20/2022 08:49 AM    ALT 18 04/20/2022 08:49 AM    PTT 21.9 (L) 08/21/2024 08:58 AM    INR 1.12 08/21/2024 08:58 AM    PTP 15.1 (H) 08/21/2024 08:58 AM    T4F 1.0 12/05/2021 07:42 AM    TSH 2.170 12/05/2021 07:42 AM    ESRML 20 04/20/2022 08:49 AM    CRP <0.29 04/20/2022 08:49 AM    PHOS 3.3 04/20/2022 08:49 AM    B12 692 10/14/2021 06:32 AM    PGLU 80 08/22/2024 05:09 PM       Labs drawn 1/10/25: BMP,  results from 11/16/24 reviewed with patient, independently interpreted results    /63 (BP Location: Right arm)   Pulse 67   Wt 244 lb 11.2 oz (111 kg)   SpO2 100%   BMI 35.62 kg/m²       Date  Clinic wt Home wt MCI wt DC wt IV med  cardiomems   11/8/23   235      12/28/23 238        1/3/24   236      2/6/24 240 238    12 mmHg   6/4/24 239     13 mmHg    8/22/24    240  9 mmHg   8/26/24 246 246    14 mmhg   11/18/24 236 238 ?   Venofer 200 mg IV 9-10 mmHg   1/13/25 244 * 240       *heavy clothes and shoes    General appearance: alert, appears stated age and cooperative  Neck: no JVD at 90 degrees  Lungs: clear to auscultation bilaterally  Heart: S1, S2 normal, + murmur, no click, rub or gallop, regular rate and rhythm- HR 50 bpm   Abdomen: soft, non-tender; bowel sounds normal; no masses,  mod abdominal distension  Extremities: extremities normal, atraumatic, no cyanosis, +1-2 stephanie LE edema,  from below knees to pedals  Pulses: 2+ and symmetric  Neurologic: Grossly normal  Measurements:   1/13/25) RLE Calf: 16.75\" Ankle: 11.75\"  LLE Calf: 17.25\" Ankle: 11.5\"   11/18/24:  RLE calf: 16.25\" ankle: 12.5\"  LLE calf: 16.5\" ankle: 11\"   24: RLE: Calf: 16.5 lb Ankle 12.5\"  LLE Calf: 17\" Ankle: 11\"   24:RLE Calf: 16.75\" Ankle: 12.75\"  LLE Calf: 17.75\" Ankle: 12   24: RLE Calf: 16.5\" Ankle: 13\"    LLE Calf: 17.5\" Ankle: 11\"     Diagnostics:  Rhythm: 24 SR 60 bpm  EK24 sinus taryn 47 bpm.    Echocardiogram: 2023, EF 55-60%, no wma, + diastolic dysfunction, mod stephanie atrial dilation, mild MR, mild/mod TR, RA 8 mmHg. PA 35 mmhg  Heart cath: 2022 FAUZIA X2 to LAD , PCI   RHC: 23  RA 5  RV 39/7  PA 36/6 mean 20  PCW 10     CO 5 / CI 2.2  SVR 1486 /     Recommendations:  Reduce diuretics to avoid LINDA     Devices:   [  ]ICD  [ ]BIV-ICD  [  ]PPM  [  ]Life Vest  [x  ]CardioMEMS    Assessment:  Chronic HFp EF, NYHA class III, stage C  -EF 55-60%  -diuretics: furosemide 20 mg 3 times weekly  -GDMT: coreg, losartan, jardiance  -PAD 15 mmHg, ranging 8-15 mmHg in last week. (PAD goal 11-17 )  -venofer 200 mg weekly x3 doses completed 25   -weight is up in clinic 6 lbs with heavy clothes, home wt stable at 238-240 , mild chronic stephanie LE edema,  no logan, cp or dizziness  - BP borderline high normal today , has not taken am furosemide  -Renal function normal, bun/cr:14/0.96<-16/0.97,  K 4.1  -pro  down ()   - (24) <-(6/3/24) 92  <-100   -near euvolemic today with mild chronic stephanie LE edema, BP borderline high normal and renal function  -continue same medications, take furosemide 20 mg dose when getting home today   -follow up with [facility] on 3/3/25 with bmp, pro bnp, cbc and iron studies just before visit   -follow up with Dr. Saldana 2025      Hx GI bleed and hx of iron deficiency anemia   -24-bloody stools, HGB 13 -> 10-> 8.3 -> 8.8  -s/-p venofer infusions weekly x3, completed 24  -repeat iron counts (24) iron sat 13%, ferritin 20  -HGB 12.9 <- 9.2  -no active bleeding on colonoscopy  -no recurrent report rectal bleeding or  yong  -completed venofer 200 mg IV weekly x3 on 12/2/24  - repeat iron counts 2/28/2025      CAD with PCI of LAD 1/2022  -back on asa, con't on statin    CKD stage IIIa-->II  -renal function normal/stable  -eGFR 63  - follows with Dr. Whitehead     HTN  -BP near high normal   -continue amlodipine, coreg, jardiance, losartan, furosemide    LILY  - restarted using cpap for the last 2 months, tolerating well  - following with Dr. Aguilar and Rodríguez piedra    Plan:     Patient Instructions   Continue all your medications as prescribed     Follow up with Dr. Saldana in April     Follow up with the specialty care clinic 3/2/25 with blood tests approximately 2/28/25 with BMP, CBC, pro BNP and iron studies       Call if you are having shortness of breath, cough, wheezing, chest pain, dizziness, lightheadedness, heart racing, palpitations, swelling, rapid weight gain, fatigue, weakness, fever or chills.  Call 911 with severe shortness of breath, chest pain or chest pressure not improved after 15 minutes of rest or if feeling faint,  passing out or having a fall     Weigh yourself daily in the morning before breakfast, call if gaining 3 lbs or more overnight or more than 5 lbs in one week.    Keep daily fluids at 48-64 ounces per day (1.5-2 liters of liquid or 6-8 , 8 oz glasses of liquid)    Heart healthy diet. Limit sodium to  2000 mg daily. Common high sodium foods include frozen dinners, soups (not homemade), some cereal, vegetable juice, canned vegetables, lunch meats, processed meats like hotdogs, sausage, bruce, pepperoni, soy sauce, pre-packaged rice or potatoes. Please remember to read nutrition labels for sodium content.   www.healthyeating.nhlbi.nih.gov    Exercise daily as tolerated, with goal of doing moderate aerobic exercise like walking for about 30 minutes 5 days per week. Start by walking at a slow to moderate pace for 3-5 minutes 2-3 times a day. Pace your activity to prevent shortness of breath or fatigue. Stop  exercise if you develop chest pain, lightheadedness, or significant shortness of breath    Always carry a copy of your current medication list with you        Current Outpatient Medications:     losartan 100 MG Oral Tab, Take 1 tablet (100 mg total) by mouth daily., Disp: , Rfl:     furosemide 40 MG Oral Tab, Take 0.5 tablets (20 mg total) by mouth 3 (three) times a week. Monday, Wednesday and Fridays and as directed, Disp: 30 tablet, Rfl: 3    aspirin 81 MG Oral Tab EC, Take 1 tablet (81 mg total) by mouth daily. Do not take until cleared by HF clinic when CBC is reviewed on Monday 8/26, Disp: 30 tablet, Rfl: 0    metFORMIN 500 MG Oral Tab, Take 1 tablet (500 mg total) by mouth daily with breakfast., Disp: , Rfl:     famotidine 10 MG Oral Tab, Take 1 tablet (10 mg total) by mouth daily as needed for Heartburn., Disp: , Rfl:     empagliflozin 10 MG Oral Tab, Take 0.5 tablets (5 mg total) by mouth daily., Disp: , Rfl:     amLODIPine 10 MG Oral Tab, Take 1 tablet (10 mg total) by mouth at bedtime., Disp: , Rfl:     ROSUVASTATIN 40 MG Oral Tab, TAKE ONE TABLET BY MOUTH DAILY, Disp: 90 tablet, Rfl: 0    CARVEDILOL 25 MG Oral Tab, TAKE ONE TABLET BY MOUTH TWICE A DAY WITH MEALS, Disp: 180 tablet, Rfl: 1    SITagliptin Phosphate (JANUVIA) 50 MG Oral Tab, Take 1 tablet (50 mg total) by mouth daily., Disp: 90 tablet, Rfl: 1    Na Sulfate-K Sulfate-Mg Sulf (SUPREP BOWEL PREP KIT) 17.5-3.13-1.6 GM/177ML Oral Solution, Take prep as directed by gastro office. May substitute with Trilyte/generic equivalent if needed., Disp: 1 each, Rfl: 0    Elastic Bandages & Supports (MEDICAL COMPRESSION STOCKINGS) Does not apply Misc, 1 each daily. Knee high 15-20 mmhg wear during the day off at night, Disp: 1 each, Rfl: 0    ACCU-CHEK SOFTCLIX LANCETS Does not apply Misc, TEST BLOOD GLUCOSE ONCE DAILY, Disp: 100 each, Rfl: 3    Glucose Blood (ACCU-CHEK MILLA PLUS) In Vitro Strip, 2 (two) times a day., Disp: , Rfl:     AMANDA ALCARAZ,  NP  1/13/2025       45 minutes spent on patient education, chart review and documentation:  Patient instructed regarding clinic procedures, hours, purpose of clinic visits, sodium restricted diet, low sodium foods, fluid restrictions, daily weights, medication regimen s/s of heart failure exacerbation and when to call APN/clinic. Provided patient with  counseling, coordination of care and education given. Patient receptive.

## 2025-01-13 NOTE — PATIENT INSTRUCTIONS
Continue all your medications as prescribed     Follow up with Dr. Saldana in April     Follow up with the specialty care clinic 3/2/25 with blood tests approximately 2/28/25 with BMP, CBC, pro BNP and iron studies       Call if you are having shortness of breath, cough, wheezing, chest pain, dizziness, lightheadedness, heart racing, palpitations, swelling, rapid weight gain, fatigue, weakness, fever or chills.  Call 911 with severe shortness of breath, chest pain or chest pressure not improved after 15 minutes of rest or if feeling faint,  passing out or having a fall     Weigh yourself daily in the morning before breakfast, call if gaining 3 lbs or more overnight or more than 5 lbs in one week.    Keep daily fluids at 48-64 ounces per day (1.5-2 liters of liquid or 6-8 , 8 oz glasses of liquid)    Heart healthy diet. Limit sodium to  2000 mg daily. Common high sodium foods include frozen dinners, soups (not homemade), some cereal, vegetable juice, canned vegetables, lunch meats, processed meats like hotdogs, sausage, bruce, pepperoni, soy sauce, pre-packaged rice or potatoes. Please remember to read nutrition labels for sodium content.   www.healthyeating.nhlbi.nih.gov    Exercise daily as tolerated, with goal of doing moderate aerobic exercise like walking for about 30 minutes 5 days per week. Start by walking at a slow to moderate pace for 3-5 minutes 2-3 times a day. Pace your activity to prevent shortness of breath or fatigue. Stop exercise if you develop chest pain, lightheadedness, or significant shortness of breath    Always carry a copy of your current medication list with you

## 2025-01-20 ENCOUNTER — NURSE ONLY (OUTPATIENT)
Dept: CARDIOLOGY CLINIC | Facility: HOSPITAL | Age: 75
End: 2025-01-20
Attending: NURSE PRACTITIONER
Payer: MEDICARE

## 2025-01-20 DIAGNOSIS — I50.32 CHRONIC HEART FAILURE WITH PRESERVED EJECTION FRACTION (HFPEF) (HCC): Primary | ICD-10-CM

## 2025-01-20 PROCEDURE — 93264 REM MNTR WRLS P-ART PRS SNR: CPT | Performed by: NURSE PRACTITIONER

## 2025-01-20 NOTE — PROGRESS NOTES
CARDIOMEMS REMOTE MONTHLY BILLING TEMPLATE        Remote Hemodynamic Device Report Summary - Remote Visit    Patient Name: Rosita Lemus MRN: [id number] : 1950         Provider:   Amanda MELISSA  Cardiologist: Dr. Naresh Saldana                                    Diagnosis: HFpEF     Reporting period: 2024 - 2025  Cardiomems Goal: PAD goal range 12-18 mmHg > 11-17 >8-14.     Implant date 23; PA 36/6, m 20, PCWP 10, DPG -4     Current readings:   PAD 9 mmHg on 25  PAD readings ranging 7-15  mmHg in last 30 days.      Remote monitoring documentation:   Reviewed above readings weekly. Euvolemic on lower dose diuretics, adjusting diuretics prn.   Plan:  Continue PA pressure monitoring weekly, continued patient assessment and communication per abbot and Epic messaging, phone or clinic visit to maintain optimal range of PA pressures  AMANDA ALCARAZ NP,   25

## 2025-02-12 ENCOUNTER — TELEPHONE (OUTPATIENT)
Dept: CARDIOLOGY CLINIC | Facility: HOSPITAL | Age: 75
End: 2025-02-12

## 2025-02-12 NOTE — TELEPHONE ENCOUNTER
Rosita called to remind us she will be on a cruise 2/17 to 2/15 and is not bringing her cardioMEMS with her.

## 2025-02-18 NOTE — PROGRESS NOTES
CARDIOMEMS REMOTE MONTHLY BILLING TEMPLATE        Remote Hemodynamic Device Report Summary - Remote Visit    Patient Name: Rosita Lemus MRN: [id number] : 1950         Provider:   Amanda MELISSA  Cardiologist: Dr. Naresh Saldana                                    Diagnosis: HFpEF     Reporting period: 2025 - 2025  Cardiomems Goal: PAD goal range 12-18 mmHg > 11-17 >8-14.     Implant date 23; PA 36/6, m 20, PCWP 10, DPG -4     Current readings:   PAD 13 mmHg on 25  PAD readings ranging 7-14 mmHg in last 30 days.      Remote monitoring documentation:   Reviewed above readings weekly. Euvolemic on lower dose diuretics, adjusting diuretics prn.   Plan:  Continue PA pressure monitoring weekly, continued patient assessment and communication per abbot and Epic messaging, phone or clinic visit to maintain optimal range of PA pressures  AMANDA ALCARAZ NP,   25

## 2025-02-19 ENCOUNTER — NURSE ONLY (OUTPATIENT)
Dept: CARDIOLOGY CLINIC | Facility: HOSPITAL | Age: 75
End: 2025-02-19
Attending: NURSE PRACTITIONER
Payer: MEDICARE

## 2025-02-19 DIAGNOSIS — I50.32 CHRONIC HEART FAILURE WITH PRESERVED EJECTION FRACTION (HFPEF) (HCC): Primary | ICD-10-CM

## 2025-02-19 PROCEDURE — 93264 REM MNTR WRLS P-ART PRS SNR: CPT | Performed by: NURSE PRACTITIONER

## 2025-03-03 ENCOUNTER — TELEPHONE (OUTPATIENT)
Facility: CLINIC | Age: 75
End: 2025-03-03

## 2025-03-07 ENCOUNTER — HOSPITAL ENCOUNTER (OUTPATIENT)
Facility: HOSPITAL | Age: 75
Setting detail: HOSPITAL OUTPATIENT SURGERY
Discharge: HOME | End: 2025-03-07
Attending: INTERNAL MEDICINE | Admitting: INTERNAL MEDICINE
Payer: MEDICARE

## 2025-03-07 ENCOUNTER — ANESTHESIA EVENT (OUTPATIENT)
Dept: ENDOSCOPY | Facility: HOSPITAL | Age: 75
End: 2025-03-07
Payer: MEDICARE

## 2025-03-07 ENCOUNTER — ANESTHESIA (OUTPATIENT)
Dept: ENDOSCOPY | Facility: HOSPITAL | Age: 75
End: 2025-03-07
Payer: MEDICARE

## 2025-03-07 VITALS
DIASTOLIC BLOOD PRESSURE: 61 MMHG | RESPIRATION RATE: 16 BRPM | SYSTOLIC BLOOD PRESSURE: 129 MMHG | HEIGHT: 69.5 IN | HEART RATE: 44 BPM | WEIGHT: 244 LBS | OXYGEN SATURATION: 100 % | BODY MASS INDEX: 35.33 KG/M2

## 2025-03-07 DIAGNOSIS — K63.5 CECAL POLYP: ICD-10-CM

## 2025-03-07 PROBLEM — K64.8 INTERNAL HEMORRHOIDS: Status: ACTIVE | Noted: 2025-03-07

## 2025-03-07 LAB — GLUCOSE BLDC GLUCOMTR-MCNC: 114 MG/DL (ref 70–99)

## 2025-03-07 PROCEDURE — G0105 COLORECTAL SCRN; HI RISK IND: HCPCS | Performed by: INTERNAL MEDICINE

## 2025-03-07 PROCEDURE — 0DJD8ZZ INSPECTION OF LOWER INTESTINAL TRACT, VIA NATURAL OR ARTIFICIAL OPENING ENDOSCOPIC: ICD-10-PCS | Performed by: INTERNAL MEDICINE

## 2025-03-07 RX ORDER — SODIUM CHLORIDE, SODIUM LACTATE, POTASSIUM CHLORIDE, CALCIUM CHLORIDE 600; 310; 30; 20 MG/100ML; MG/100ML; MG/100ML; MG/100ML
INJECTION, SOLUTION INTRAVENOUS CONTINUOUS
Status: DISCONTINUED | OUTPATIENT
Start: 2025-03-07 | End: 2025-03-07

## 2025-03-07 RX ORDER — LIDOCAINE HYDROCHLORIDE 10 MG/ML
INJECTION, SOLUTION EPIDURAL; INFILTRATION; INTRACAUDAL; PERINEURAL AS NEEDED
Status: DISCONTINUED | OUTPATIENT
Start: 2025-03-07 | End: 2025-03-07 | Stop reason: SURG

## 2025-03-07 RX ORDER — NALOXONE HYDROCHLORIDE 0.4 MG/ML
0.08 INJECTION, SOLUTION INTRAMUSCULAR; INTRAVENOUS; SUBCUTANEOUS ONCE AS NEEDED
Status: DISCONTINUED | OUTPATIENT
Start: 2025-03-07 | End: 2025-03-07

## 2025-03-07 RX ADMIN — LIDOCAINE HYDROCHLORIDE 50 MG: 10 INJECTION, SOLUTION EPIDURAL; INFILTRATION; INTRACAUDAL; PERINEURAL at 10:50:00

## 2025-03-07 RX ADMIN — SODIUM CHLORIDE, SODIUM LACTATE, POTASSIUM CHLORIDE, CALCIUM CHLORIDE: 600; 310; 30; 20 INJECTION, SOLUTION INTRAVENOUS at 10:42:00

## 2025-03-07 NOTE — ANESTHESIA PREPROCEDURE EVALUATION
Anesthesia PreOp Note    HPI:     Rosita Lemus is a 74 year old female who presents for preoperative consultation requested by: Ye Ling MD    Date of Surgery: 3/7/2025    Procedure(s):  COLONOSCOPY  Indication: Cecal polyp    Relevant Problems   No relevant active problems       NPO:                         History Review:  Patient Active Problem List    Diagnosis Date Noted    CKD (chronic kidney disease) stage 2, GFR 60-89 ml/min 01/13/2025    Thrombocytopenia 09/16/2024    Coronary artery disease involving native coronary artery of native heart without angina pectoris 08/26/2024    Severe obesity (BMI 35.0-39.9) with comorbidity ([facility]) 07/16/2024    LILY (obstructive sleep apnea) 06/04/2024    Chronic heart failure with preserved ejection fraction (HFpEF) ([facility]) 12/29/2023    Pseudophakia of right eye 03/22/2022    Age-related nuclear cataract of left eye 03/22/2022    Iron deficiency anemia due to chronic blood loss 03/08/2022    S/P angioplasty with stent 03/08/2022    CKD stage 3 due to type 2 diabetes mellitus ([facility]) 03/08/2022    Pulmonary nodule 12/07/2021    Diastolic dysfunction 10/19/2021    Diabetes mellitus type 2 without retinopathy ([facility]) 09/28/2021    Hyperlipidemia 09/28/2021    Primary hypertension 09/28/2021    Vitamin D deficiency 09/28/2021       Past Medical History:    Blood disorder    Congestive heart disease (HCC)    Coronary atherosclerosis    Diabetes (HCC)    Diabetes mellitus ([facility])    Diverticulitis    Essential hypertension    High blood pressure    High cholesterol    Hyperlipidemia    Renal disorder    Sleep apnea    CPAP       Past Surgical History:   Procedure Laterality Date    Angiogram N/A 01/07/2022    Cataract extraction w/  intraocular lens implant Right 2017    Dr. Elias Bender bare metal stent (bms)      Colonoscopy N/A 06/05/2020    Procedure: COLONOSCOPY;  Surgeon: Ye Ling MD;  Location: [facility] ENDOSCOPY    Colonoscopy N/A 08/21/2024     Procedure: COLONOSCOPY;  Surgeon: Vita Adams MD;  Location: [facility] ENDOSCOPY    Colonoscopy screening - referral N/A 12/27/2022    Procedure: COLONOSCOPY-SCREENING;  Surgeon: Ye Ling MD;  Location: [facility] ENDOSCOPY    Hysterectomy      Vit for macular hole Right 2016    surgery for macular hole in 2016- Dr. Ross?    Yag capsulotomy - od - right eye Right 2017    Dr. Griffin       Prescriptions Prior to Admission[1]  Current Medications and Prescriptions Ordered in Epic[2]    Allergies[3]    Family History   Problem Relation Age of Onset    Cancer Father 62        COLON CANCER    Hypertension Mother     Other (Other) Mother         CIRCULATION PROBLEM    Other (CHF) Other     Macular degeneration Neg     Glaucoma Neg      Social History     Socioeconomic History    Marital status: Single   Tobacco Use    Smoking status: Never    Smokeless tobacco: Never   Vaping Use    Vaping status: Never Used   Substance and Sexual Activity    Alcohol use: Not Currently    Drug use: Never       Available pre-op labs reviewed.     Lab Results   Component Value Date     01/10/2025    K 4.1 01/10/2025     01/10/2025    CO2 30.0 01/10/2025    BUN 14 01/10/2025    CREATSERUM 0.96 01/10/2025     (H) 01/10/2025    CA 9.7 01/10/2025          Vital Signs:  Body mass index is 35.52 kg/m².   height is 1.765 m (5' 9.5\") and weight is 110.7 kg (244 lb).   Vitals:    03/04/25 1234   Weight: 110.7 kg (244 lb)   Height: 1.765 m (5' 9.5\")        Anesthesia Evaluation     Patient summary reviewed and Nursing notes reviewed    No history of anesthetic complications   Airway   Mallampati: II  TM distance: >3 FB  Neck ROM: full  Dental          Pulmonary - normal exam    breath sounds clear to auscultation  (+) sleep apnea on CPAP  Cardiovascular   (+) hypertension, CAD, CABG/stent, CHF    ECG reviewed  Rhythm: regular  Rate: normal    Neuro/Psych      GI/Hepatic/Renal      Endo/Other    (+) diabetes mellitus type 2   Abdominal   (+) obese    Abdomen: soft.            PCI LAD with FAUZIA x2 Jan 2022, The stress left ventricular ejection fraction was calculated to be 67% and left ventricular global function is normal. The rest left ventricular cavity is noted to be normal. The rest left ventricular ejection fraction was calculated to be 65% and rest left ventricular global function is normal.     BLOOD GLUCOSE 114     Anesthesia Plan:   ASA:  3  Plan:   MAC  Plan Comments: PATIENT AWAKE AND ALERT.  VSS.  ABLE TO PARTICIPATE IN PRE-PROCEDURE ASSESSMENT.      I have informed Rosita Lemus and/or legal guardian or family member of the nature of the anesthetic plan, benefits, risks including possible dental damage if relevant, major complications, and any alternative forms of anesthetic management.   All of the patient's questions were answered to the best of my ability. The patient desires the anesthetic management as planned.  Katalina Dye CRNA  3/7/2025 10:31 AM  Present on Admission:  **None**           [1]   Medications Prior to Admission   Medication Sig Dispense Refill Last Dose/Taking    Simethicone 250 MG Oral Cap Take by mouth. NIGHT  BEFORE COLONOSCOPY   Taking    losartan 100 MG Oral Tab Take 1 tablet (100 mg total) by mouth daily.   Taking    furosemide 40 MG Oral Tab Take 0.5 tablets (20 mg total) by mouth 3 (three) times a week. Monday, Wednesday and Fridays and as directed 30 tablet 3 Taking    aspirin 81 MG Oral Tab EC Take 1 tablet (81 mg total) by mouth daily. Do not take until cleared by HF clinic when CBC is reviewed on Monday 8/26 30 tablet 0 Taking    metFORMIN 500 MG Oral Tab Take 1 tablet (500 mg total) by mouth daily with breakfast.   Taking    famotidine 10 MG Oral Tab Take 1 tablet (10 mg total) by mouth daily as needed for Heartburn.   Taking As Needed    empagliflozin 10 MG Oral Tab Take 0.5 tablets (5 mg total) by mouth daily.   Taking    amLODIPine 10 MG Oral Tab Take 1 tablet (10 mg total) by mouth  at bedtime.   Taking    ROSUVASTATIN 40 MG Oral Tab TAKE ONE TABLET BY MOUTH DAILY 90 tablet 0 Taking    CARVEDILOL 25 MG Oral Tab TAKE ONE TABLET BY MOUTH TWICE A DAY WITH MEALS 180 tablet 1 Taking    SITagliptin Phosphate (JANUVIA) 50 MG Oral Tab Take 1 tablet (50 mg total) by mouth daily. 90 tablet 1 Taking    Na Sulfate-K Sulfate-Mg Sulf (SUPREP BOWEL PREP KIT) 17.5-3.13-1.6 GM/177ML Oral Solution Take prep as directed by gastro office. May substitute with Trilyte/generic equivalent if needed. 1 each 0     Elastic Bandages & Supports (MEDICAL COMPRESSION STOCKINGS) Does not apply Misc 1 each daily. Knee high 15-20 mmhg wear during the day off at night 1 each 0     ACCU-CHEK SOFTCLIX LANCETS Does not apply Misc TEST BLOOD GLUCOSE ONCE DAILY 100 each 3     Glucose Blood (ACCU-CHEK MILLA PLUS) In Vitro Strip 2 (two) times a day.      [2]   No current Epic-ordered facility-administered medications on file.     No current Epic-ordered outpatient medications on file.   [3]   Allergies  Allergen Reactions    Flu Virus Vaccine OTHER (SEE COMMENTS)     Other reaction(s): Propensity to adverse reactions to drug    Sulfa Antibiotics OTHER (SEE COMMENTS)    Other OTHER (SEE COMMENTS)     wool

## 2025-03-07 NOTE — OPERATIVE REPORT
[facility]    COLONOSCOPY PROCEDURE REPORT     DATE OF PROCEDURE:  3/7/2025     PCP: Fernanda Tolbert MD     PREOPERATIVE DIAGNOSIS:  History high risk adenomatous colon polyp     POSTOPERATIVE DIAGNOSIS:  See impression.     SURGEON:  Ye Ling M.D.       PRESENT ILLNESS: 74-year-old woman with BMI 35.5, history type 2 diabetes coronary artery disease and history of cardiac interventions; hypertension dyslipidemia obstructive sleep apnea; family history of colon cancer in her father apparently in his seventh decade.     She has history of severe colonic diverticulosis and recurrent diverticular bleeds.     She returns for follow-up after inpatient emergent colonoscopy examination 8/21/2024 showed at least 1 small cecal colon polyp.  Good prep quality exam with washing; 4 mm cecal polyp left alone.     Before that, Dr. Valeriano Rahman found a 25-30 mm flat cecal polyp during colonoscopy exam 8/18/2014, likely removed outside facility possibly [facility].  No recurrence found in the cecum on my subsequent colonoscopy examination of 6/5/2020.     My most recent colonoscopy examination 12/27/2022 was also reassuring, no recurrence in the cecum.  She was on Plavix anticoagulation at that point.    SEDATION:    MAC anesthesia provided by the Anesthesia Service.  MAC anesthesia requested due to age 74, multiple medical problems as above; anticipated intolerance of colonoscopy examination under safe doses conscious sedation medications    COLONOSCOPY PROCEDURE:   After the nature and risks of colonoscopy examination under MAC anesthesia were discussed with the patient and all questions answered, informed consent was obtained.  The patient was sedated as above.      Digital rectal exam was performed which showed no masses.  The Olympus pediatric video colonoscope was placed in the patient's rectum and advanced under direct visualization through the entire length of the colon up to the  cecum and terminal ileum.  Retroflex exam performed up the ascending colon to the hepatic flexure.  The cecum was confirmed by landmarks including appendiceal orifice, cecal trifold, ileocecal valve.  Retroflexion was performed in the rectum.    The quality of the prep was excellent.    Estimated blood loss: none/insignificant       COLONOSCOPY FINDINGS:    Thorough exam repeatedly roving around the cecum exploring all aspects, up to and around the ileocecal valve; retroflex exam cecum and ascending colon showed no polyps there.  Tattoo john from previous cecal polypectomy again identified, no recurrence there.  Somewhat lumpy, irregular ileocecal valve with typical small bowel ileal lymphoid polypoid lumps and bumps but no colon polyp.  Unclear if it was the ileocecal valve that prompted description of a polyp.  Likely early 15-12 mm submucosal lipoma ascending colon immediately outside of the ileocecal valve.  Moderate severity colonic diverticulosis ascending and transverse colon;  Severe colonic diverticulosis of the descending and sigmoid colon with numerous compound diverticula.  No inflammatory changes seen; certainly no suggestion of recent or impending bleeding.  Medium sized internal hemorrhoids.    RECOMMENDATIONS:  High fiber diet.  Consideration for repeat colonoscopy examination in 5-7 years as per clinical condition at that time.

## 2025-03-07 NOTE — ANESTHESIA POSTPROCEDURE EVALUATION
Patient: Rosita Lemus    Procedure Summary       Date: 03/07/25 Room / Location: [facility] ENDOSCOPY 03 / [facility] ENDOSCOPY    Anesthesia Start: 1051 Anesthesia Stop: 1132    Procedure: COLONOSCOPY Diagnosis:       Cecal polyp      (DIVERTICULOSIS, HEMORRHOIDS,)    Surgeons: Ye Ling MD Anesthesiologist: Katalina Dye CRNA    Anesthesia Type: MAC ASA Status: 3            Anesthesia Type: MAC    Vitals Value Taken Time   BP 91/51 03/07/25 1130   Temp 97.3   03/07/25 1132   Pulse 55 03/07/25 1132   Resp 13 03/07/25 1132   SpO2 98 % 03/07/25 1132   Vitals shown include unfiled device data.    [facility] AN Post Evaluation:   Patient Evaluated in PACU  Patient Participation: complete - patient cannot participate  Level of Consciousness: lethargic  Pain Score: 0  Pain Management: adequate  Airway Patency:patent  Yes    Nausea/Vomiting: none  Cardiovascular Status: acceptable and blood pressure returned to baseline  Respiratory Status: acceptable  Postoperative Hydration acceptable  Comments: PATIENT TOLERATE THE PROCEDURE WELL. VSS.      Katalina Dye CRNA  3/7/2025 11:32 AM

## 2025-03-07 NOTE — DISCHARGE INSTRUCTIONS
.  .  .  Notes from Dr. Ling:    Same severe \"diverticuLOSIS\" (pockets) of the wall of the colon - very common. You should be provided a handout regarding diet and the possible risk of diverticulitis/infection  Very small internal hemorrhoids only on today's colonoscopy exam.  Those may cause occasional blood streaks but are not a problem.  NO colon polyps today.  Great news.  That finding back in August appears to have been a false alarm.  If you are raw and irritated back there after all of that diarrhea and wiping, three good options to soothe and heal the irritation include Aquaphor ointment, \"Desitin\" or \"A & D\" diaper ointment, or good old-fashioned Vaseline.  All of these soothe and create a protective barrier so that your skin can heal.  .  .  .        Home Care Instructions for Colonoscopy with Sedation    Diet:  - Resume your regular diet as tolerated unless otherwise instructed.  - Start with light meals to minimize bloating.  - Do not drink alcohol today.    Medication:  - If you have questions about resuming your normal medications, please contact your Primary Care Physician.    Activities:  - Take it easy today. Do not return to work today.  - Do not drive today.  - Do not operate any machinery today (including kitchen equipment).    Colonoscopy:  - You may notice some rectal \"spotting\" (a little blood on the toilet tissue) for a day or two after the exam. This is normal.  - If you experience any rectal bleeding (not spotting), persistent tenderness or sharp severe abdominal pains, oral temperature over 100 degrees Fahrenheit, light-headedness or dizziness, or any other problems, contact your doctor.        **If unable to reach your doctor, please go to the [facility] Emergency Room**    - Your referring physician will receive a full report of your examination.  - If you do not hear from your doctor's office within two weeks of your biopsy, please call them for your results.    You  may be able to see your laboratory results in [email or web address] between 4 and 7 business days.  In some cases, your physician may not have viewed the results before they are released to [employer].  If you have questions regarding your results contact the physician who ordered the test/exam by phone or via [employer] by choosing \"Ask a Medical Question.\"

## 2025-03-07 NOTE — H&P
History & Physical Examination    Patient Name: Rosita Lemus  MRN: [id number]  CSN: [id number]  YOB: 1950    Diagnosis: History high risk adenomatous colon polyp    PRESENT ILLNESS: 74-year-old woman with BMI 35.5, history type 2 diabetes coronary artery disease and history of cardiac interventions; hypertension dyslipidemia obstructive sleep apnea; family history of colon cancer in her father apparently in his seventh decade.    She has history of severe colonic diverticulosis and recurrent diverticular bleeds.    She returns for follow-up after inpatient emergent colonoscopy examination 8/21/2024 showed at least 1 small cecal colon polyp.  Good prep quality exam with washing; 4 mm cecal polyp left alone.    Before that, Dr. Valeriano Rahman found a 25-30 mm flat cecal polyp during colonoscopy exam 8/18/2014, likely removed outside facility [facility].  No recurrence found in the cecum on my subsequent colonoscopy examination of 6/5/2020.    My most recent colonoscopy examination 12/27/2022 was also reassuring, no recurrence in the cecum.  She was on Plavix anticoagulation at that point.    BMI Readings from Last 1 Encounters:   03/04/25 35.52 kg/m²         Prescriptions Prior to Admission[1]  No current facility-administered medications for this encounter.       Allergies: Allergies[2]    Past Medical History:    Blood disorder    Congestive heart disease (HCC)    Coronary atherosclerosis    Diabetes (HCC)    Diabetes mellitus (HCC)    Diverticulitis    Essential hypertension    High blood pressure    High cholesterol    Hyperlipidemia    Renal disorder    Sleep apnea    CPAP     Past Surgical History:   Procedure Laterality Date    Angiogram N/A 01/07/2022    Cataract extraction w/  intraocular lens implant Right 2017    Dr. Elias Bender bare metal stent (bms)      Colonoscopy N/A 06/05/2020    Procedure: COLONOSCOPY;  Surgeon: Ye Ling MD;  Location: [facility]  ENDOSCOPY    Colonoscopy N/A 08/21/2024    Procedure: COLONOSCOPY;  Surgeon: Vita Adams MD;  Location: [facility] ENDOSCOPY    Colonoscopy screening - referral N/A 12/27/2022    Procedure: COLONOSCOPY-SCREENING;  Surgeon: Ye Ling MD;  Location: [facility] ENDOSCOPY    Hysterectomy      Vit for macular hole Right 2016    surgery for macular hole in 2016- Dr. Ross?    Yag capsulotomy - od - right eye Right 2017    Dr. Griffin     Family History   Problem Relation Age of Onset    Cancer Father 62        COLON CANCER    Hypertension Mother     Other (Other) Mother         CIRCULATION PROBLEM    Other (CHF) Other     Macular degeneration Neg     Glaucoma Neg      Social History     Tobacco Use    Smoking status: Never    Smokeless tobacco: Never   Substance Use Topics    Alcohol use: Not Currently       SYSTEM Check if Review is Normal Check if Physical Exam is Normal If not normal, please explain:   HEENT [ ] [X ]    NECK & BACK [ ] [ ]    HEART [ X ] [ X ]    LUNGS [ X ] [ X ]    ABDOMEN [ X ] [ X ]    UROGENITAL [ ] [ ]    EXTREMITIES [ ] [ ]    OTHER        See Anesthesia documentation    [ x ] I have discussed the risks and benefits and alternatives with the patient/family.  They understand and agree to proceed with plan of care.  [ x ] I have reviewed the History and Physical done within the last 30 days.  Any changes noted above.    Ye Ling MD  3/7/2025  10:30 AM             [1]   Medications Prior to Admission   Medication Sig Dispense Refill Last Dose/Taking    Simethicone 250 MG Oral Cap Take by mouth. NIGHT  BEFORE COLONOSCOPY   Taking    losartan 100 MG Oral Tab Take 1 tablet (100 mg total) by mouth daily.   Taking    furosemide 40 MG Oral Tab Take 0.5 tablets (20 mg total) by mouth 3 (three) times a week. Monday, Wednesday and Fridays and as directed 30 tablet 3 Taking    aspirin 81 MG Oral Tab EC Take 1 tablet (81 mg total) by mouth daily. Do not take until cleared by  clinic when  CBC is reviewed on Monday 8/26 30 tablet 0 Taking    metFORMIN 500 MG Oral Tab Take 1 tablet (500 mg total) by mouth daily with breakfast.   Taking    famotidine 10 MG Oral Tab Take 1 tablet (10 mg total) by mouth daily as needed for Heartburn.   Taking As Needed    empagliflozin 10 MG Oral Tab Take 0.5 tablets (5 mg total) by mouth daily.   Taking    amLODIPine 10 MG Oral Tab Take 1 tablet (10 mg total) by mouth at bedtime.   Taking    ROSUVASTATIN 40 MG Oral Tab TAKE ONE TABLET BY MOUTH DAILY 90 tablet 0 Taking    CARVEDILOL 25 MG Oral Tab TAKE ONE TABLET BY MOUTH TWICE A DAY WITH MEALS 180 tablet 1 Taking    SITagliptin Phosphate (JANUVIA) 50 MG Oral Tab Take 1 tablet (50 mg total) by mouth daily. 90 tablet 1 Taking    Na Sulfate-K Sulfate-Mg Sulf (SUPREP BOWEL PREP KIT) 17.5-3.13-1.6 GM/177ML Oral Solution Take prep as directed by gastro office. May substitute with Trilyte/generic equivalent if needed. 1 each 0     Elastic Bandages & Supports (MEDICAL COMPRESSION STOCKINGS) Does not apply Misc 1 each daily. Knee high 15-20 mmhg wear during the day off at night 1 each 0     ACCU-CHEK SOFTCLIX LANCETS Does not apply Misc TEST BLOOD GLUCOSE ONCE DAILY 100 each 3     Glucose Blood (ACCU-CHEK MILLA PLUS) In Vitro Strip 2 (two) times a day.      [2]   Allergies  Allergen Reactions    Flu Virus Vaccine OTHER (SEE COMMENTS)     Other reaction(s): Propensity to adverse reactions to drug    Sulfa Antibiotics OTHER (SEE COMMENTS)    Other OTHER (SEE COMMENTS)     wool

## 2025-03-11 ENCOUNTER — OFFICE VISIT (OUTPATIENT)
Dept: ENDOCRINOLOGY CLINIC | Facility: CLINIC | Age: 75
End: 2025-03-11

## 2025-03-11 VITALS
BODY MASS INDEX: 35.99 KG/M2 | SYSTOLIC BLOOD PRESSURE: 154 MMHG | DIASTOLIC BLOOD PRESSURE: 78 MMHG | HEIGHT: 69 IN | WEIGHT: 243 LBS | HEART RATE: 52 BPM

## 2025-03-11 DIAGNOSIS — E11.22 CONTROLLED TYPE 2 DIABETES MELLITUS WITH CHRONIC KIDNEY DISEASE, WITHOUT LONG-TERM CURRENT USE OF INSULIN, UNSPECIFIED CKD STAGE (HCC): Primary | ICD-10-CM

## 2025-03-11 DIAGNOSIS — I50.32 CHRONIC HEART FAILURE WITH PRESERVED EJECTION FRACTION (HFPEF) (HCC): Primary | ICD-10-CM

## 2025-03-11 LAB
GLUCOSE BLOOD: 127
HEMOGLOBIN A1C: 6.6 % (ref 4.3–5.6)
TEST STRIP LOT #: NORMAL NUMERIC

## 2025-03-11 PROCEDURE — 3044F HG A1C LEVEL LT 7.0%: CPT

## 2025-03-11 PROCEDURE — 3008F BODY MASS INDEX DOCD: CPT

## 2025-03-11 PROCEDURE — 1159F MED LIST DOCD IN RCRD: CPT

## 2025-03-11 PROCEDURE — 3078F DIAST BP <80 MM HG: CPT

## 2025-03-11 PROCEDURE — 99214 OFFICE O/P EST MOD 30 MIN: CPT

## 2025-03-11 PROCEDURE — 83036 HEMOGLOBIN GLYCOSYLATED A1C: CPT

## 2025-03-11 PROCEDURE — 3077F SYST BP >= 140 MM HG: CPT

## 2025-03-11 PROCEDURE — 82947 ASSAY GLUCOSE BLOOD QUANT: CPT

## 2025-03-11 PROCEDURE — 1160F RVW MEDS BY RX/DR IN RCRD: CPT

## 2025-03-11 NOTE — PROGRESS NOTES
[facility]    Rosita Lemus Patient Status:  No patient class for patient encounter    1950 MRN [id number]   Location [facility] MD Dr. Shana Guzmán is a 74 year old female who presents to clinic for assessment and management for  chronic heart failure.    Admitted -24 with rectal bleeding. Hgb down to 8.3, at 8.8 upon dc. S/p colonoscopy without active bleeding. Asa held, to resume if HGB stable. Lasix held with hypovolemia. Lisinopril dcd.    Admitted for elective cardiomems procedure 23.  RA 5, RV 39/7, PA 36/6 mean 20, PCW 10, CO 5 / CI 2.2, SVR 1486 /   Hypovolemic. Reduce diuretics to avoid LINDA. Started on plavix 75 mg daily.        Problem List:  Chronic HFpEF, NYHA class III, Stage C, EF 55-60%  Cardiomems 23, Goal range 12-18 mmHg   CKD   CAD with PCI, FAUZIA of LAD x2 2022  DM type 2, on insulin  HTN  HLD    Subjective:  Here on her own  Feeling good   Just back from cruise, was very active.   Home weight is stable,  ranging 240 #.  Denies logan, cp, dizziness, or orthopnea with 2 pillows.   No logan walking 400 feet or up 5-8 stairs at home   Occasional bloating, chronic LE edema at baseline wearing compression stockings,   Wearing cpap      Review of Systems:  Constitutional: negative for chills or fever  Respiratory:  negative for cough, hemoptysis and wheezing  Cardiovascular: negative for chest pain, exertional chest pressure/discomfort, near-syncope, orthopnea and palpitations  Gastrointestinal: negative for abdominal pain, diarrhea, melena, nausea and vomiting  Hematologic/lymphatic: negative  Musculoskeletal: negative for muscle weakness, chronic R knee pain     Objective:  Lab Results   Component Value Date/Time    WBC 5.7 2025 07:49 AM    HGB 13.2 2025 07:49 AM    HCT 40.7 2025 07:49 AM    .0 2025 07:49 AM    CREATSERUM 1.00 2025 07:49 AM    BUN 12  03/13/2025 07:49 AM     03/13/2025 07:49 AM    K 4.2 03/13/2025 07:49 AM     03/13/2025 07:49 AM    CO2 30.0 03/13/2025 07:49 AM     (H) 03/13/2025 07:49 AM    CA 9.0 03/13/2025 07:49 AM    ALB 3.6 04/20/2022 08:49 AM    ALB 4.00 04/20/2022 08:49 AM    ALKPHO 88 04/20/2022 08:49 AM    BILT 0.3 04/20/2022 08:49 AM    TP 7.2 04/20/2022 08:49 AM    TP 7.3 04/20/2022 08:49 AM    AST 13 (L) 04/20/2022 08:49 AM    ALT 18 04/20/2022 08:49 AM    PTT 21.9 (L) 08/21/2024 08:58 AM    INR 1.12 08/21/2024 08:58 AM    PTP 15.1 (H) 08/21/2024 08:58 AM    T4F 1.0 12/05/2021 07:42 AM    TSH 2.170 12/05/2021 07:42 AM    ESRML 20 04/20/2022 08:49 AM    CRP <0.29 04/20/2022 08:49 AM    PHOS 3.3 04/20/2022 08:49 AM    B12 692 10/14/2021 06:32 AM    PGLU 114 (H) 03/07/2025 10:38 AM       Labs drawn : BMP, pro BNP, cbc, iron studies, reviewed with patient, independently interpreted results    /66 (BP Location: Right arm)   Pulse 51   Wt 242 lb 11.2 oz (110.1 kg)   SpO2 98%   BMI 35.84 kg/m²       Date  Clinic wt Home wt MCI wt DC wt IV med  cardiomems   11/8/23   235      12/28/23 238        1/3/24   236      2/6/24 240 238    12 mmHg   6/4/24 239     13 mmHg    8/22/24    240  9 mmHg   8/26/24 246 246    14 mmhg   11/18/24 236 238 ?   Venofer 200 mg IV 9-10 mmHg   1/13/25 244 * 240       3/11/25 242 242    7-9 mmHg*   *heavy clothes and shoes    General appearance: alert, appears stated age and cooperative  Neck: no JVD at 90 degrees  Lungs: clear to auscultation bilaterally  Heart: S1, S2 normal, + murmur, no click, rub or gallop, regular rate and rhythm- HR 50 bpm   Abdomen: soft, non-tender; bowel sounds normal; no masses,  mod abdominal distension  Extremities: extremities normal, atraumatic, no cyanosis, +1RLE upper mid tibial to pedals, + -2 LLE edema, from below knee to pedal, no open wounds or blisters   Pulses: 2+ and symmetric  Neurologic: Grossly normal  Measurements:  3/13/25)  RLE Calf: 17.75\"  Ankle: 11\"    LLE Calf: 17\" Ankle: 12\"   25) RLE Calf: 16.75\" Ankle: 11.75\"  LLE Calf: 17.25\" Ankle: 11.5\"       Diagnostics:  Rhythm: 24 SR 60 bpm  EK24 sinus taryn 47 bpm.    Echocardiogram: 2023, EF 55-60%, no wma, + diastolic dysfunction, mod stephanie atrial dilation, mild MR, mild/mod TR, RA 8 mmHg. PA 35 mmhg  Heart cath: 2022 FAUZIA X2 to LAD , PCI   RHC: 23  RA 5  RV 39/7  PA 36/6 mean 20  PCW 10     CO 5 / CI 2.2  SVR 1486 /     Recommendations:  Reduce diuretics to avoid LINDA     Devices:   [  ]ICD  [ ]BIV-ICD  [  ]PPM  [  ]Life Vest  [x  ]CardioMEMS    Assessment:  Chronic HFp EF, NYHA class III, stage C  -EF 55-60%  -diuretics: furosemide 20 mg 3 times weekly  -GDMT: coreg, losartan, jardiance  -PAD 7-9 mmHg today in clinic, ranging 9-15 mmHg in last week. (PAD goal 11-17 )  -weight down 2 lbs, home wt stable at 240-242 , mild chronic stephanie LE edema,  no logan, cp or dizziness  - BP normal today   -Renal function normal, bun/cr:12/1.0, K 4.2  -pro  today (3/13/25) <-294 ()   -near euvolemic today with mild chronic stephanie LE edema, BP and renal function normal today.   -continue same medications  -continue furosemide 20 mg tab 3 x weekly  -follow up with [facility] in 3 months  -follow up with Dr. Saldana 2025    Hx GI bleed and hx of iron deficiency anemia   -s/p venofer infusions weekly x3, completed 24  -repeat iron counts 24, iron sat 13%, ferritin 20  - repeat venofer 200 mg IV weekly x3, completed 24  -HGB 13.2 today  -no recurrent report rectal bleeding or melena  - repeat iron sat 24 %, ferritin 67 today  -no replacement recommended at this time, discussed ferrous sulfate at home, pt declined with hx of constipation in the past.   -she has repeat blood work ordered in one month including iron studies. She will discuss with Dr. Tomasz STROUD with PCI of LAD 2022    -back on asa, con't on statin    CKD stage IIIa-->II  -renal function  normal/stable  -eGFR   - follows with Dr. Whitehead     HTN  -BP near high normal   -continue amlodipine, coreg, jardiance, losartan, furosemide    LILY  - wearing cpap   - following with Dr. Aguilar and Rodríguez piedra    Plan:     Patient Instructions   Continue all your medications as prescribed     Follow up with Dr. Saldana in April  and Dr. Tolbert on 4/22/25    Follow up with the specialty care clinic in 3 months ( mid June)       Call if you are having shortness of breath, cough, wheezing, chest pain, dizziness, lightheadedness, heart racing, palpitations, swelling, rapid weight gain, fatigue, weakness, fever or chills.  Call 911 with severe shortness of breath, chest pain or chest pressure not improved after 15 minutes of rest or if feeling faint,  passing out or having a fall     Weigh yourself daily in the morning before breakfast, call if gaining 3 lbs or more overnight or more than 5 lbs in one week.    Keep daily fluids at 48-64 ounces per day (1.5-2 liters of liquid or 6-8 , 8 oz glasses of liquid)    Heart healthy diet. Limit sodium to  2000 mg daily. Common high sodium foods include frozen dinners, soups (not homemade), some cereal, vegetable juice, canned vegetables, lunch meats, processed meats like hotdogs, sausage, bruce, pepperoni, soy sauce, pre-packaged rice or potatoes. Please remember to read nutrition labels for sodium content.   www.healthyeating.nhlbi.nih.gov    Exercise daily as tolerated, with goal of doing moderate aerobic exercise like walking for about 30 minutes 5 days per week. Start by walking at a slow to moderate pace for 3-5 minutes 2-3 times a day. Pace your activity to prevent shortness of breath or fatigue. Stop exercise if you develop chest pain, lightheadedness, or significant shortness of breath    Always carry a copy of your current medication list with you        Current Outpatient Medications:     furosemide 20 MG Oral Tab, Take 1 tablet (20 mg total) by mouth 3 (three) times a  week. Monday, Wednesday and Fridays and as directed, Disp: 100 tablet, Rfl: 3    losartan 100 MG Oral Tab, Take 1 tablet (100 mg total) by mouth daily., Disp: , Rfl:     aspirin 81 MG Oral Tab EC, Take 1 tablet (81 mg total) by mouth daily. Do not take until cleared by HF clinic when CBC is reviewed on Monday 8/26, Disp: 30 tablet, Rfl: 0    metFORMIN 500 MG Oral Tab, Take 1 tablet (500 mg total) by mouth daily with breakfast., Disp: , Rfl:     famotidine 10 MG Oral Tab, Take 1 tablet (10 mg total) by mouth daily as needed for Heartburn., Disp: , Rfl:     empagliflozin 10 MG Oral Tab, Take 0.5 tablets (5 mg total) by mouth daily., Disp: , Rfl:     amLODIPine 10 MG Oral Tab, Take 1 tablet (10 mg total) by mouth at bedtime., Disp: , Rfl:     ROSUVASTATIN 40 MG Oral Tab, TAKE ONE TABLET BY MOUTH DAILY, Disp: 90 tablet, Rfl: 0    CARVEDILOL 25 MG Oral Tab, TAKE ONE TABLET BY MOUTH TWICE A DAY WITH MEALS, Disp: 180 tablet, Rfl: 1    SITagliptin Phosphate (JANUVIA) 50 MG Oral Tab, Take 1 tablet (50 mg total) by mouth daily., Disp: 90 tablet, Rfl: 1    Elastic Bandages & Supports (MEDICAL COMPRESSION STOCKINGS) Does not apply Misc, 1 each daily. Knee high 15-20 mmhg wear during the day off at night, Disp: 1 each, Rfl: 0    ACCU-CHEK SOFTCLIX LANCETS Does not apply Misc, TEST BLOOD GLUCOSE ONCE DAILY, Disp: 100 each, Rfl: 3    Glucose Blood (ACCU-CHEK MILLA PLUS) In Vitro Strip, 2 (two) times a day., Disp: , Rfl:     AMANDA ALCARAZ NP  3/13/2025       45 minutes spent on patient education, chart review and documentation:  Patient instructed regarding clinic procedures, hours, purpose of clinic visits, sodium restricted diet, low sodium foods, fluid restrictions, daily weights, medication regimen s/s of heart failure exacerbation, iron deficiency, and when to call APN/clinic. Provided patient with  counseling, coordination of care and education given. Patient receptive.

## 2025-03-11 NOTE — PROGRESS NOTES
Name: Rosita Lemus  Date: 3/11/25    Referring Physician: No ref. provider found    HISTORY OF PRESENT ILLNESS   Rosita Lemus is a 74 year old female who presents for follow up for diabetes mellitus     Diabetes History:  Diagnosed- around 10 years ago   Patient has not had hospitalizations for blood sugar issues    Prior glycohemoglobin were: 9.5% 4/2023; 7.2% 7/2023; 7.0% 12/2023; 7.1% 4/2024; 6.9% 5/2024; 6.2% 10/2024; 6.6% POC today     Dietary compliance: Fair- has been trying to decrease intake of soda in the past several months      Recall:  Breakfast- Chick erika sandwich and a few fries and small fruit cup   Lunch- sometimes skips, typically leftovers from dinner meal   Dinner- Protein, with potato, sometimes vegetable   Snack-chips   Beverages- water, occ orange crush or tea - unsweetened, or occ. Small can ginger ale or sprite     Exercise: Yes- more walking and stairs more      Polyuria/polydipsia: No  Blurred vision: No - blurry vision in R eye- macular hole     Episodes of hypoglycemia: Denies symptoms, has not been checking sugars recently     Blood Glucose:    - Not checking     Current DM Regimen:  Jardiance- 10 mg PO daily   Metformin- 500 mg PO daily   Januvia 50 mg daily    Modifying factors:  Medication adherence: yes   Recent steroids, illness or infections: no       REVIEW OF SYSTEMS  Eyes: Diabetic retinopathy present: Yes- mild NPDR            Most recent visit to eye doctor in last 12 months: Yes- Dr. Richmond last visit 7/2024     CV: Cardiovascular disease present: Yes- s/p stent x 2 12/2021, CHF- follows with HF clinic.          Hypertension present: Yes         Hyperlipidemia present: Yes         Peripheral Vascular Disease present: No    : Nephropathy present: Yes- CKD-follows with Dr. Whitehead    Neuro: Neuropathy present: No, denies symptoms     Skin: Infection or ulceration: No    Osteoporosis: No    Thyroid disease: No      Medications:     Current Outpatient Medications:      metFORMIN 500 MG Oral Tab, Take 1 tablet (500 mg total) by mouth daily with breakfast., Disp: , Rfl:     empagliflozin 10 MG Oral Tab, Take 0.5 tablets (5 mg total) by mouth daily., Disp: , Rfl:     SITagliptin Phosphate (JANUVIA) 50 MG Oral Tab, Take 1 tablet (50 mg total) by mouth daily., Disp: 90 tablet, Rfl: 1    Simethicone 250 MG Oral Cap, Take by mouth. NIGHT  BEFORE COLONOSCOPY, Disp: , Rfl:     losartan 100 MG Oral Tab, Take 1 tablet (100 mg total) by mouth daily., Disp: , Rfl:     Na Sulfate-K Sulfate-Mg Sulf (SUPREP BOWEL PREP KIT) 17.5-3.13-1.6 GM/177ML Oral Solution, Take prep as directed by gastro office. May substitute with Trilyte/generic equivalent if needed., Disp: 1 each, Rfl: 0    furosemide 40 MG Oral Tab, Take 0.5 tablets (20 mg total) by mouth 3 (three) times a week. Monday, Wednesday and Fridays and as directed, Disp: 30 tablet, Rfl: 3    aspirin 81 MG Oral Tab EC, Take 1 tablet (81 mg total) by mouth daily. Do not take until cleared by HF clinic when CBC is reviewed on Monday 8/26, Disp: 30 tablet, Rfl: 0    famotidine 10 MG Oral Tab, Take 1 tablet (10 mg total) by mouth daily as needed for Heartburn., Disp: , Rfl:     Elastic Bandages & Supports (MEDICAL COMPRESSION STOCKINGS) Does not apply Misc, 1 each daily. Knee high 15-20 mmhg wear during the day off at night, Disp: 1 each, Rfl: 0    amLODIPine 10 MG Oral Tab, Take 1 tablet (10 mg total) by mouth at bedtime., Disp: , Rfl:     ROSUVASTATIN 40 MG Oral Tab, TAKE ONE TABLET BY MOUTH DAILY, Disp: 90 tablet, Rfl: 0    ACCU-CHEK SOFTCLIX LANCETS Does not apply Misc, TEST BLOOD GLUCOSE ONCE DAILY, Disp: 100 each, Rfl: 3    CARVEDILOL 25 MG Oral Tab, TAKE ONE TABLET BY MOUTH TWICE A DAY WITH MEALS, Disp: 180 tablet, Rfl: 1    Glucose Blood (ACCU-CHEK MILLA PLUS) In Vitro Strip, 2 (two) times a day., Disp: , Rfl:      Allergies:   Allergies   Allergen Reactions    Flu Virus Vaccine OTHER (SEE COMMENTS)     Other reaction(s): Propensity to  adverse reactions to drug    Sulfa Antibiotics OTHER (SEE COMMENTS)    Other OTHER (SEE COMMENTS)     wool       Social History:   Social History     Socioeconomic History    Marital status: Single   Tobacco Use    Smoking status: Never    Smokeless tobacco: Never   Vaping Use    Vaping status: Never Used   Substance and Sexual Activity    Alcohol use: Not Currently    Drug use: Never       Medical History:   Past Medical History:    Blood disorder    Congestive heart disease (HCC)    Coronary atherosclerosis    Diabetes (HCC)    Diabetes mellitus (HCC)    Diverticulitis    Essential hypertension    High blood pressure    High cholesterol    Hyperlipidemia    Renal disorder    Sleep apnea    CPAP       Surgical history:   Past Surgical History:   Procedure Laterality Date    Angiogram N/A 01/07/2022    Cataract extraction w/  intraocular lens implant Right 2017    Dr. Griffin    Cath bare metal stent (bms)      Colonoscopy N/A 06/05/2020    Procedure: COLONOSCOPY;  Surgeon: Ye Ling MD;  Location: [facility] ENDOSCOPY    Colonoscopy N/A 08/21/2024    Procedure: COLONOSCOPY;  Surgeon: Vita Adams MD;  Location: [facility] ENDOSCOPY    Colonoscopy N/A 3/7/2025    Procedure: COLONOSCOPY;  Surgeon: Ye Ling MD;  Location: [facility] ENDOSCOPY    Colonoscopy screening - referral N/A 12/27/2022    Procedure: COLONOSCOPY-SCREENING;  Surgeon: Ye Ling MD;  Location: [facility] ENDOSCOPY    Hysterectomy      Vit for macular hole Right 2016    surgery for macular hole in 2016- Dr. Ross?    Yag capsulotomy - od - right eye Right 2017    Dr. Griffin       PHYSICAL EXAM  Vitals:    03/11/25 0951   BP: 154/78   Pulse: 52   Weight: 243 lb (110.2 kg)   Height: 5' 9\" (1.753 m)         General Appearance:  alert, well developed, in no acute distress  Eyes:  normal conjunctivae, sclera, and normal pupils  Neck: Trachea midline: Normal  Back: no kyphosis or back tenderness  Lymph Nodes:  No abnormal nodes  noted  Musculoskeletal:  normal muscle strength and tone  Skin:  normal moisture and skin texture  Hair & Nails:  normal scalp hair     Hematologic:  no excessive bruising  Neuro:  sensory grossly intact and motor grossly intact.  Psychiatric:  oriented to time, self, and place  Nutritional:  no abnormal weight gain or loss      ASSESSMENT/PLAN:    Diabetes mellitus type 2 controlled  -HgA1c- 6.6% POC today   -Reviewed ABC's of diabetes   - Reviewed pathogenesis of diabetes.   - Reviewed importance of good glycemic control to prevent microvascular and macrovascular complications including nephropathy, neuropathy, retinopathy, and cardiovascular disease.  - Reviewed importance of SBGM- check glucose 2 times daily   - Reviewed target glucose goals for patient  fasting and <180 post prandially   - Reviewed importance of following diabetic diet- recommended 135 grams of CHO per day or 45 grams per meal.   - Provided patient education materials    - Continue Jardiance 10mg PO daily. Reviewed side effects and risks vs benefits of medication. Reviewed importance of staying well hydrated on medication     -Continue Metformin 500mg PO daily - eGFR improved at 63 1/2025.    -Continue Januvia  50mg PO daily. Decreased at last visit given decline in kidney function. eGFR- 39 4/2/24 but now improved to 61, 9/2024    - BP elevated - normal on repeat  - lipids at goal 8/2024- on rosuvastatin 40mg daily   -+ nephropathy- CKD- following with nephrology  - UTD with optho- reviewed importance of optho follow up- last visit 7/11/24  - foot exam   - Reviewed low CHO diet today  - Check sugars 1-2 times daily- alternating fasting and post prandial- Call  office if sugars <80 or persistently >180.     RTC in 3 months   3/11/25  MATHIEU Agustin    A total of 30 minutes was spent on obtaining history, reviewing pertinent imaging/labs and specialists notes, evaluating patient, providing multiple treatment options, reinforcing  diet/exercise and compliance, and completing documentation.

## 2025-03-13 ENCOUNTER — LAB ENCOUNTER (OUTPATIENT)
Dept: LAB | Facility: HOSPITAL | Age: 75
End: 2025-03-13
Attending: NURSE PRACTITIONER
Payer: MEDICARE

## 2025-03-13 ENCOUNTER — OFFICE VISIT (OUTPATIENT)
Dept: CARDIOLOGY CLINIC | Facility: HOSPITAL | Age: 75
End: 2025-03-13
Attending: NURSE PRACTITIONER
Payer: MEDICARE

## 2025-03-13 VITALS
OXYGEN SATURATION: 98 % | BODY MASS INDEX: 36 KG/M2 | SYSTOLIC BLOOD PRESSURE: 130 MMHG | WEIGHT: 242.69 LBS | DIASTOLIC BLOOD PRESSURE: 66 MMHG | HEART RATE: 51 BPM

## 2025-03-13 DIAGNOSIS — N18.30 CKD STAGE 3 DUE TO TYPE 2 DIABETES MELLITUS (HCC): ICD-10-CM

## 2025-03-13 DIAGNOSIS — D50.0 IRON DEFICIENCY ANEMIA DUE TO CHRONIC BLOOD LOSS: ICD-10-CM

## 2025-03-13 DIAGNOSIS — I10 PRIMARY HYPERTENSION: ICD-10-CM

## 2025-03-13 DIAGNOSIS — I50.32 CHRONIC HEART FAILURE WITH PRESERVED EJECTION FRACTION (HFPEF) (HCC): Primary | ICD-10-CM

## 2025-03-13 DIAGNOSIS — I25.10 CORONARY ARTERY DISEASE INVOLVING NATIVE CORONARY ARTERY OF NATIVE HEART WITHOUT ANGINA PECTORIS: ICD-10-CM

## 2025-03-13 DIAGNOSIS — G47.33 OSA (OBSTRUCTIVE SLEEP APNEA): ICD-10-CM

## 2025-03-13 DIAGNOSIS — I50.32 CHRONIC HEART FAILURE WITH PRESERVED EJECTION FRACTION (HFPEF) (HCC): ICD-10-CM

## 2025-03-13 DIAGNOSIS — E11.22 CKD STAGE 3 DUE TO TYPE 2 DIABETES MELLITUS (HCC): ICD-10-CM

## 2025-03-13 LAB
ANION GAP SERPL CALC-SCNC: 6 MMOL/L (ref 0–18)
BASOPHILS # BLD AUTO: 0.02 X10(3) UL (ref 0–0.2)
BASOPHILS NFR BLD AUTO: 0.4 %
BUN BLD-MCNC: 12 MG/DL (ref 9–23)
BUN/CREAT SERPL: 12 (ref 10–20)
CALCIUM BLD-MCNC: 9 MG/DL (ref 8.7–10.4)
CHLORIDE SERPL-SCNC: 106 MMOL/L (ref 98–112)
CO2 SERPL-SCNC: 30 MMOL/L (ref 21–32)
CREAT BLD-MCNC: 1 MG/DL
DEPRECATED HBV CORE AB SER IA-ACNC: 67 NG/ML
DEPRECATED RDW RBC AUTO: 49.2 FL (ref 35.1–46.3)
EGFRCR SERPLBLD CKD-EPI 2021: 60 ML/MIN/1.73M2 (ref 60–?)
EOSINOPHIL # BLD AUTO: 0.19 X10(3) UL (ref 0–0.7)
EOSINOPHIL NFR BLD AUTO: 3.3 %
ERYTHROCYTE [DISTWIDTH] IN BLOOD BY AUTOMATED COUNT: 14.8 % (ref 11–15)
FASTING STATUS PATIENT QL REPORTED: YES
GLUCOSE BLD-MCNC: 112 MG/DL (ref 70–99)
HCT VFR BLD AUTO: 40.7 %
HGB BLD-MCNC: 13.2 G/DL
IMM GRANULOCYTES # BLD AUTO: 0.02 X10(3) UL (ref 0–1)
IMM GRANULOCYTES NFR BLD: 0.4 %
IRON SATN MFR SERPL: 24 %
IRON SERPL-MCNC: 66 UG/DL
LYMPHOCYTES # BLD AUTO: 2.27 X10(3) UL (ref 1–4)
LYMPHOCYTES NFR BLD AUTO: 39.8 %
MCH RBC QN AUTO: 29.3 PG (ref 26–34)
MCHC RBC AUTO-ENTMCNC: 32.4 G/DL (ref 31–37)
MCV RBC AUTO: 90.4 FL
MONOCYTES # BLD AUTO: 0.39 X10(3) UL (ref 0.1–1)
MONOCYTES NFR BLD AUTO: 6.8 %
NEUTROPHILS # BLD AUTO: 2.82 X10 (3) UL (ref 1.5–7.7)
NEUTROPHILS # BLD AUTO: 2.82 X10(3) UL (ref 1.5–7.7)
NEUTROPHILS NFR BLD AUTO: 49.3 %
NT-PROBNP SERPL-MCNC: 392 PG/ML (ref ?–125)
OSMOLALITY SERPL CALC.SUM OF ELEC: 295 MOSM/KG (ref 275–295)
PLATELET # BLD AUTO: 199 10(3)UL (ref 150–450)
POTASSIUM SERPL-SCNC: 4.2 MMOL/L (ref 3.5–5.1)
RBC # BLD AUTO: 4.5 X10(6)UL
SODIUM SERPL-SCNC: 142 MMOL/L (ref 136–145)
TOTAL IRON BINDING CAPACITY: 279 UG/DL (ref 250–425)
TRANSFERRIN SERPL-MCNC: 212 MG/DL (ref 250–380)
WBC # BLD AUTO: 5.7 X10(3) UL (ref 4–11)

## 2025-03-13 PROCEDURE — 83540 ASSAY OF IRON: CPT | Performed by: NURSE PRACTITIONER

## 2025-03-13 PROCEDURE — 36415 COLL VENOUS BLD VENIPUNCTURE: CPT

## 2025-03-13 PROCEDURE — 80048 BASIC METABOLIC PNL TOTAL CA: CPT

## 2025-03-13 PROCEDURE — 84466 ASSAY OF TRANSFERRIN: CPT | Performed by: NURSE PRACTITIONER

## 2025-03-13 PROCEDURE — 83880 ASSAY OF NATRIURETIC PEPTIDE: CPT

## 2025-03-13 PROCEDURE — 99215 OFFICE O/P EST HI 40 MIN: CPT | Performed by: NURSE PRACTITIONER

## 2025-03-13 PROCEDURE — 82728 ASSAY OF FERRITIN: CPT

## 2025-03-13 PROCEDURE — 85025 COMPLETE CBC W/AUTO DIFF WBC: CPT

## 2025-03-13 RX ORDER — FUROSEMIDE 20 MG/1
20 TABLET ORAL
Qty: 100 TABLET | Refills: 3 | Status: SHIPPED | OUTPATIENT
Start: 2025-03-14

## 2025-03-13 NOTE — PATIENT INSTRUCTIONS
Continue all your medications as prescribed     Follow up with Dr. Saldana in April  and Dr. Tolbert on 4/22/25    Follow up with the specialty care clinic in 3 months ( mid June)       Call if you are having shortness of breath, cough, wheezing, chest pain, dizziness, lightheadedness, heart racing, palpitations, swelling, rapid weight gain, fatigue, weakness, fever or chills.  Call 911 with severe shortness of breath, chest pain or chest pressure not improved after 15 minutes of rest or if feeling faint,  passing out or having a fall     Weigh yourself daily in the morning before breakfast, call if gaining 3 lbs or more overnight or more than 5 lbs in one week.    Keep daily fluids at 48-64 ounces per day (1.5-2 liters of liquid or 6-8 , 8 oz glasses of liquid)    Heart healthy diet. Limit sodium to  2000 mg daily. Common high sodium foods include frozen dinners, soups (not homemade), some cereal, vegetable juice, canned vegetables, lunch meats, processed meats like hotdogs, sausage, bruce, pepperoni, soy sauce, pre-packaged rice or potatoes. Please remember to read nutrition labels for sodium content.   www.healthyeating.nhlbi.nih.gov    Exercise daily as tolerated, with goal of doing moderate aerobic exercise like walking for about 30 minutes 5 days per week. Start by walking at a slow to moderate pace for 3-5 minutes 2-3 times a day. Pace your activity to prevent shortness of breath or fatigue. Stop exercise if you develop chest pain, lightheadedness, or significant shortness of breath    Always carry a copy of your current medication list with you

## 2025-03-13 NOTE — PROGRESS NOTES
Rosita ambulates into clinic with cane.  Subjective: Reports feeling well over all, denies increased shortness of breath, dizziness or chest pain.  Rosita reports eating smart on her cruise, took her C-PAP with her.    Weight:  Today 242.7 lb  Home none Last visit 244 lb  Labs completed: at lab BMP,proBNP,CBC, iron studies  Device:  CardioMEMS Interrogation YES reviewed reading PAD: 7 GOAL 11-17  IV placed:  NO  Measurements:  RLE Calf: 17.75\" Ankle: 11\"    LLE Calf: 17\" Ankle: 12\"    Patient and medication assessed. Discussed with APN. Treatments completed CardioMEMS interrogation, leg measurements.  Medications given:none. Extensive education of disease management including:CHF.  Reviewed AVS instructions. Patient verbalized understanding.

## 2025-03-24 ENCOUNTER — NURSE ONLY (OUTPATIENT)
Dept: CARDIOLOGY CLINIC | Facility: HOSPITAL | Age: 75
End: 2025-03-24
Attending: NURSE PRACTITIONER
Payer: MEDICARE

## 2025-03-24 DIAGNOSIS — I50.32 CHRONIC HEART FAILURE WITH PRESERVED EJECTION FRACTION (HFPEF) (HCC): Primary | ICD-10-CM

## 2025-03-24 PROCEDURE — 93264 REM MNTR WRLS P-ART PRS SNR: CPT | Performed by: NURSE PRACTITIONER

## 2025-03-24 NOTE — PROGRESS NOTES
CARDIOMEMS REMOTE MONTHLY BILLING TEMPLATE        Remote Hemodynamic Device Report Summary - Remote Visit    Patient Name: Rosita Lemus MRN: [id number] : 1950         Provider:   Amanda MELISSA  Cardiologist: Dr. Naresh Saldana                                    Diagnosis: HFpEF     Reporting period: 2025 - 2025  Cardiomems Goal: PAD goal range 12-18 > 11-17 >8-14 mmhg.    Implant date 23; PA 36/6, m 20, PCWP 10, DPG -4     Current readings:   PAD 11 mmHg on 25  PAD readings ranging 7-15 mmHg in last 30 days.      Remote monitoring documentation:   Reviewed above readings weekly. Euvolemic on lower dose diuretics, adjusting diuretics prn.   Plan:  Continue PA pressure monitoring weekly, continued patient assessment and communication per abbot and Epic messaging, phone or clinic visit to maintain optimal range of PA pressures  AMANDA ALCARAZ NP,   25

## 2025-04-22 NOTE — PROGRESS NOTES
CARDIOMEMS REMOTE MONTHLY BILLING TEMPLATE        Remote Hemodynamic Device Report Summary - Remote Visit    Patient Name: Rosita Lemus MRN: [id number] : 1950         Provider:   Amanda MELISSA  Cardiologist: Dr. Naresh Saldana                                    Diagnosis: HFpEF     Reporting period: 2025 - 2025  Cardiomems Goal: PAD goal range 12-18 > 11-17 >8-14 mmhg.    Implant date 23; PA 36/6, m 20, PCWP 10, DPG -4     Current readings:   PAD 12 mmHg on 25  PAD readings ranging 8-14 mmHg in last 30 days.      Remote monitoring documentation:   Reviewed above readings weekly. Euvolemic on lower dose diuretics, adjusting diuretics prn.   Plan:  Continue PA pressure monitoring weekly, continued patient assessment and communication per abbot and Epic messaging, phone or clinic visit to maintain optimal range of PA pressures  AMANDA ALCARAZ NP,   25

## 2025-04-23 ENCOUNTER — NURSE ONLY (OUTPATIENT)
Dept: CARDIOLOGY CLINIC | Facility: HOSPITAL | Age: 75
End: 2025-04-23
Attending: NURSE PRACTITIONER
Payer: MEDICARE

## 2025-04-23 DIAGNOSIS — I50.32 CHRONIC HEART FAILURE WITH PRESERVED EJECTION FRACTION (HFPEF) (HCC): Primary | ICD-10-CM

## 2025-04-23 PROCEDURE — 93264 REM MNTR WRLS P-ART PRS SNR: CPT | Performed by: NURSE PRACTITIONER

## 2025-05-21 NOTE — PROGRESS NOTES
CARDIOMEMS REMOTE MONTHLY BILLING TEMPLATE        Remote Hemodynamic Device Report Summary - Remote Visit    Patient Name: Rosita Lemus MRN: [id number] : 1950         Provider:   Amanda MELISSA  Cardiologist: Dr. Naresh Saldana                                    Diagnosis: Chronic HFpEF     Reporting period: 2025 - 2025  Cardiomems Goal: PAD goal range 12-18 > 11-17 >8-14 mmhg.    Implant date 23; PA 36/6, m 20, PCWP 10, DPG -4     Current readings:   PAD 10 mmHg on 25  PAD readings ranging 7-12 mmHg in last 30 days.      Remote monitoring documentation:   Reviewed above readings weekly. Euvolemic on lower dose diuretics, adjusting diuretics prn.   Plan:  Continue PA pressure monitoring weekly, continued patient assessment and communication per abbot and Epic messaging, phone or clinic visit to maintain optimal range of PA pressures  AMANDA ALCARAZ NP,   25

## 2025-05-27 ENCOUNTER — NURSE ONLY (OUTPATIENT)
Dept: CARDIOLOGY CLINIC | Facility: HOSPITAL | Age: 75
End: 2025-05-27
Attending: NURSE PRACTITIONER
Payer: MEDICARE

## 2025-05-27 DIAGNOSIS — I50.32 CHRONIC HEART FAILURE WITH PRESERVED EJECTION FRACTION (HFPEF) (HCC): Primary | ICD-10-CM

## 2025-05-27 PROCEDURE — 93264 REM MNTR WRLS P-ART PRS SNR: CPT | Performed by: NURSE PRACTITIONER

## 2025-06-12 DIAGNOSIS — I50.32 CHRONIC HEART FAILURE WITH PRESERVED EJECTION FRACTION (HFPEF) (HCC): Primary | ICD-10-CM

## 2025-06-13 ENCOUNTER — LAB ENCOUNTER (OUTPATIENT)
Dept: LAB | Facility: HOSPITAL | Age: 75
End: 2025-06-13
Attending: NURSE PRACTITIONER
Payer: MEDICARE

## 2025-06-13 DIAGNOSIS — I50.32 CHRONIC HEART FAILURE WITH PRESERVED EJECTION FRACTION (HFPEF) (HCC): ICD-10-CM

## 2025-06-13 LAB
ANION GAP SERPL CALC-SCNC: 6 MMOL/L (ref 0–18)
BUN BLD-MCNC: 15 MG/DL (ref 9–23)
BUN/CREAT SERPL: 14.7 (ref 10–20)
CALCIUM BLD-MCNC: 9.2 MG/DL (ref 8.7–10.4)
CHLORIDE SERPL-SCNC: 108 MMOL/L (ref 98–112)
CO2 SERPL-SCNC: 29 MMOL/L (ref 21–32)
CREAT BLD-MCNC: 1.02 MG/DL (ref 0.55–1.02)
EGFRCR SERPLBLD CKD-EPI 2021: 58 ML/MIN/1.73M2 (ref 60–?)
FASTING STATUS PATIENT QL REPORTED: YES
GLUCOSE BLD-MCNC: 130 MG/DL (ref 70–99)
NT-PROBNP SERPL-MCNC: 253 PG/ML (ref ?–125)
OSMOLALITY SERPL CALC.SUM OF ELEC: 299 MOSM/KG (ref 275–295)
POTASSIUM SERPL-SCNC: 4 MMOL/L (ref 3.5–5.1)
SODIUM SERPL-SCNC: 143 MMOL/L (ref 136–145)

## 2025-06-13 PROCEDURE — 80048 BASIC METABOLIC PNL TOTAL CA: CPT

## 2025-06-13 PROCEDURE — 83880 ASSAY OF NATRIURETIC PEPTIDE: CPT

## 2025-06-13 PROCEDURE — 36415 COLL VENOUS BLD VENIPUNCTURE: CPT

## 2025-06-16 ENCOUNTER — OFFICE VISIT (OUTPATIENT)
Dept: CARDIOLOGY CLINIC | Facility: HOSPITAL | Age: 75
End: 2025-06-16
Attending: NURSE PRACTITIONER
Payer: MEDICARE

## 2025-06-16 VITALS
DIASTOLIC BLOOD PRESSURE: 62 MMHG | HEART RATE: 63 BPM | WEIGHT: 245.13 LBS | BODY MASS INDEX: 36 KG/M2 | OXYGEN SATURATION: 99 % | SYSTOLIC BLOOD PRESSURE: 128 MMHG

## 2025-06-16 DIAGNOSIS — I25.10 CORONARY ARTERY DISEASE INVOLVING NATIVE CORONARY ARTERY OF NATIVE HEART WITHOUT ANGINA PECTORIS: ICD-10-CM

## 2025-06-16 DIAGNOSIS — G47.33 OSA (OBSTRUCTIVE SLEEP APNEA): ICD-10-CM

## 2025-06-16 DIAGNOSIS — D50.0 IRON DEFICIENCY ANEMIA DUE TO CHRONIC BLOOD LOSS: ICD-10-CM

## 2025-06-16 DIAGNOSIS — N18.2 CKD (CHRONIC KIDNEY DISEASE) STAGE 2, GFR 60-89 ML/MIN: ICD-10-CM

## 2025-06-16 DIAGNOSIS — I50.33 ACUTE ON CHRONIC HEART FAILURE WITH PRESERVED EJECTION FRACTION (HFPEF) (HCC): Primary | Chronic | ICD-10-CM

## 2025-06-16 DIAGNOSIS — I10 PRIMARY HYPERTENSION: ICD-10-CM

## 2025-06-16 LAB — DEPRECATED HBV CORE AB SER IA-ACNC: 56 NG/ML (ref 50–306)

## 2025-06-16 PROCEDURE — 99215 OFFICE O/P EST HI 40 MIN: CPT | Performed by: NURSE PRACTITIONER

## 2025-06-16 NOTE — PATIENT INSTRUCTIONS
Continue all your medications as prescribed     Follow up with Dr. Saldana in October as scheduled     Follow up with the specialty care clinic in 3 months        Call if you are having shortness of breath, cough, wheezing, chest pain, dizziness, lightheadedness, heart racing, palpitations, swelling, rapid weight gain, fatigue, weakness, fever or chills.  Call 911 with severe shortness of breath, chest pain or chest pressure not improved after 15 minutes of rest or if feeling faint, passing out or having a fall.     Weigh yourself daily in the morning before breakfast, call if gaining 3 lbs or more overnight or more than 5 lbs in one week.    Keep daily fluids at 48-64 ounces per day.  (8 oz. = 1 cup)    Heart healthy diet. Limit sodium to  2000 mg daily.   Common high sodium foods include frozen dinners, soups (not homemade), some cereal, vegetable juice, canned vegetables, lunch meats, processed meats like hotdogs, sausage, bruce, pepperoni, soy sauce, pre-packaged rice or potatoes. Remember to read nutrition labels for sodium content.   www.healthyeating.nhlbi.nih.gov    Exercise daily as tolerated, with goal of doing moderate aerobic exercise like walking for about 30 minutes 5 days per week. Start by walking at a slow to moderate pace for 3-5 minutes 2-3 times a day. Pace your activity to prevent shortness of breath or fatigue. Stop exercise if you develop chest pain, lightheadedness, or significant shortness of breath    Always carry a copy of your current medication list with you      **Please provide at least 24 hours’ notice if you need to reschedule or cancel your appointment. A $50 fee will be charged for missed appointments or same-day cancellations to permit others to schedule. If you must cancel, please reschedule at your earliest opportunity to ensure you receive the continuity of care you need.     Patients may appeal this fee by calling one of our service area phone numbers below.  [facility]:  [phone]  [facility]: [phone]  [facility]: [phone]

## 2025-06-16 NOTE — PROGRESS NOTES
Rosita ambulated into clinic with cane.  Subjective: Denies lightheadedness, or dizziness, chest pain or palpitations or shortness of breath.  She reports a little more swelling.    Weight:  Today 245.1 Home NONE Last visit 242.7 lb  Labs completed: at lab 6/13 BMP/proBNP  Device:  CardioMEMS Interrogation AT HOME reviewed reading 9 GOAL 11-17  IV placed:  no  Measurements:   RLE Calf: 16.25\" Ankle: 12.5\"  LLE Calf: 18\" Ankle: 11.5\"    Took whole diuretic today.    Traveling 18-26 of June    Patient and medication assessed. Discussed with APN. Treatments completed leg measurements, cardioMEMS review.  Medications given NONE. Extensive education of disease management including reminder to watch sodium intake on upcoming cruise.  Reviewed AVS instructions. Patient verbalized understanding.

## 2025-06-16 NOTE — PROGRESS NOTES
Specialty Care Clinic    Rosita Lemus Patient Status:  No patient class for patient encounter    1950 MRN [id number]   Location [facility] SPECIALTY CARE CLINIC MD Dr. Shana Guzmán is a 74 year old female who presents to clinic for assessment and management for  chronic heart failure.    Admitted -24 with rectal bleeding. Hgb down to 8.3, at 8.8 upon dc. S/p colonoscopy without active bleeding. Asa held, to resume if HGB stable. Lasix held with hypovolemia. Lisinopril dcd.    Admitted for elective cardiomems procedure 23.  RA 5, RV 39/7, PA 36/6 mean 20, PCW 10, CO 5 / CI 2.2, SVR 1486 /   Hypovolemic. Reduce diuretics to avoid LINDA. Started on plavix 75 mg daily.        Problem List:  Chronic HFpEF, NYHA class III, Stage C, EF 55-60%  Cardiomems 23, Goal range 12-18 mmHg   CKD   CAD with PCI, FAUZIA of LAD x2 2022  DM type 2, on insulin  HTN  HLD    Subjective:  Here on her own  Fatigued at times with increased bloating and stephanie LE edema  Taking full tab - 40 mg of lasix occasionally with increased stephanie LE edema, not weekly, took a full 40 mg tab daily  Home weight is stable,  ranging 240-242 #.  Denies logan, cp, dizziness, or orthopnea with 2 pillows.   No logan walking 400 feet, rare logan up 8 stairs at home , no logan bending   Occasional bloating, chronic LE edema , L>R, wearing compression stockings,   Wearing cpap      Review of Systems:  Constitutional: negative for chills or fever  Respiratory:  negative for cough, hemoptysis and wheezing  Cardiovascular: negative for chest pain, exertional chest pressure/discomfort, near-syncope, orthopnea and palpitations  Gastrointestinal: negative for abdominal pain, diarrhea, melena, nausea and vomiting  Hematologic/lymphatic: negative  Musculoskeletal: negative for muscle weakness, chronic R knee pain     Objective:  Lab Results   Component Value Date/Time    WBC 5.7 2025 07:49 AM    HGB  13.2 03/13/2025 07:49 AM    HCT 40.7 03/13/2025 07:49 AM    .0 03/13/2025 07:49 AM    CREATSERUM 1.02 06/13/2025 07:24 AM    BUN 15 06/13/2025 07:24 AM     06/13/2025 07:24 AM    K 4.0 06/13/2025 07:24 AM     06/13/2025 07:24 AM    CO2 29.0 06/13/2025 07:24 AM     (H) 06/13/2025 07:24 AM    CA 9.2 06/13/2025 07:24 AM    ALB 3.6 04/20/2022 08:49 AM    ALB 4.00 04/20/2022 08:49 AM    ALKPHO 88 04/20/2022 08:49 AM    BILT 0.3 04/20/2022 08:49 AM    TP 7.2 04/20/2022 08:49 AM    TP 7.3 04/20/2022 08:49 AM    AST 13 (L) 04/20/2022 08:49 AM    ALT 18 04/20/2022 08:49 AM    PTT 21.9 (L) 08/21/2024 08:58 AM    INR 1.12 08/21/2024 08:58 AM    PTP 15.1 (H) 08/21/2024 08:58 AM    T4F 1.0 12/05/2021 07:42 AM    TSH 2.170 12/05/2021 07:42 AM    ESRML 20 04/20/2022 08:49 AM    CRP <0.29 04/20/2022 08:49 AM    PHOS 3.3 04/20/2022 08:49 AM    B12 692 10/14/2021 06:32 AM    PGLU 114 (H) 03/07/2025 10:38 AM       Labs drawn : BMP, pro BNP, reviewed with patient, independently interpreted results    /62 (BP Location: Right arm)   Pulse 63   Wt 245 lb 1.6 oz (111.2 kg)   SpO2 99%   BMI 36.19 kg/m²       Date  Clinic wt Home wt MCI wt DC wt IV med  cardiomems   11/8/23   235      12/28/23 238        1/3/24   236      2/6/24 240 238    12 mmHg   6/4/24 239     13 mmHg    8/22/24    240  9 mmHg   8/26/24 246 246    14 mmhg   11/18/24 236 238 ?   Venofer 200 mg IV 9-10 mmHg   1/13/25 244 * 240       3/11/25 242 242    7-9 mmHg*   6/16/25 245 240-242    9 mmHg   *heavy clothes and shoes    General appearance: alert, appears stated age and cooperative  Neck: no JVD at 90 degrees  Lungs: clear to auscultation bilaterally  Heart: S1, S2 normal, + murmur, no click, rub or gallop, regular rate and rhythm- HR 50 bpm   Abdomen: soft, non-tender; bowel sounds normal; no masses,  mod abdominal distension  Extremities: extremities normal, atraumatic, no cyanosis, +1RLE upper mid tibial to pedals, + -2LLE edema,  from below knee to pedal, +3 L ankle edema. no open wounds or blisters   Pulses: 2+ and symmetric  Neurologic: Grossly normal  Measurements:  25:RLE Calf: 16.25\" Ankle: 12.5\"  LLE Calf: 18\" Ankle: 11.5\"   3/13/25)  RLE Calf: 17.75\" Ankle: 11\"    LLE Calf: 17\" Ankle: 12\"   25) RLE Calf: 16.75\" Ankle: 11.75\"  LLE Calf: 17.25\" Ankle: 11.5\"       Diagnostics:  Rhythm: 24 SR 60 bpm  EK24 sinus taryn 47 bpm.    Echocardiogram: 2023, EF 55-60%, no wma, + diastolic dysfunction, mod stephanie atrial dilation, mild MR, mild/mod TR, RA 8 mmHg. PA 35 mmhg  Heart cath: 2022 FAUZIA X2 to LAD , PCI   RHC: 23  RA 5  RV 39/7  PA 36/6 mean 20  PCW 10     CO 5 / CI 2.2  SVR 1486 /     Recommendations:  Reduce diuretics to avoid LINDA     Devices:   [  ]ICD  [ ]BIV-ICD  [  ]PPM  [  ]Life Vest  [x  ]CardioMEMS    Assessment:  Acute on chronic HFp EF, NYHA class III, stage C  -EF 55-60%  -diuretics: furosemide 20 mg (half of 40 mg tab) 3 times weekly  -GDMT: coreg, losartan, jardiance  -PAD 9 mmHg today, ranging 9-12 mmHg in last week. (PAD goal 8-14 )  -weight down 2 lbs, home wt stable at 240-242 , mild chronic stephanie LE edema,  no logan, cp or dizziness  - BP normal today   -Renal function normal, bun/cr:12/1.0, K 4.2  -pro   (25) <-392 (3/13/25) <-294 ()   -near euvolemic today with mild chronic stephanie LE edema, BP and renal function normal today.   -continue same medications  -continue furosemide 20 mg tab 3 x weekly with an extra 20 mg dose prn with increased stephanie ankle edema.  -follow up with [facility] in 3 months   -follow up with Dr. Saldana in October     Hx GI bleed and hx of iron deficiency anemia   -s/p venofer infusions weekly x3, completed 24  -repeat iron counts 24, iron sat 13%, ferritin 20  - repeat venofer 200 mg IV weekly x3, completed 24  -last HGB 13.1 (4/15/25)  -no recurrent report rectal bleeding or melena  - repeat iron sat 24 %, ferritin 67 (3/13/25) --> iron sat   18%(4/15/25), ferritin 56 today   - discussed benefits of venofer for iron deficiency anemia, she is intolerant to ferrous sulfate with constipation in the past despite constipation medications.   -begin venofer weekly x3 doses - spoke with patient by phone with ferritin results and recommendations.     CAD with PCI of LAD 1/2022    -back on asa, con't on statin    CKD stage IIIa-->II  -renal function normal/stable  -eGFR 58  - follows with Dr. Whitehead as directed     HTN  -BP normal   -continue amlodipine, coreg, jardiance, losartan, furosemide    LILY  - wearing cpap   - following with Dr. Aguilar/Rodríguez pulmonology    Plan:     Patient Instructions   Continue all your medications as prescribed     Follow up with Dr. Saldana in October as scheduled     Follow up with the specialty care clinic in 3 months        Call if you are having shortness of breath, cough, wheezing, chest pain, dizziness, lightheadedness, heart racing, palpitations, swelling, rapid weight gain, fatigue, weakness, fever or chills.  Call 911 with severe shortness of breath, chest pain or chest pressure not improved after 15 minutes of rest or if feeling faint, passing out or having a fall.     Weigh yourself daily in the morning before breakfast, call if gaining 3 lbs or more overnight or more than 5 lbs in one week.    Keep daily fluids at 48-64 ounces per day.  (8 oz. = 1 cup)    Heart healthy diet. Limit sodium to  2000 mg daily.   Common high sodium foods include frozen dinners, soups (not homemade), some cereal, vegetable juice, canned vegetables, lunch meats, processed meats like hotdogs, sausage, bruce, pepperoni, soy sauce, pre-packaged rice or potatoes. Remember to read nutrition labels for sodium content.   www.healthyeating.nhlbi.nih.gov    Exercise daily as tolerated, with goal of doing moderate aerobic exercise like walking for about 30 minutes 5 days per week. Start by walking at a slow to moderate pace for 3-5 minutes 2-3 times a  day. Pace your activity to prevent shortness of breath or fatigue. Stop exercise if you develop chest pain, lightheadedness, or significant shortness of breath    Always carry a copy of your current medication list with you      **Please provide at least 24 hours’ notice if you need to reschedule or cancel your appointment. A $50 fee will be charged for missed appointments or same-day cancellations to permit others to schedule. If you must cancel, please reschedule at your earliest opportunity to ensure you receive the continuity of care you need.     Patients may appeal this fee by calling one of our service area phone numbers below.  [facility]: [phone]  [facility]: [phone]  [facility]: [phone]        Current Outpatient Medications:     furosemide 20 MG Oral Tab, Take 1 tablet (20 mg total) by mouth 3 (three) times a week. Monday, Wednesday and Fridays and as directed, Disp: 100 tablet, Rfl: 3    losartan 100 MG Oral Tab, Take 1 tablet (100 mg total) by mouth daily., Disp: , Rfl:     aspirin 81 MG Oral Tab EC, Take 1 tablet (81 mg total) by mouth daily. Do not take until cleared by HF clinic when CBC is reviewed on Monday 8/26, Disp: 30 tablet, Rfl: 0    metFORMIN 500 MG Oral Tab, Take 1 tablet (500 mg total) by mouth daily with breakfast., Disp: , Rfl:     famotidine 10 MG Oral Tab, Take 1 tablet (10 mg total) by mouth daily as needed for Heartburn., Disp: , Rfl:     empagliflozin 10 MG Oral Tab, Take 0.5 tablets (5 mg total) by mouth daily., Disp: , Rfl:     amLODIPine 10 MG Oral Tab, Take 1 tablet (10 mg total) by mouth at bedtime., Disp: , Rfl:     ROSUVASTATIN 40 MG Oral Tab, TAKE ONE TABLET BY MOUTH DAILY, Disp: 90 tablet, Rfl: 0    CARVEDILOL 25 MG Oral Tab, TAKE ONE TABLET BY MOUTH TWICE A DAY WITH MEALS, Disp: 180 tablet, Rfl: 1    SITagliptin Phosphate (JANUVIA) 50 MG Oral Tab, Take 1 tablet (50 mg total) by mouth daily., Disp: 90 tablet, Rfl: 1     Elastic Bandages & Supports (MEDICAL COMPRESSION STOCKINGS) Does not apply Misc, 1 each daily. Knee high 15-20 mmhg wear during the day off at night, Disp: 1 each, Rfl: 0    ACCU-CHEK SOFTCLIX LANCETS Does not apply Misc, TEST BLOOD GLUCOSE ONCE DAILY, Disp: 100 each, Rfl: 3    Glucose Blood (ACCU-CHEK MILLA PLUS) In Vitro Strip, 2 (two) times a day., Disp: , Rfl:     AMANDA ALCARAZ NP  6/16/2025       50 minutes spent on patient education, chart review and documentation:  Patient instructed regarding clinic procedures, hours, purpose of clinic visits, sodium restricted diet, low sodium foods, fluid restrictions, daily weights, medication regimen s/s of heart failure exacerbation, iron deficiency, and when to call APN/clinic. Provided patient with  counseling, coordination of care and education given. Patient receptive.

## 2025-06-26 ENCOUNTER — NURSE ONLY (OUTPATIENT)
Dept: CARDIOLOGY CLINIC | Facility: HOSPITAL | Age: 75
End: 2025-06-26
Attending: NURSE PRACTITIONER
Payer: MEDICARE

## 2025-06-26 DIAGNOSIS — I50.32 CHRONIC HEART FAILURE WITH PRESERVED EJECTION FRACTION (HFPEF) (HCC): Primary | ICD-10-CM

## 2025-06-26 PROCEDURE — 93264 REM MNTR WRLS P-ART PRS SNR: CPT | Performed by: NURSE PRACTITIONER

## 2025-06-26 NOTE — PROGRESS NOTES
CARDIOMEMS REMOTE MONTHLY BILLING TEMPLATE        Remote Hemodynamic Device Report Summary - Remote Visit    Patient Name: Rosita Lemus MRN: [id number] : 1950         Provider:   Amanda MELISSA  Cardiologist: Dr. Naresh Saldana                                    Diagnosis: Chronic HFpEF     Reporting period: 2025 - 2025  Cardiomems Goal: PAD goal range 12-18 > 11-17 >8-14 mmhg.    Implant date 23; PA 36/6, m 20, PCWP 10, DPG -4     Current readings:   PAD 10 mmHg on 25  PAD readings ranging 9-12 mmHg in last 30 days.      Remote monitoring documentation:   Reviewed above readings weekly. Euvolemic on lower dose diuretics, adjusting diuretics prn.   Plan:  Continue PA pressure monitoring weekly, continued patient assessment and communication per abbot and Epic messaging, phone or clinic visit to maintain optimal range of PA pressures  AMANDA ALCARAZ NP,   25

## 2025-07-17 NOTE — PROGRESS NOTES
CARDIOMEMS REMOTE MONTHLY BILLING TEMPLATE        Remote Hemodynamic Device Report Summary - Remote Visit    Patient Name: Rosita Lemus MRN: [id number] : 1950         Provider:   Amanda MELISSA  Cardiologist: Dr. Naresh Saldana                                    Diagnosis: Chronic HFpEF     Reporting period: 2025 - 2025  Cardiomems Goal: PAD goal range 12-18 > 11-17 >8-14 mmhg.    Implant date 23; PA 36/6, m 20, PCWP 10, DPG -4     Current readings:   PAD 9 mmHg on 25  PAD readings ranging 8-15 mmHg in last 30 days.      Remote monitoring documentation:   Reviewed above readings weekly. Euvolemic on lower dose diuretics, adjusting diuretics prn.   Plan:  Continue PA pressure monitoring weekly, continued patient assessment and communication per abbot and Epic messaging, phone or clinic visit to maintain optimal range of PA pressures  AMANDA ALCARAZ NP,   25

## 2025-07-28 ENCOUNTER — NURSE ONLY (OUTPATIENT)
Dept: CARDIOLOGY CLINIC | Facility: HOSPITAL | Age: 75
End: 2025-07-28
Attending: NURSE PRACTITIONER
Payer: MEDICARE

## 2025-07-28 DIAGNOSIS — I50.32 CHRONIC HEART FAILURE WITH PRESERVED EJECTION FRACTION (HFPEF) (HCC): Primary | ICD-10-CM

## 2025-07-28 PROCEDURE — 93264 REM MNTR WRLS P-ART PRS SNR: CPT | Performed by: NURSE PRACTITIONER

## 2025-08-27 ENCOUNTER — NURSE ONLY (OUTPATIENT)
Dept: CARDIOLOGY CLINIC | Facility: HOSPITAL | Age: 75
End: 2025-08-27
Attending: NURSE PRACTITIONER

## 2025-08-27 DIAGNOSIS — I50.32 CHRONIC HEART FAILURE WITH PRESERVED EJECTION FRACTION (HFPEF) (HCC): Primary | ICD-10-CM

## 2025-08-27 PROCEDURE — 93264 REM MNTR WRLS P-ART PRS SNR: CPT | Performed by: NURSE PRACTITIONER

## (undated) DIAGNOSIS — Z98.61 S/P PTCA (PERCUTANEOUS TRANSLUMINAL CORONARY ANGIOPLASTY): Primary | ICD-10-CM

## (undated) DEVICE — STERILE LATEX POWDER-FREE SURGICAL GLOVESWITH NITRILE COATING: Brand: PROTEXIS

## (undated) DEVICE — KIT ENDO ORCAPOD 160/180/190

## (undated) DEVICE — KIT CLEAN ENDOKIT 1.1OZ GOWNX2

## (undated) DEVICE — V2 SPECIMEN COLLECTION MANIFOLD KIT: Brand: NEPTUNE

## (undated) DEVICE — 35 ML SYRINGE REGULAR TIP: Brand: MONOJECT

## (undated) DEVICE — KIT MFLD FOR SPEC COLL

## (undated) DEVICE — Device

## (undated) DEVICE — MEDI-VAC NON-CONDUCTIVE SUCTION TUBING 6MM X 1.8M (6FT.) L: Brand: CARDINAL HEALTH

## (undated) DEVICE — LINE MNTR ADLT SET O2 INTMD

## (undated) DEVICE — Device: Brand: CUSTOM PROCEDURE KIT

## (undated) DEVICE — SYRINGE, LUER SLIP, STERILE, 60ML: Brand: MEDLINE

## (undated) DEVICE — Device: Brand: DEFENDO AIR/WATER/SUCTION AND BIOPSY VALVE

## (undated) DEVICE — 60 ML SYRINGE REGULAR TIP: Brand: MONOJECT

## (undated) DEVICE — TRAP MCS 40ML 5IN PLS SCR CAP

## (undated) DEVICE — CO2 CANNULA,SSOFT,ADLT,7O2,4CO2,FEMALE: Brand: MEDLINE

## (undated) NOTE — LETTER
No referring provider defined for this encounter. 07/08/23        Patient: Fernando Mera   YOB: 1950   Date of Visit: 7/7/2023       Dear  Dr. Jose Daniel Ricardo MD,      Thank you for referring Fernando Mera to my practice. Please find my assessment and plan below. As you know she is a 81-year-old female with a history of adult onset diabetes mellitus, hypertension, coronary artery disease who I now had the pleasure of seeing for follow-up of chronic kidney disease stage III. She has not been compliant with follow-up and was last seen in May 2022. Overall though she states she has been doing okay without any chest pain, shortness of breath, GI or urinary tract symptoms. Recent laboratory studies were notable for the fact that on April 26, 2023 her creatinine actually was better at 1.09 with an estimated GFR of 59 cc/min. However follow-up labs done on June 12, 2023 were notable for creatinine of 1.91 with an estimated GFR 30 cc/min. She and the daughter states that after those labs Aldactone was discontinued. Lisinopril was decreased from 40 mg down to 20 mg daily and furosemide was decreased to 40 mg 3 times per week. Patient does have some chronic lower extremity edema. She thinks it may have gotten a bit worse since diuretics were decreased. However she denies any shortness of breath or chest pain. She has been checking her blood pressures at home and systolics usually in the 920J with diastolics in the 88A. On physical exam her blood pressure is 106/64 with a pulse of 53 and she weighed 242 pounds. Her neck was supple without JVD. Lungs were clear. Heart revealed a regular rate and rhythm with an S4 but no gallops, murmurs or rubs. Abdomen was soft, flat, nontender without organomegaly, masses or bruits. Extremities reveal 1+ edema bilaterally. I therefore informed the patient and her daughter that she did develop acute renal failure.   This may be secondary to diuretics and lisinopril. She does have lower extremity edema but has not had significant proteinuria. She just had a renal ultrasound done on June 26, 2023 that showed multiple small stones in the left kidney but otherwise was unremarkable. No prior history of renal calculi. She also just had an echocardiogram done yesterday which mentions the possibility of diastolic dysfunction but otherwise was unremarkable. She states she will be doing follow-up laboratory studies next week. They will have the lab copy me on these results. Suspect there will be improvement in renal function. Reinforced importance of following a low-salt diet. Maintain adequate hydration. Avoid nonsteroidals. Further impressions and recommendations will be forthcoming after reviewing the above. Thank you very much for allowing me to participate in the care of your patient. If you have any questions please feel free to call.         Sincerely,   Stella Guallpa MD   [facility], [address] [place] [address]  [address] [facility] [address]  [address] [address] [address]    Document electronically generated by:  Stella Guallpa MD

## (undated) NOTE — LETTER
[facility]  [address], [place], [place]    Authorization for Surgical Operation and Procedure                               I hereby authorize Vita Adams MD, my physician and his/her assistants (if applicable), which may include medical students, residents, and/or fellows, to perform the following surgical operation/ procedure and administer such anesthesia as may be determined necessary by my physician: Operation/Procedure name (s) COLONOSCOPY on Rosita Lemus   2.   I recognize that during the surgical operation/procedure, unforeseen conditions may necessitate additional or different procedures than those listed above.  I, therefore, further authorize and request that the above-named surgeon, assistants, or designees perform such procedures as are, in their judgment, necessary and desirable.    3.   My surgeon/physician has discussed prior to my surgery the potential benefits, risks and side effects of this procedure; the likelihood of achieving goals; and potential problems that might occur during recuperation.  They also discussed reasonable alternatives to the procedure, including risks, benefits, and side effects related to the alternatives and risks related to not receiving this procedure.  I have had all my questions answered and I acknowledge that no guarantee has been made as to the result that may be obtained.    4.   Should the need arise during my operation/procedure, which includes change of level of care prior to discharge, I also consent to the administration of blood and/or blood products.  Further, I understand that despite careful testing and screening of blood or blood products by collecting agencies, I may still be subject to ill effects as a result of receiving a blood transfusion and/or blood products.  The following are some, but not all, of the potential risks that can occur: fever and allergic reactions, hemolytic reactions, transmission of diseases such as  Hepatitis, AIDS and Cytomegalovirus (CMV) and fluid overload.  In the event that I wish to have an autologous transfusion of my own blood, or a directed donor transfusion, I will discuss this with my physician.  Check only if Refusing Blood or Blood Products  I understand refusal of blood or blood products as deemed necessary by my physician may have serious consequences to my condition to include possible death. I hereby assume responsibility for my refusal and release the hospital, its personnel, and my physicians from any responsibility for the consequences of my refusal.    o  Refuse   5.   I authorize the use of any specimen, organs, tissues, body parts or foreign objects that may be removed from my body during the operation/procedure for diagnosis, research or teaching purposes and their subsequent disposal by hospital authorities.  I also authorize the release of specimen test results and/or written reports to my treating physician on the hospital medical staff or other referring or consulting physicians involved in my care, at the discretion of the Pathologist or my treating physician.    6.   I consent to the photographing or videotaping of the operations or procedures to be performed, including appropriate portions of my body for medical, scientific, or educational purposes, provided my identity is not revealed by the pictures or by descriptive texts accompanying them.  If the procedure has been photographed/videotaped, the surgeon will obtain the original picture, image, videotape or CD.  The hospital will not be responsible for storage, release or maintenance of the picture, image, tape or CD.    7.   I consent to the presence of a  or observers in the operating room as deemed necessary by my physician or their designees.    8.   I recognize that in the event my procedure results in extended X-Ray/fluoroscopy time, I may develop a skin reaction.    9. If I have a Do Not Attempt  Resuscitation (DNAR) order in place, that status will be suspended while in the operating room, procedural suite, and during the recovery period unless otherwise explicitly stated by me (or a person authorized to consent on my behalf). The surgeon or my attending physician will determine when the applicable recovery period ends for purposes of reinstating the DNAR order.  10. Patients having a sterilization procedure: I understand that if the procedure is successful the results will be permanent and it will therefore be impossible for me to inseminate, conceive, or bear children.  I also understand that the procedure is intended to result in sterility, although the result has not been guaranteed.   11. I acknowledge that my physician has explained sedation/analgesia administration to me including the risk and benefits I consent to the administration of sedation/analgesia as may be necessary or desirable in the judgment of my physician.    I CERTIFY THAT I HAVE READ AND FULLY UNDERSTAND THE ABOVE CONSENT TO OPERATION and/or OTHER PROCEDURE.     ____________________________________  _________________________________        ______________________________  Signature of Patient    Signature of Responsible Person                Printed Name of Responsible Person                                      ____________________________________  _____________________________                ________________________________  Signature of Witness        Date  Time         Relationship to Patient    STATEMENT OF PHYSICIAN My signature below affirms that prior to the time of the procedure; I have explained to the patient and/or his/her legal representative, the risks and benefits involved in the proposed treatment and any reasonable alternative to the proposed treatment. I have also explained the risks and benefits involved in refusal of the proposed treatment and alternatives to the proposed treatment and have answered the patient's  questions. If I have a significant financial interest in a co-management agreement or a significant financial interest in any product or implant, or other significant relationship used in this procedure/surgery, I have disclosed this and had a discussion with my patient.     _____________________________________________________              _____________________________  (Signature of Physician)                                                                                         (Date)                                   (Time)  Patient Name: Rosita Lemus      : 1950      Printed: 2024     Medical Record #: [id number]                                      Page 1 of 1

## (undated) NOTE — LETTER
[facility]  [address], [place], [place]    Authorization for Surgical Operation and Procedure                               I hereby authorize Ye Ling MD, my physician and his/her assistants (if applicable), which may include medical students, residents, and/or fellows, to perform the following surgical operation/ procedure and administer such anesthesia as may be determined necessary by my physician: Operation/Procedure name (s) COLONOSCOPY on Rositaebenezer Lemus   2.   I recognize that during the surgical operation/procedure, unforeseen conditions may necessitate additional or different procedures than those listed above.  I, therefore, further authorize and request that the above-named surgeon, assistants, or designees perform such procedures as are, in their judgment, necessary and desirable.    3.   My surgeon/physician has discussed prior to my surgery the potential benefits, risks and side effects of this procedure; the likelihood of achieving goals; and potential problems that might occur during recuperation.  They also discussed reasonable alternatives to the procedure, including risks, benefits, and side effects related to the alternatives and risks related to not receiving this procedure.  I have had all my questions answered and I acknowledge that no guarantee has been made as to the result that may be obtained.    4.   Should the need arise during my operation/procedure, which includes change of level of care prior to discharge, I also consent to the administration of blood and/or blood products.  Further, I understand that despite careful testing and screening of blood or blood products by collecting agencies, I may still be subject to ill effects as a result of receiving a blood transfusion and/or blood products.  The following are some, but not all, of the potential risks that can occur: fever and allergic reactions, hemolytic reactions, transmission of diseases  such as Hepatitis, AIDS and Cytomegalovirus (CMV) and fluid overload.  In the event that I wish to have an autologous transfusion of my own blood, or a directed donor transfusion, I will discuss this with my physician.  Check only if Refusing Blood or Blood Products  I understand refusal of blood or blood products as deemed necessary by my physician may have serious consequences to my condition to include possible death. I hereby assume responsibility for my refusal and release the hospital, its personnel, and my physicians from any responsibility for the consequences of my refusal.    o  Refuse   5.   I authorize the use of any specimen, organs, tissues, body parts or foreign objects that may be removed from my body during the operation/procedure for diagnosis, research or teaching purposes and their subsequent disposal by hospital authorities.  I also authorize the release of specimen test results and/or written reports to my treating physician on the hospital medical staff or other referring or consulting physicians involved in my care, at the discretion of the Pathologist or my treating physician.    6.   I consent to the photographing or videotaping of the operations or procedures to be performed, including appropriate portions of my body for medical, scientific, or educational purposes, provided my identity is not revealed by the pictures or by descriptive texts accompanying them.  If the procedure has been photographed/videotaped, the surgeon will obtain the original picture, image, videotape or CD.  The hospital will not be responsible for storage, release or maintenance of the picture, image, tape or CD.    7.   I consent to the presence of a  or observers in the operating room as deemed necessary by my physician or their designees.    8.   I recognize that in the event my procedure results in extended X-Ray/fluoroscopy time, I may develop a skin reaction.    9. If I have a Do Not Attempt  Resuscitation (DNAR) order in place, that status will be suspended while in the operating room, procedural suite, and during the recovery period unless otherwise explicitly stated by me (or a person authorized to consent on my behalf). The surgeon or my attending physician will determine when the applicable recovery period ends for purposes of reinstating the DNAR order.  10. Patients having a sterilization procedure: I understand that if the procedure is successful the results will be permanent and it will therefore be impossible for me to inseminate, conceive, or bear children.  I also understand that the procedure is intended to result in sterility, although the result has not been guaranteed.   11. I acknowledge that my physician has explained sedation/analgesia administration to me including the risk and benefits I consent to the administration of sedation/analgesia as may be necessary or desirable in the judgment of my physician.    I CERTIFY THAT I HAVE READ AND FULLY UNDERSTAND THE ABOVE CONSENT TO OPERATION and/or OTHER PROCEDURE.     ____________________________________  _________________________________        ______________________________  Signature of Patient    Signature of Responsible Person                Printed Name of Responsible Person                                      ____________________________________  _____________________________                ________________________________  Signature of Witness        Date  Time         Relationship to Patient    STATEMENT OF PHYSICIAN My signature below affirms that prior to the time of the procedure; I have explained to the patient and/or his/her legal representative, the risks and benefits involved in the proposed treatment and any reasonable alternative to the proposed treatment. I have also explained the risks and benefits involved in refusal of the proposed treatment and alternatives to the proposed treatment and have answered the patient's  questions. If I have a significant financial interest in a co-management agreement or a significant financial interest in any product or implant, or other significant relationship used in this procedure/surgery, I have disclosed this and had a discussion with my patient.     _____________________________________________________              _____________________________  (Signature of Physician)                                                                                         (Date)                                   (Time)  Patient Name: Rosita Lemus      : 1950      Printed: 3/4/2025     Medical Record #: [id number]                                      Page 1 of 1

## (undated) NOTE — LETTER
No referring provider defined for this encounter. 05/10/22        Patient: Maile Chang   YOB: 1950   Date of Visit: 5/10/2022       Dear  Dr. Stephanie Streeter MD,      Thank you for referring Maile Chang to my practice. Please find my assessment and plan below. As you know she is a 68-year-old -American female with a history of adult onset diabetes mellitus, hypertension, coronary artery disease who I now had the pleasure of seeing for follow-up of chronic kidney disease stage III. The patient underwent a recent renal evaluation. Of note is that a sed rate, connective tissue profile, and random urine for Bence-Sotelo protein was nonrevealing. Her urine microalbumin to creatinine ratio was only mildly elevated at 217 and a renal ultrasound showed a simple right renal cyst but otherwise was unremarkable. I therefore informed the patient and her daughter that she does have chronic kidney disease stage III. This is most likely secondary to hypertension and/or diabetic nephropathy. Repeat creatinine done on April 20, 2022 was a bit higher at 1.55 with an estimated GFR 39 cc/min. Electrolytes were good and hemoglobin 11.5. I therefore reinforced importance of maintaining adequate hydration. Avoid any use of nonsteroidals. Keep blood pressure and blood sugars under good control. She has just recently started cardiac rehab. Will have her repeat a CBC and renal panel in 1 month to ensure stability. If stable will continue quarterly. I will see her again in 6 months for follow-up or sooner if clinically indicated. Thank you again for allowing me to participate in the care of your patient. If you have any questions please feel free to call.            Sincerely,   Caden Strickland MD   [address]  [address] [facility] [address]  [address] [address] [address]    Document electronically generated by:  Caden Strickland MD

## (undated) NOTE — LETTER
No referring provider defined for this encounter.       01/07/25        Patient: Rosita Lemus   YOB: 1950   Date of Visit: 1/7/2025       Dear  Dr. Tomasz MD,      Thank you for referring Rosita Lemus to my practice.  Please find my assessment and plan below.      As you know she is a 74-year-old female with a history of adult onset diabetes mellitus, hypertension, coronary artery disease, congestive heart failure secondary to diastolic dysfunction, status post CardioMEMS who I now had the pleasure of seeing for follow-up of chronic kidney disease stage III.  Patient states that she rarely gets called about her CardioMEMS reading.  If anything they often tend to be on the lower side.  She currently is on furosemide 20 mg 3 times per week.  She also was recently started on CPAP a couple of months ago for sleep apnea.  She is still in the process of getting adjusted to the mass but does admit that she has more energy when she wears it most of the night.  Still has some chronic lower extremity edema but otherwise no chest pain, shortness shortness of breath, GI or urinary tract symptoms.    Physical exam her blood pressure 124/62 with a pulse 64 she weighed 245 pounds.  Her neck was supple without JVD.  Lungs were clear.  Heart revealed a regular rate and rhythm and S4 but no gallops, murmurs or rubs.  Abdomen soft, flat, nontender without organomegaly, masses or bruits.  Extremities reveal 1+ lower extremity edema bilaterally.    Reviewed recent laboratory studies done on August 28, 2024.  Creatinine was normal at 0.81.  Repeat creatinine on December 17, 2024 was 0.90 with an estimated GFR of 68 cc/min.    I therefore reassured the patient and her daughter that overall her renal function has returned back to normal.  She is doing well from a cardiac standpoint and if anything her CardioMEMS readings are on the lower side.  As result diuretics have been decreased and renal function is  normalized.  I told her if the heart failure nurses can send me copies of her laboratory studies that I would review but if her renal remains normal I would not need to see her for follow-up unless you felt it was clinically indicated.  She knows to follow low-salt diet carefully.  Avoid nonsteroidals.  If lower extremity edema continues to be a problem may benefit from being seen by the lymphedema clinic.    Thank you again for allowing me to participate in the care of your patient.  If you have any questions please feel free to call.               Sincerely,   Bryan Whitehead MD   [facility]  [address]  [facility] [id number]    Document electronically generated by:  Bryan Whitehead MD

## (undated) NOTE — LETTER
[address] [address]      1/4/2023    Dear Robinette Nissen,    It has come to our attention that you are due for: CT Chest in February. This test was ordered by Dr. Edward Estes. Please call for an appointment at [phone]. If you are allergic to iodine or have any questions, please call the office at [phone].        Thank you,         [facility] Clinical Staff

## (undated) NOTE — LETTER
No referring provider defined for this encounter. 04/05/22        Patient: Unknown Sickle   YOB: 1950   Date of Visit: 4/5/2022       Dear  Dr. Yaquelin Godinez MD,      Thank you for referring Unknown Sickle to my practice. Please find my assessment and plan below. As you know she is a 82-year-old -American female with a history of adult onset diabetes mellitus, hypertension, coronary artery disease who I now had the pleasure of seeing for evaluation of chronic kidney disease stage III. Laboratory studies were reviewed in epic. She had a creatinine 0.70 back in June 2020. In October 2021 her creatinine was 1.17 and on December 23, 2021 her creatinine is 1.23. Finally on March 6, 2022 her creatinine is 1.42 with an estimated GFR of 43 cc/min and renal consultation has now been called. Of note she did receive a CT scan with contrast on January 18, 2022. Her past medical history is significant for hypertension at least 10 to 15 years. She has had some trauma to the eye but is not aware of any actual diabetic retinopathy or peripheral neuropathy. She has been hypertensive for at least 30 years. She had 2 coronary artery stents placed in January 2022. Medications are as listed. Denies any significant use of nonsteroidals. Social history she is a non-smoker. Family history is positive for hypertension diabetes but negative for renal pathology. Review of system patient otherwise states she is doing well without any chest pain, shortness of breath, GI or urinary tract symptoms. 10 point review of systems is otherwise unremarkable. On physical exam her blood pressure was 101/54 with a pulse of 64 and she weighed 247 pounds. She states her blood pressure readings at home are usually more in the 880 systolic range. Her neck was supple without JVD. Lungs were clear. Heart revealed a regular rate and rhythm with an S4 but no gallops, murmurs or rubs.   Abdomen was soft, flat, nontender without organomegaly, masses or bruits. Extremities revealed no edema. Laboratory studies as stated above. Urine for microalbumin was only mildly elevated at 71 and her A1c was 7.9. I therefore informed the patient and her daughter that she has chronic kidney disease stage III. This most likely secondary to hypertension and/or diabetic nephropathy. Other causes need to be excluded. For completion sake a repeat CBC, renal panel, sed rate, connective tissue profile, random urine for Bence-Sotelo protein and a renal ultrasound have been ordered. Further impressions and recommendations will be forthcoming after reviewing the above. Reinforced importance of maintaining adequate hydration. Avoid nonsteroidals. She knows that she is at risk for contrast-induced nephropathy. Thank you very much for allowing me to participate in the care of your patient. If you have any questions please feel free to call.            Sincerely,   Hollie John MD   [facility] , [address] [place] [address]  [address] [facility] [address]  [address] [address] [address]    Document electronically generated by:  Hollie John MD

## (undated) NOTE — LETTER
[address]  Authorization for Surgical Operation and Procedure                                                                                           1. I hereby authorize Abhijeet Ordoñez MD, my physician and his/her assistants (if applicable), which may include medical students, residents, and/or fellows, to perform the following surgical operation/ procedure and administer such anesthesia as may be determined necessary by my physician: Operation/Procedure name (s) COLONOSCOPY-SCREENING on Paul Dorsey   2. I recognize that during the surgical operation/procedure, unforeseen conditions may necessitate additional or different procedures than those listed above. I, therefore, further authorize and request that the above-named surgeon, assistants, or designees perform such procedures as are, in their judgment, necessary and desirable. 3.   My surgeon/physician has discussed prior to my surgery the potential benefits, risks and side effects of this procedure; the likelihood of achieving goals; and potential problems that might occur during recuperation. They also discussed reasonable alternatives to the procedure, including risks, benefits, and side effects related to the alternatives and risks related to not receiving this procedure. I have had all my questions answered and I acknowledge that no guarantee has been made as to the result that may be obtained. 4.   Should the need arise during my operation/procedure, which includes change of level of care prior to discharge, I also consent to the administration of blood and/or blood products. Further, I understand that despite careful testing and screening of blood or blood products by collecting agencies, I may still be subject to ill effects as a result of receiving a blood transfusion and/or blood products.   The following are some, but not all, of the potential risks that can occur: fever and allergic reactions, hemolytic reactions, transmission of diseases such as Hepatitis, AIDS and Cytomegalovirus (CMV) and fluid overload. In the event that I wish to have an autologous transfusion of my own blood, or a directed donor transfusion, I will discuss this with my physician. Check only if Refusing Blood or Blood Products  I understand refusal of blood or blood products as deemed necessary by my physician may have serious consequences to my condition to include possible death. I hereby assume responsibility for my refusal and release the hospital, its personnel, and my physicians from any responsibility for the consequences of my refusal.    o  Refuse   5. I authorize the use of any specimen, organs, tissues, body parts or foreign objects that may be removed from my body during the operation/procedure for diagnosis, research or teaching purposes and their subsequent disposal by hospital authorities. I also authorize the release of specimen test results and/or written reports to my treating physician on the hospital medical staff or other referring or consulting physicians involved in my care, at the discretion of the Pathologist or my treating physician. 6.   I consent to the photographing or videotaping of the operations or procedures to be performed, including appropriate portions of my body for medical, scientific, or educational purposes, provided my identity is not revealed by the pictures or by descriptive texts accompanying them. If the procedure has been photographed/videotaped, the surgeon will obtain the original picture, image, videotape or CD. The hospital will not be responsible for storage, release or maintenance of the picture, image, tape or CD.    7.   I consent to the presence of a  or observers in the operating room as deemed necessary by my physician or their designees.     8.   I recognize that in the event my procedure results in extended X-Ray/fluoroscopy time, I may develop a skin reaction. 9. If I have a Do Not Attempt Resuscitation (DNAR) order in place, that status will be suspended while in the operating room, procedural suite, and during the recovery period unless otherwise explicitly stated by me (or a person authorized to consent on my behalf). The surgeon or my attending physician will determine when the applicable recovery period ends for purposes of reinstating the DNAR order. 10. Patients having a sterilization procedure: I understand that if the procedure is successful the results will be permanent and it will therefore be impossible for me to inseminate, conceive, or bear children. I also understand that the procedure is intended to result in sterility, although the result has not been guaranteed. 11. I acknowledge that my physician has explained sedation/analgesia administration to me including the risk and benefits I consent to the administration of sedation/analgesia as may be necessary or desirable in the judgment of my physician. I CERTIFY THAT I HAVE READ AND FULLY UNDERSTAND THE ABOVE CONSENT TO OPERATION and/or OTHER PROCEDURE.     _________________________________________ _________________________________     ___________________________________  Signature of Patient     Signature of Responsible Person                   Printed Name of Responsible Person                              _________________________________________ ______________________________        ___________________________________  Signature of Witness         Date  Time         Relationship to Patient    STATEMENT OF PHYSICIAN My signature below affirms that prior to the time of the procedure; I have explained to the patient and/or his/her legal representative, the risks and benefits involved in the proposed treatment and any reasonable alternative to the proposed treatment.  I have also explained the risks and benefits involved in refusal of the proposed treatment and alternatives to the proposed treatment and have answered the patient's questions.  If I have a significant financial interest in a co-management agreement or a significant financial interest in any product or implant, or other significant relationship used in this procedure/surgery, I have disclosed this and had a discussion with my patient.     _______________________________________________________________ _____________________________  Bijal Thakkar)                                                                                         (Date)                                   (Time)  Patient Name: Alie Farias    : 1950   Printed: 2022      Medical Record #: [id number]                                              Page 1 of 1

## (undated) NOTE — LETTER
[facility]  Administration of Anesthesia  IRosita agree to be cared for by a physician anesthesiologist alone and/or with a nurse anesthetist, who is specially trained to monitor me and give me medicine to put me to sleep or keep me comfortable during my procedure    I understand that my anesthesiologist and/or anesthetist is not an employee or agent of [facility] or [employer]. He or she works for [facility]    As the patient asking for anesthesia services, I agree to:  Allow the anesthesiologist (anesthesia doctor) to give me medicine and do additional procedures as necessary. Some examples are: Starting or using an “IV” to give me medicine, fluids or blood during my procedure, and having a breathing tube placed to help me breathe when I’m asleep (intubation). In the event that my heart stops working properly, I understand that my anesthesiologist will make every effort to sustain my life, unless otherwise directed by [facility] Do Not Resuscitate documents.  Tell my anesthesia doctor before my procedure:  If I am pregnant.  The last time that I ate or drank.  iii. All of the medicines I take (including prescriptions, herbal supplements, and pills I can buy without a prescription (including street drugs/illegal medications). Failure to inform my anesthesiologist about these medicines may increase my risk of anesthetic complications.  iv.If I am allergic to anything or have had a reaction to anesthesia before.  I understand how the anesthesia medicine will help me (benefits).  I understand that with any type of anesthesia medicine there are risks:  The most common risks are: nausea, vomiting, sore throat, muscle soreness, damage to my eyes, mouth, or teeth (from breathing tube placement).  Rare risks include: remembering what happened during my procedure, allergic reactions to medications, injury to my airway, heart, lungs, vision, nerves, or  muscles and in extremely rare instances death.  My doctor has explained to me other choices available to me for my care (alternatives).  Pregnant Patients (“epidural”):  I understand that the risks of having an epidural (medicine given into my back to help control pain during labor), include itching, low blood pressure, difficulty urinating, headache or slowing of the baby’s heart. Very rare risks include infection, bleeding, seizure, irregular heart rhythms and nerve injury.  Regional Anesthesia (“spinal”, “epidural”, & “nerve blocks”):  I understand that rare but potential complications include headache, bleeding, infection, seizure, irregular heart rhythms, and nerve injury.    _____________________________________________________________________________  Patient (or Representative) Signature/Relationship to Patient  Date   Time    _____________________________________________________________________________   Name (if used)    Language/Organization   Time    _____________________________________________________________________________  Nurse Anesthetist Signature     Date   Time  _____________________________________________________________________________  Anesthesiologist Signature     Date   Time  I have discussed the procedure and information above with the patient (or patient’s representative) and answered their questions. The patient or their representative has agreed to have anesthesia services.    _____________________________________________________________________________  Witness        Date   Time  I have verified that the signature is that of the patient or patient’s representative, and that it was signed before the procedure  Patient Name: Rosita Lemus     : 1950                 Printed: 2024 at 10:47 AM    Medical Record #: [id number]                                            Page 1 of 1  ----------ANESTHESIA CONSENT----------

## (undated) NOTE — LETTER
[facility]  Administration of Anesthesia  1. I, Casey Streeter, or _________________________________ acting on her behalf, (Patient) (Dependent/Representative) request to receive anesthesia for my pending procedure/operation/treatment. infections, high spinal block, spinal bleeding, seizure, cardiac arrest and death. 7. AWARENESS: I understand that it is possible (but unlikely) to have explicit memory of events from the operating room while under general anesthesia.   8. ELECTROCONVULSIV unconscious pt /Relationship    My signature below affirms that prior to the time of the procedure, I have explained to the patient and/or his/her guardian, the risks and benefits of undergoing anesthesia, as well as any reasonable alternatives.     _______

## (undated) NOTE — LETTER
[facility]  Administration of Anesthesia  IRosita agree to be cared for by a physician anesthesiologist alone and/or with a nurse anesthetist, who is specially trained to monitor me and give me medicine to put me to sleep or keep me comfortable during my procedure    I understand that my anesthesiologist and/or anesthetist is not an employee or agent of [facility] or [employer]. He or she works for [facility]    As the patient asking for anesthesia services, I agree to:  Allow the anesthesiologist (anesthesia doctor) to give me medicine and do additional procedures as necessary. Some examples are: Starting or using an “IV” to give me medicine, fluids or blood during my procedure, and having a breathing tube placed to help me breathe when I’m asleep (intubation). In the event that my heart stops working properly, I understand that my anesthesiologist will make every effort to sustain my life, unless otherwise directed by [facility] Do Not Resuscitate documents.  Tell my anesthesia doctor before my procedure:  If I am pregnant.  The last time that I ate or drank.  iii. All of the medicines I take (including prescriptions, herbal supplements, and pills I can buy without a prescription (including street drugs/illegal medications). Failure to inform my anesthesiologist about these medicines may increase my risk of anesthetic complications.  iv.If I am allergic to anything or have had a reaction to anesthesia before.  I understand how the anesthesia medicine will help me (benefits).  I understand that with any type of anesthesia medicine there are risks:  The most common risks are: nausea, vomiting, sore throat, muscle soreness, damage to my eyes, mouth, or teeth (from breathing tube placement).  Rare risks include: remembering what happened during my procedure, allergic reactions to medications, injury to my airway, heart, lungs, vision, nerves, or  muscles and in extremely rare instances death.  My doctor has explained to me other choices available to me for my care (alternatives).  Pregnant Patients (“epidural”):  I understand that the risks of having an epidural (medicine given into my back to help control pain during labor), include itching, low blood pressure, difficulty urinating, headache or slowing of the baby’s heart. Very rare risks include infection, bleeding, seizure, irregular heart rhythms and nerve injury.  Regional Anesthesia (“spinal”, “epidural”, & “nerve blocks”):  I understand that rare but potential complications include headache, bleeding, infection, seizure, irregular heart rhythms, and nerve injury.    _____________________________________________________________________________  Patient (or Representative) Signature/Relationship to Patient  Date   Time    _____________________________________________________________________________   Name (if used)    Language/Organization   Time    _____________________________________________________________________________  Nurse Anesthetist Signature     Date   Time  _____________________________________________________________________________  Anesthesiologist Signature     Date   Time  I have discussed the procedure and information above with the patient (or patient’s representative) and answered their questions. The patient or their representative has agreed to have anesthesia services.    _____________________________________________________________________________  Witness        Date   Time  I have verified that the signature is that of the patient or patient’s representative, and that it was signed before the procedure  Patient Name: Rosita Lemus     : 1950                 Printed: 3/4/2025 at 11:53 AM    Medical Record #: [id number]                                            Page 1 of 1  ----------ANESTHESIA CONSENT----------

## (undated) NOTE — LETTER
No referring provider defined for this encounter.       07/16/24        Patient: Rosita Lemus   YOB: 1950   Date of Visit: 7/16/2024       Dear  Dr. Tomasz MD,      Thank you for referring Rosita Lemus to my practice.  Please find my assessment and plan below.      As you know she is a 73-year-old female with a history of adult onset diabetes mellitus, hypertension, coronary artery disease, congestive heart failure who I now had the pleasure of seeing for follow-up of chronic kidney disease stage III.  I have not seen the patient since July 2023.  She is states that since I last saw her she had a CardioMEMS placed.  Last echocardiogram I could find was from July 2023 that showed a left ventricular ejection fraction of 55 to 60% with diastolic dysfunction.  She had a right heart cath done in December 2023 which was quite good with a right atrial pressure of 5 and a pulmonary capillary wedge pressure of 10.  She apparently has had ongoing problems with lower extremity edema but at least by history no significant pulmonary edema.  She states diuretics have been increased and then decreased and she is concerned about her kidney function.    At the present time she is taking furosemide 40 mg on Mondays and Fridays and 20 mg on Wednesdays.  She is on 40 mg of lisinopril.  She does wear support hose.    On physical exam her blood pressure is 108/48 with a pulse of 60 and she weighed 242 pounds.  Her neck was supple without JVD.  Lungs were clear.  Heart revealed a regular rate and rhythm with an S4 but no gallops, murmurs or rubs.  Abdomen was soft, flat, nontender without organomegaly, masses or bruits.  Extremities revealed 1-2+ lower extremity edema right greater than left.    I reviewed recent laboratory studies.  Creatinine was up to 1.43.  In April 2024 but has been declining.  Was 1.27 later on in April 2024 and on Fartun 3, 2024 creatinine is 1.22 with an estimated GFR of 47 cc/min.  She  had an A1c of 6.9 in May 2024 and a urine microalbumin to creatinine ratio of 293.  She had a renal ultrasound done in June 2023 that was unremarkable.    I therefore reassured the patient that all she does have chronic kidney disease stage III her blood pressures and blood sugars appear to be under good control.  Renal function fairly stable.  I think there are times when her CardioMEMS readings are low or normal but she still has peripheral edema.  Discussed that in that setting it would be inappropriate to increase diuretics as that would exacerbate her renal insufficiency.  Reinforced low-salt diet, elevation of the legs and to continue to wear support hose.  She does not have significant proteinuria to account for this edema.  May benefit from evaluation for varicose veins and/or lymphedema.  Avoid nonsteroidals.  She will have the heart failure clinic send me copies of her laboratory studies.  Return in 6 months or sooner if clinically indicated.    Thank you again for allowing me to participate in the care of your patient.  If you have any questions please feel free to call.        Sincerely,   Bryan Whitehead MD   [facility], [facility], [place]  [address]  [facility] [id number]    Document electronically generated by:  Bryan Whitehead MD